# Patient Record
Sex: MALE | Race: WHITE | NOT HISPANIC OR LATINO | Employment: OTHER | URBAN - METROPOLITAN AREA
[De-identification: names, ages, dates, MRNs, and addresses within clinical notes are randomized per-mention and may not be internally consistent; named-entity substitution may affect disease eponyms.]

---

## 2023-05-09 NOTE — PROGRESS NOTES
Subjective   Kun Castle Jr is a 77 y.o. male who presents for patient is here for follow-up.  HPI  Kun is a 77-year-old male with known history of hypertension dyslipidemia vitamin D deficiency rheumatoid arthritis patient is here for follow-up, with major complaint denies chest pain shortness of breath fever chills nausea vomiting constipation diarrhea dysuria urgency frequency.  Patient is fasting for blood work.  Review of Systems  10 system review pertinent as above  Objective     Visit Vitals  /80   Pulse 78   Temp 36.9 °C (98.4 °F)   Resp 16      Physical Exam  HEENT: Atraumatic normocephalic the pupils are equal and round and reactive to light the sclerae nonicteric extraocular motion are intact.  Neck: Is supple without JVD no carotid bruits the trachea is midline there are no masses pulses are equal and bilateral with normal upstroke.  Skin: Normal.  Skin good texture.  Moist.  Good turgor.  No lesions, no rashes.  Lymph: No lymphadenopathy appreciated, no masses, no lesions  Lungs: Are clear to auscultation and percussion, good breath sounds bilaterally, no rhonchi, no wheezing, good diaphragmatic excursion.  Heart: Normal rate and normal rhythm S1, S2, no S3, no gallop, murmur or rub.  Abdomen: Soft, nontender, no organomegaly, good bowel sounds.    Extremities: Full range of motion, good pulses bilateral.  No cyanosis, no clubbing or edema.  Neuro: Cranial nerves II-XII are grossly intact there is no sensory or motor deficits.  Able to move all extremities.      Assessment/Plan     Here for follow-up fasting blood work    CBC BMP lipids AST ALT vitamin D 25-hydroxy      Continue with the low-fat, low-cholesterol diet,  I recommended Mediterranean diet, which include fish, chicken, vegetables and olive oil  Exercise daily for 30 minutes at least 3 times a week  Continue home medications    Hypertension  No added salt diet, do not and salt to your food  Try to exercise every other day for 30  minutes  Continue current medications    Rheumatoid arthritis  With deformed joint integrity  Occasional pain        Problem List Items Addressed This Visit          Circulatory    Hypertension - Primary       Digestive    Diverticulosis of large intestine without hemorrhage       Genitourinary    Prostate hypertrophy       Endocrine/Metabolic    Vitamin D deficiency       Other    Rheumatoid arthritis (CMS/HCC)    Hyperlipidemia         Hayden Bro MD

## 2023-05-11 ENCOUNTER — OFFICE VISIT (OUTPATIENT)
Dept: PRIMARY CARE | Facility: CLINIC | Age: 78
End: 2023-05-11
Payer: MEDICARE

## 2023-05-11 VITALS
TEMPERATURE: 98.4 F | BODY MASS INDEX: 37.69 KG/M2 | WEIGHT: 192 LBS | RESPIRATION RATE: 16 BRPM | HEART RATE: 78 BPM | SYSTOLIC BLOOD PRESSURE: 118 MMHG | HEIGHT: 60 IN | DIASTOLIC BLOOD PRESSURE: 80 MMHG

## 2023-05-11 DIAGNOSIS — M06.9 RHEUMATOID ARTHRITIS, INVOLVING UNSPECIFIED SITE, UNSPECIFIED WHETHER RHEUMATOID FACTOR PRESENT (MULTI): ICD-10-CM

## 2023-05-11 DIAGNOSIS — E55.9 VITAMIN D DEFICIENCY: ICD-10-CM

## 2023-05-11 DIAGNOSIS — K57.30 DIVERTICULOSIS OF LARGE INTESTINE WITHOUT HEMORRHAGE: ICD-10-CM

## 2023-05-11 DIAGNOSIS — N40.0 PROSTATE HYPERTROPHY: ICD-10-CM

## 2023-05-11 DIAGNOSIS — I10 HYPERTENSION, UNSPECIFIED TYPE: Primary | ICD-10-CM

## 2023-05-11 DIAGNOSIS — E78.2 MODERATE MIXED HYPERLIPIDEMIA NOT REQUIRING STATIN THERAPY: ICD-10-CM

## 2023-05-11 PROBLEM — E78.5 HYPERLIPIDEMIA: Status: ACTIVE | Noted: 2023-05-11

## 2023-05-11 PROCEDURE — 80061 LIPID PANEL: CPT | Performed by: INTERNAL MEDICINE

## 2023-05-11 PROCEDURE — 82306 VITAMIN D 25 HYDROXY: CPT | Performed by: INTERNAL MEDICINE

## 2023-05-11 PROCEDURE — 84450 TRANSFERASE (AST) (SGOT): CPT | Performed by: INTERNAL MEDICINE

## 2023-05-11 PROCEDURE — 1036F TOBACCO NON-USER: CPT | Performed by: INTERNAL MEDICINE

## 2023-05-11 PROCEDURE — 85025 COMPLETE CBC W/AUTO DIFF WBC: CPT | Performed by: INTERNAL MEDICINE

## 2023-05-11 PROCEDURE — 3079F DIAST BP 80-89 MM HG: CPT | Performed by: INTERNAL MEDICINE

## 2023-05-11 PROCEDURE — 84460 ALANINE AMINO (ALT) (SGPT): CPT | Performed by: INTERNAL MEDICINE

## 2023-05-11 PROCEDURE — 3074F SYST BP LT 130 MM HG: CPT | Performed by: INTERNAL MEDICINE

## 2023-05-11 PROCEDURE — 80048 BASIC METABOLIC PNL TOTAL CA: CPT | Performed by: INTERNAL MEDICINE

## 2023-05-11 PROCEDURE — 99214 OFFICE O/P EST MOD 30 MIN: CPT | Performed by: INTERNAL MEDICINE

## 2023-05-11 RX ORDER — LOSARTAN POTASSIUM 50 MG/1
1 TABLET ORAL DAILY
COMMUNITY
Start: 2018-04-12 | End: 2024-02-01

## 2023-05-11 RX ORDER — MULTIVITAMIN/IRON/FOLIC ACID 18MG-0.4MG
1 TABLET ORAL DAILY
COMMUNITY

## 2023-05-11 RX ORDER — CHOLECALCIFEROL (VITAMIN D3) 50 MCG
1 TABLET ORAL DAILY
COMMUNITY

## 2023-05-11 ASSESSMENT — PAIN SCALES - GENERAL: PAINLEVEL: 0-NO PAIN

## 2023-10-16 NOTE — PROGRESS NOTES
Subjective   Kun Castle Jr is a 78 y.o. male who presents for patient is here for follow-up.  HPI  Kun is a 77-year-old male with known history of hypertension dyslipidemia vitamin D deficiency rheumatoid arthritis patient is here for follow-up, with major complaint denies chest pain shortness of breath fever chills nausea vomiting constipation diarrhea dysuria urgency frequency.  Patient is fasting for blood work.  Review of Systems  10 system review pertinent as above  Objective     Visit Vitals  /74   Pulse 78   Temp 36.4 °C (97.5 °F)   Resp 15      Physical Exam  HEENT: Atraumatic normocephalic the pupils are equal and round and reactive to light the sclerae nonicteric extraocular motion are intact.  Neck: Is supple without JVD no carotid bruits the trachea is midline there are no masses pulses are equal and bilateral with normal upstroke.  Skin: Normal.  Skin good texture.  Moist.  Good turgor.  No lesions, no rashes.  Lymph: No lymphadenopathy appreciated, no masses, no lesions  Lungs: Are clear to auscultation and percussion, good breath sounds bilaterally, no rhonchi, no wheezing, good diaphragmatic excursion.  Heart: Normal rate and normal rhythm S1, S2, no S3, no gallop, murmur or rub.  Abdomen: Soft, nontender, no organomegaly, good bowel sounds.    Extremities: Full range of motion, good pulses bilateral.  No cyanosis, no clubbing or edema.  Neuro: Cranial nerves II-XII are grossly intact there is no sensory or motor deficits.  Able to move all extremities.      Assessment/Plan     Here for follow-up fasting blood work    CBC BMP lipids AST ALT vitamin D 25-hydroxy    Influenza HD 10/18/2023    Rt Knee pain osteoarthritis  Pain severe at time  Will refer to orthopedic surgery    Continue with the low-fat, low-cholesterol diet,  I recommended Mediterranean diet, which include fish, chicken, vegetables and olive oil  Exercise daily for 30 minutes at least 3 times a week  Continue home  medications    Hypertension  No added salt diet, do not and salt to your food  Try to exercise every other day for 30 minutes  Continue current medications    Rheumatoid arthritis  With deformed joint integrity  Occasional pain        Problem List Items Addressed This Visit       Rheumatoid arthritis (CMS/HCC)    Relevant Orders    CBC    Diverticulosis of large intestine without hemorrhage    Relevant Orders    CBC    Hyperlipidemia - Primary    Relevant Orders    Lipid Panel    AST    ALT    Hypertension    Relevant Orders    Basic Metabolic Panel    Prostate hypertrophy    Vitamin D deficiency     Other Visit Diagnoses       Primary osteoarthritis of right knee        Relevant Orders    Referral to Orthopaedic Surgery    XR knee right 1-2 views            Hayden Bro MD

## 2023-10-18 ENCOUNTER — OFFICE VISIT (OUTPATIENT)
Dept: PRIMARY CARE | Facility: CLINIC | Age: 78
End: 2023-10-18
Payer: MEDICARE

## 2023-10-18 VITALS
HEART RATE: 78 BPM | RESPIRATION RATE: 15 BRPM | DIASTOLIC BLOOD PRESSURE: 74 MMHG | TEMPERATURE: 97.5 F | SYSTOLIC BLOOD PRESSURE: 118 MMHG | HEIGHT: 68 IN | BODY MASS INDEX: 28.64 KG/M2 | WEIGHT: 189 LBS

## 2023-10-18 DIAGNOSIS — N40.0 PROSTATE HYPERTROPHY: ICD-10-CM

## 2023-10-18 DIAGNOSIS — M17.11 PRIMARY OSTEOARTHRITIS OF RIGHT KNEE: ICD-10-CM

## 2023-10-18 DIAGNOSIS — M06.9 RHEUMATOID ARTHRITIS, INVOLVING UNSPECIFIED SITE, UNSPECIFIED WHETHER RHEUMATOID FACTOR PRESENT (MULTI): ICD-10-CM

## 2023-10-18 DIAGNOSIS — E78.2 MODERATE MIXED HYPERLIPIDEMIA NOT REQUIRING STATIN THERAPY: Primary | ICD-10-CM

## 2023-10-18 DIAGNOSIS — K57.30 DIVERTICULOSIS OF LARGE INTESTINE WITHOUT HEMORRHAGE: ICD-10-CM

## 2023-10-18 DIAGNOSIS — E55.9 VITAMIN D DEFICIENCY: ICD-10-CM

## 2023-10-18 DIAGNOSIS — I10 PRIMARY HYPERTENSION: ICD-10-CM

## 2023-10-18 PROCEDURE — 99214 OFFICE O/P EST MOD 30 MIN: CPT | Performed by: INTERNAL MEDICINE

## 2023-10-18 PROCEDURE — 84450 TRANSFERASE (AST) (SGOT): CPT | Performed by: INTERNAL MEDICINE

## 2023-10-18 PROCEDURE — 1036F TOBACCO NON-USER: CPT | Performed by: INTERNAL MEDICINE

## 2023-10-18 PROCEDURE — 84460 ALANINE AMINO (ALT) (SGPT): CPT | Performed by: INTERNAL MEDICINE

## 2023-10-18 PROCEDURE — 85025 COMPLETE CBC W/AUTO DIFF WBC: CPT | Performed by: INTERNAL MEDICINE

## 2023-10-18 PROCEDURE — 3078F DIAST BP <80 MM HG: CPT | Performed by: INTERNAL MEDICINE

## 2023-10-18 PROCEDURE — 90662 IIV NO PRSV INCREASED AG IM: CPT | Performed by: INTERNAL MEDICINE

## 2023-10-18 PROCEDURE — 3074F SYST BP LT 130 MM HG: CPT | Performed by: INTERNAL MEDICINE

## 2023-10-18 PROCEDURE — 1125F AMNT PAIN NOTED PAIN PRSNT: CPT | Performed by: INTERNAL MEDICINE

## 2023-10-18 PROCEDURE — G0008 ADMIN INFLUENZA VIRUS VAC: HCPCS | Performed by: INTERNAL MEDICINE

## 2023-10-18 PROCEDURE — 80061 LIPID PANEL: CPT | Performed by: INTERNAL MEDICINE

## 2023-10-18 PROCEDURE — 1159F MED LIST DOCD IN RCRD: CPT | Performed by: INTERNAL MEDICINE

## 2023-10-18 PROCEDURE — 80048 BASIC METABOLIC PNL TOTAL CA: CPT | Performed by: INTERNAL MEDICINE

## 2023-10-18 ASSESSMENT — PAIN SCALES - GENERAL: PAINLEVEL: 4

## 2023-10-18 ASSESSMENT — ENCOUNTER SYMPTOMS
LOSS OF SENSATION IN FEET: 0
OCCASIONAL FEELINGS OF UNSTEADINESS: 0
DEPRESSION: 0

## 2023-10-25 ENCOUNTER — ANCILLARY PROCEDURE (OUTPATIENT)
Dept: RADIOLOGY | Facility: CLINIC | Age: 78
End: 2023-10-25
Payer: MEDICARE

## 2023-10-25 DIAGNOSIS — M17.11 PRIMARY OSTEOARTHRITIS OF RIGHT KNEE: ICD-10-CM

## 2023-10-25 PROCEDURE — 73562 X-RAY EXAM OF KNEE 3: CPT | Mod: RIGHT SIDE | Performed by: RADIOLOGY

## 2023-10-25 PROCEDURE — 73562 X-RAY EXAM OF KNEE 3: CPT | Mod: RT,FY

## 2023-11-04 PROBLEM — J45.909 ASTHMA DUE TO SEASONAL ALLERGIES (HHS-HCC): Status: ACTIVE | Noted: 2023-11-04

## 2023-11-04 PROBLEM — M66.232: Status: ACTIVE | Noted: 2023-11-04

## 2023-11-04 PROBLEM — M25.849 CYST OF JOINT OF HAND: Status: ACTIVE | Noted: 2023-11-04

## 2023-11-04 PROBLEM — M17.0 ARTHRITIS OF BOTH KNEES: Status: ACTIVE | Noted: 2023-11-04

## 2023-11-04 PROBLEM — M89.9 DISORDER OF BONE AND CARTILAGE: Status: ACTIVE | Noted: 2023-11-04

## 2023-11-04 PROBLEM — M65.931 EXTENSOR TENOSYNOVITIS OF RIGHT WRIST: Status: ACTIVE | Noted: 2023-11-04

## 2023-11-04 PROBLEM — M66.242: Status: ACTIVE | Noted: 2023-11-04

## 2023-11-04 PROBLEM — M19.90 ARTHRITIS: Status: ACTIVE | Noted: 2023-11-04

## 2023-11-04 PROBLEM — E29.1 HYPOGONADISM MALE: Status: ACTIVE | Noted: 2023-11-04

## 2023-11-04 PROBLEM — R06.02 SOB (SHORTNESS OF BREATH): Status: ACTIVE | Noted: 2023-11-04

## 2023-11-04 PROBLEM — M66.241: Status: ACTIVE | Noted: 2023-11-04

## 2023-11-04 PROBLEM — M65.831 EXTENSOR TENOSYNOVITIS OF RIGHT WRIST: Status: ACTIVE | Noted: 2023-11-04

## 2023-11-04 PROBLEM — M94.9 DISORDER OF BONE AND CARTILAGE: Status: ACTIVE | Noted: 2023-11-04

## 2023-11-04 PROBLEM — K40.90 RIGHT INGUINAL HERNIA: Status: ACTIVE | Noted: 2023-11-04

## 2023-11-04 RX ORDER — TRIAMCINOLONE ACETONIDE 1 MG/G
CREAM TOPICAL 2 TIMES DAILY
COMMUNITY
End: 2023-11-06

## 2023-11-06 ENCOUNTER — OFFICE VISIT (OUTPATIENT)
Dept: ORTHOPEDIC SURGERY | Facility: CLINIC | Age: 78
End: 2023-11-06
Payer: MEDICARE

## 2023-11-06 VITALS — BODY MASS INDEX: 29.19 KG/M2 | WEIGHT: 192 LBS

## 2023-11-06 DIAGNOSIS — M17.11 PRIMARY OSTEOARTHRITIS OF RIGHT KNEE: ICD-10-CM

## 2023-11-06 DIAGNOSIS — M17.11 ARTHRITIS OF RIGHT KNEE: Primary | ICD-10-CM

## 2023-11-06 PROCEDURE — 1125F AMNT PAIN NOTED PAIN PRSNT: CPT | Performed by: STUDENT IN AN ORGANIZED HEALTH CARE EDUCATION/TRAINING PROGRAM

## 2023-11-06 PROCEDURE — 1159F MED LIST DOCD IN RCRD: CPT | Performed by: STUDENT IN AN ORGANIZED HEALTH CARE EDUCATION/TRAINING PROGRAM

## 2023-11-06 PROCEDURE — 1036F TOBACCO NON-USER: CPT | Performed by: STUDENT IN AN ORGANIZED HEALTH CARE EDUCATION/TRAINING PROGRAM

## 2023-11-06 PROCEDURE — 99214 OFFICE O/P EST MOD 30 MIN: CPT | Performed by: STUDENT IN AN ORGANIZED HEALTH CARE EDUCATION/TRAINING PROGRAM

## 2023-11-06 PROCEDURE — 3074F SYST BP LT 130 MM HG: CPT | Performed by: STUDENT IN AN ORGANIZED HEALTH CARE EDUCATION/TRAINING PROGRAM

## 2023-11-06 PROCEDURE — 3078F DIAST BP <80 MM HG: CPT | Performed by: STUDENT IN AN ORGANIZED HEALTH CARE EDUCATION/TRAINING PROGRAM

## 2023-11-06 ASSESSMENT — PAIN - FUNCTIONAL ASSESSMENT: PAIN_FUNCTIONAL_ASSESSMENT: NO/DENIES PAIN

## 2023-11-06 NOTE — PROGRESS NOTES
Katie Ornelas MD   [facility]  Phone: [phone]     Fax: [phone]     INITIAL CONSULTATION    Name: Kun Castle Jr  : 1945  Date of Visit:  2023    CC: Right knee pain    Clinical History:  Kun Castle Jr is a 78 y.o. male who presents with  15  years of RIGHT knee pain. Referred by Dr. Bro.     Patient has tried the following Ice, NSAIDs, Activity modification, Corticosteroid injections , Hyaluronic acid injections, and Xray. Patient does have pain at night. Patient is able to walk unlimited blocks. Patient is currently using nothing as assistive device. Primarily complains of lateral  pain. Patient has difficulty with climbing stairs, descending stairs, walking , and walking on unlevel surfaces . The pain is significantly impacting his ability to perform activities of daily living. Patient reports no longer able to do activities such as walk without pain, golfing with ease.    Date of last steroid injection: >3 months     Prior hip/knee replacement: No    PROMs   No questionnaires on file.    Past Medical History:   Diagnosis Date    Acute maxillary sinusitis, unspecified 2016    Acute maxillary sinusitis, unspecified    Acute maxillary sinusitis, unspecified 2016    Acute non-recurrent maxillary sinusitis    Asymptomatic varicose veins of bilateral lower extremities 2015    Varicose veins of legs    Cough, unspecified 2014    Cough    Hemorrhage of anus and rectum 10/06/2016    Bright red rectal bleeding    Nocturia 2016    Nocturia    Orthostatic hypotension 2015    Sympathotonic orthostatic hypotension    Pain in right knee 2015    Recurrent knee pain, right    Personal history of diseases of the skin and subcutaneous tissue 2015    History of rhinophyma    Personal history of other diseases of the digestive system     History of gastroesophageal reflux (GERD)    Personal history of  other diseases of the nervous system and sense organs 10/29/2015    History of sciatica    Personal history of other diseases of the respiratory system 04/06/2016    History of acute sinusitis    Sciatica, unspecified side 12/02/2015    Acute sciatica    Vitamin D deficiency, unspecified 04/05/2016    Vitamin D deficiency     The patient denies a history of deep vein thrombosis, pulmonary embolism or problems with anesthesia in the past.    Past Surgical History:   Procedure Laterality Date    COLONOSCOPY  05/14/2014    Colonoscopy (Fiberoptic)       Allergies: He has No Known Allergies.     Medications:  Current Outpatient Medications   Medication Instructions    b complex (B Complex 1, with folic acid,) 0.4 mg tablet oral    cholecalciferol (Vitamin D-3) 50 MCG (2000 UT) tablet 1 tablet, oral, Daily    losartan (Cozaar) 50 mg tablet 1 tablet, oral, Daily       No family history on file.  Documented in chart and reviewed. No pertinent family history. No family history of any bleeding or clotting disorders.    Social History     Tobacco Use    Smoking status: Never     Passive exposure: Never    Smokeless tobacco: Never   Substance Use Topics    Alcohol use: Never       Review of Systems: Review of systems completed with medical assistant intake. Please refer to this note.     Falls: The patient denies any recent falls or fall-related injuries.    Physical Exam:  BMI: Patient's BMI is 29. Encouraged to lose weight and to follow up with PCP.    Constitutional: The patient is well appearing and well groomed.     Neurological/Psychiatric: The patient is alert and oriented to person, place and time. The patient has a normal mood and affect.    Skin Examination: The skin over the right lower extremity, left lower extremity, right upper extremity, and left upper extremity is intact without any evidence of infection or rash.    Cardiovascular Examination: There are no varicosities and the skin is normal temperature,  capillary refill normal, arterial pulses normal, no edema.    Lymphatic Examination: There is no lymphatic swelling or palpable lymph nodes present.    Neurological Examination: Bilateral lower extremities are grossly neurologically intact. Sensation normal, motor function normal, coordination / cerebellum normal, reflexes normal    Gait: The patient ambulates with an antalgic gait.     Right Hip Examination:  The skin is intact over the hip.    There is no tenderness over the greater trochanter.    Range of motion is full extension to 100 degrees of flexion.    The hip is stable without subluxation or dislocation.    The hip internally rotates to 15 degrees and externally rotates to 45 degrees.    There is no pain with hip motion.    Left Hip Examination:  The skin is intact over the hip.    There is no tenderness over the greater trochanter.    Range of motion is full extension to 100 degrees of flexion.    The hip is stable without subluxation or dislocation.    The hip internally rotates to 15 degrees and externally rotates to 45 degrees.    There is no pain with hip motion.    Right Knee Examination:  Examination of the knee reveals the skin to be intact.    There is a moderate effusion in the knee.    The alignment of the knee is Varus.    This deformity is not correctable.    There is tenderness to palpation over the joint line.    There is significant quadriceps atrophy.    Range of Motion: 5 to 120 degrees of flexion.    The knee is stable to varus-valgus stress and anterior-posterior stress.     There is severe grinding with range of motion.    There is severe patellofemoral crepitus.    Left Knee Examination:  Examination of the knee reveals the skin to be intact.     There is no obvious swelling.    There is a no effusion in the knee.     The alignment of the knee is normal.    There is no tenderness to palpation over the joint line.    There is no significant quadriceps atrophy.    Range of motion is  full extension to 120 degrees of flexion.    The knee is stable to varus-valgus stress and anterior-posterior stress.     There is severe grinding with range of motion.    There is severe patellofemoral crepitus.    Radiographs:  Radiographs were personally reviewed today. There is evidence of severe RIGHT  knee osteoarthritis with MEDIAL bone on bone apposition.    Impression:  78 y.o. male presents with severe RIGHT  knee osteoarthritis with bone on bone apposition. Patient has tried and failed appropriate conservative measures and now has limitation in ADL's.     Diagnosis:  Arthritis of right knee    Primary osteoarthritis of right knee    Recommendations / Plan:    I have discussed the options in detail with the patient. We have discussed anti-inflammatory medication, activity modification, physical therapy, corticosteroid injections, viscosupplementation injections, and total knee replacement surgery.    Spent significant time reviewing the clinical findings and imaging results with the patient and discussed all treatment options available to him at this point.  He would like to try another corticosteroid injection as this has not for him in the past.  Recommend that he continues his anti-inflammatory medications both topical and oral.  Physical therapy was offered to him but he is staying quite active with his daily walks and home exercise program.  He would like to see his response to the steroid injections.  These can be repeated up to every 3 months.  Discussed that he would need to delay his surgery by 3 months if he did decide to proceed with surgical intervention.  He does have bone-on-bone arthritis of his right knee and this is starting to affect his activities.  The timing for surgery is up to him.  He would be a candidate for right total knee replacement when he feels the timing is right.  Steroid injection given today with procedure note as below.    We have reviewed the typical recovery after  surgery and have discussed the fact that full recovery can take up to 1 year or even longer.     Large Joint Injection [id number]: Knee   Consent given by:?Patient   Timeout:?Immediately prior to procedure the correct patient, procedure, and site was verified.?Sterile gloves and semi-sterile technique were used.    Indications:?Knee pain and joint swelling.?   Location:?RIGHT knee   Needle size:?22 G   Approach:?Lateral      Medications administered:? please see medical assistant note for Lot number and expiration  Subcutaneous    4 ml of 1% lidocaine      Deep    4 ml of 1% lidocaine   4 mL 0.5% marcaine   2 mL of 40 mg kenalog      Patient tolerance:?Dressing applied. Patient tolerated the procedure well with no immediate complications.      _____________  Katie Ornelas MD   [facility]     Approximately 45 minutes were spent on the following tasks:              Preparing for the patient              Reviewing medical records              Taking a patient history              Performing a physical exam              Reviewing treatment options with the patient              Explaining the risks, potential benefits, and alternative to surgery  Explaining the expected rehabilitation after each treatment option  Explaining the potential long term expectations  Evaluating the diagnostic imaging     This office note was transcribed with dictation software.  Please excuse any typographical errors, program misunderstandings leading to inadvertent insertions or deletions of inappropriate wording, pronoun errors and other unintentional transcription errors not noticed on proof-reading.

## 2023-11-10 DIAGNOSIS — G89.29 CHRONIC PAIN OF LEFT KNEE: Primary | ICD-10-CM

## 2023-11-10 DIAGNOSIS — M25.562 CHRONIC PAIN OF LEFT KNEE: Primary | ICD-10-CM

## 2023-11-13 ENCOUNTER — OFFICE VISIT (OUTPATIENT)
Dept: ORTHOPEDIC SURGERY | Facility: CLINIC | Age: 78
End: 2023-11-13
Payer: MEDICARE

## 2023-11-13 ENCOUNTER — ANCILLARY PROCEDURE (OUTPATIENT)
Dept: RADIOLOGY | Facility: CLINIC | Age: 78
End: 2023-11-13
Payer: MEDICARE

## 2023-11-13 DIAGNOSIS — G89.29 CHRONIC PAIN OF LEFT KNEE: ICD-10-CM

## 2023-11-13 DIAGNOSIS — M17.12 ARTHRITIS OF LEFT KNEE: Primary | ICD-10-CM

## 2023-11-13 DIAGNOSIS — M25.562 CHRONIC PAIN OF LEFT KNEE: ICD-10-CM

## 2023-11-13 PROCEDURE — 3078F DIAST BP <80 MM HG: CPT | Performed by: STUDENT IN AN ORGANIZED HEALTH CARE EDUCATION/TRAINING PROGRAM

## 2023-11-13 PROCEDURE — 1159F MED LIST DOCD IN RCRD: CPT | Performed by: STUDENT IN AN ORGANIZED HEALTH CARE EDUCATION/TRAINING PROGRAM

## 2023-11-13 PROCEDURE — 73562 X-RAY EXAM OF KNEE 3: CPT | Mod: LT

## 2023-11-13 PROCEDURE — 1125F AMNT PAIN NOTED PAIN PRSNT: CPT | Performed by: STUDENT IN AN ORGANIZED HEALTH CARE EDUCATION/TRAINING PROGRAM

## 2023-11-13 PROCEDURE — 73562 X-RAY EXAM OF KNEE 3: CPT | Mod: LEFT SIDE | Performed by: RADIOLOGY

## 2023-11-13 PROCEDURE — 99214 OFFICE O/P EST MOD 30 MIN: CPT | Performed by: STUDENT IN AN ORGANIZED HEALTH CARE EDUCATION/TRAINING PROGRAM

## 2023-11-13 PROCEDURE — 1036F TOBACCO NON-USER: CPT | Performed by: STUDENT IN AN ORGANIZED HEALTH CARE EDUCATION/TRAINING PROGRAM

## 2023-11-13 PROCEDURE — 3074F SYST BP LT 130 MM HG: CPT | Performed by: STUDENT IN AN ORGANIZED HEALTH CARE EDUCATION/TRAINING PROGRAM

## 2023-11-13 NOTE — PROGRESS NOTES
Katie Ornelas MD   [facility]  Phone: [phone]     Fax: [phone]     Follow-up Knee    Name: Kun Castle Jr  : 1945  Date of Visit:  2023    CC: Follow-up BILATERAL knee     History of Present Illness:  The patient presents for follow-up evaluation of bilateral knee pain. Previously seen on  for right knee OA and was given a corticosteroid injection which patient reports has helped substantially. Endorses only mild pain on the right. Given his response, he would like to proceed with a CSI on the left. Denies history of DM or reaction to prior injection. Continues to ambulate independently.     Prior hip/knee replacement: No    Medications:  Current Outpatient Medications   Medication Instructions    b complex (B Complex 1, with folic acid,) 0.4 mg tablet oral    cholecalciferol (Vitamin D-3) 50 MCG (2000 UT) tablet 1 tablet, oral, Daily    losartan (Cozaar) 50 mg tablet 1 tablet, oral, Daily       Allergies:  Allergies as of 2023    (No Known Allergies)       Past Medical History:    Past Medical History:   Diagnosis Date    Acute maxillary sinusitis, unspecified 2016    Acute maxillary sinusitis, unspecified    Acute maxillary sinusitis, unspecified 2016    Acute non-recurrent maxillary sinusitis    Asymptomatic varicose veins of bilateral lower extremities 2015    Varicose veins of legs    Cough, unspecified 2014    Cough    Hemorrhage of anus and rectum 10/06/2016    Bright red rectal bleeding    Nocturia 2016    Nocturia    Orthostatic hypotension 2015    Sympathotonic orthostatic hypotension    Pain in right knee 2015    Recurrent knee pain, right    Personal history of diseases of the skin and subcutaneous tissue 2015    History of rhinophyma    Personal history of other diseases of the digestive system     History of gastroesophageal reflux (GERD)    Personal history of other  diseases of the nervous system and sense organs 10/29/2015    History of sciatica    Personal history of other diseases of the respiratory system 04/06/2016    History of acute sinusitis    Sciatica, unspecified side 12/02/2015    Acute sciatica    Vitamin D deficiency, unspecified 04/05/2016    Vitamin D deficiency       Past Surgical History:   Past Surgical History:   Procedure Laterality Date    COLONOSCOPY  05/14/2014    Colonoscopy (Fiberoptic)       Social History:   Social History     Socioeconomic History    Marital status:      Spouse name: Not on file    Number of children: Not on file    Years of education: Not on file    Highest education level: Not on file   Occupational History    Not on file   Tobacco Use    Smoking status: Never     Passive exposure: Never    Smokeless tobacco: Never   Substance and Sexual Activity    Alcohol use: Never    Drug use: Never    Sexual activity: Not on file   Other Topics Concern    Not on file   Social History Narrative    Not on file     Social Determinants of Health     Financial Resource Strain: Not on file   Food Insecurity: Not on file   Transportation Needs: Not on file   Physical Activity: Not on file   Stress: Not on file   Social Connections: Not on file   Intimate Partner Violence: Not on file   Housing Stability: Not on file       Family History: No family history on file.    Review of Systems: Review of systems completed with medical assistant intake. Please refer to this note.     Physical Exam:  BMI: 29 which is relatively normal. Encouraged to follow up with PCP.    General: The patient is well appearing, alert and oriented to time, place and person and has an appropriate affect.     Neurological Examination: Sensation normal, motor function normal, coordination / cerebellum normal, reflexes normal.    Cardiovascular Exam: Capillary refill normal, arterial pulses normal, no varicose veins, no edema.    Skin Exam: Skin throughout the upper and  lower extremities is normal without any evidence of infection or rash.    Gait: deferred    Right Knee Examination:  The skin is intact. No prior incisions.    There is no significant effusion in the knee without a Baker's Cyst    The alignment of the knee is varus.  This is not correctable.    There is no tenderness to palpation over the joint line.    There is significant quadriceps atrophy.    Range of motion is 5 to 110.     There is no medial-lateral instability or anterior-posterior instability.     There is no patellofemoral crepitus.    Left Knee Examination:  The skin is intact. Prior incisions: none.    There is no significant effusion in the knee without a Baker's Cyst    The alignment of the knee is varus.  This deformity is not correctable.    There is  tenderness to palpation over the joint line.    There is significant quadriceps atrophy.    Range of motion is 5 to 110.     There is no medial-lateral instability or anterior-posterior instability.     There is no patellofemoral crepitus.    Imaging:  X-rays were personally reviewed today and show severe tricompartmental osteoarthritis bilaterally with medial bone-on-bone apposition.    Impression and Plan:  78 y.o. male here for follow-up evaluation of BILATERAL knee severe osteoarthritis, which is currently being conservatively managed. He is a candidate for CSI on the left today.    I have again discussed the treatment options in detail with the patient for bilateral knee arthritis. We have discussed anti-inflammatory medication, activity modification, physical therapy, corticosteroid injections, viscosupplementation injections, and total knee replacement surgery.    He is satisfied with continued conservative management of his end-stage knee arthritis.    RTC: 3 months as needed if desires repeat injections    X-rays at next visit: None    Large Joint Injection [id number]: Knee  Consent given by: Patient  Timeout: Immediately prior to procedure the  correct patient, procedure, and site was verified. Sterile gloves and semi-sterile technique were used.   Indications: Knee pain and joint swelling.   Location: LEFT knee  Needle size: 22 G  Approach: Lateral    Medications administered: please see medial assistant note for Lot number and expiration  Subcutaneous   4 ml of 1% lidocaine    Deep   4 ml of 1% lidocaine  4 mL 0.5% marcaine  2 mL of 40 mg kenalog    Patient tolerance: Dressing applied. Patient tolerated the procedure well with no immediate complications.    _____________________  Katie Ornelas MD   [facility]

## 2023-12-07 NOTE — PROGRESS NOTES
Subjective   Kun Castle Jr is a 78 y.o. male who presents for patient is here for follow-up and medicare wellness.  HPI  Kun is a 77-year-old male with known history of hypertension dyslipidemia vitamin D deficiency rheumatoid arthritis patient is here for follow-up, with major complaint denies chest pain shortness of breath fever chills nausea vomiting constipation diarrhea dysuria urgency frequency. Here to reviewer.    Review of Systems  10 system review pertinent as above  Objective     Visit Vitals  /80   Pulse 78   Temp 36.6 °C (97.9 °F)   Resp 16      Physical Exam  HEENT: Atraumatic normocephalic the pupils are equal and round and reactive to light the sclerae nonicteric extraocular motion are intact.  Neck: Is supple without JVD no carotid bruits the trachea is midline there are no masses pulses are equal and bilateral with normal upstroke.  Skin: Normal.  Skin good texture.  Moist.  Good turgor.  No lesions, no rashes.  Lymph: No lymphadenopathy appreciated, no masses, no lesions  Lungs: Are clear to auscultation and percussion, good breath sounds bilaterally, no rhonchi, no wheezing, good diaphragmatic excursion.  Heart: Normal rate and normal rhythm S1, S2, no S3, no gallop, murmur or rub.  Abdomen: Soft, nontender, no organomegaly, good bowel sounds.    Extremities: Full range of motion, good pulses bilateral.  No cyanosis, no clubbing or edema.  Neuro: Cranial nerves II-XII are grossly intact there is no sensory or motor deficits.  Able to move all extremities.      Assessment/Plan     Reviewed all blood work with patient   All questions answered to satisfaction.     Influenza HD 10/18/2023    Rt Knee pain osteoarthritis  Pain severe at time  Will refer to orthopedic surgery    Continue with the low-fat, low-cholesterol diet,  I recommended Mediterranean diet, which include fish, chicken, vegetables and olive oil  Exercise daily for 30 minutes at least 3 times a week  Continue home  medications    Hypertension  No added salt diet, do not and salt to your food  Try to exercise every other day for 30 minutes  Continue current medications    Rheumatoid arthritis  With deformed joint integrity  Occasional pain        Problem List Items Addressed This Visit    None    Hayden Bro MD

## 2023-12-12 ENCOUNTER — OFFICE VISIT (OUTPATIENT)
Dept: PRIMARY CARE | Facility: CLINIC | Age: 78
End: 2023-12-12
Payer: MEDICARE

## 2023-12-12 VITALS
HEIGHT: 68 IN | WEIGHT: 191 LBS | DIASTOLIC BLOOD PRESSURE: 80 MMHG | TEMPERATURE: 97.9 F | BODY MASS INDEX: 28.95 KG/M2 | SYSTOLIC BLOOD PRESSURE: 122 MMHG | RESPIRATION RATE: 16 BRPM | HEART RATE: 78 BPM

## 2023-12-12 DIAGNOSIS — E78.2 MODERATE MIXED HYPERLIPIDEMIA NOT REQUIRING STATIN THERAPY: Primary | ICD-10-CM

## 2023-12-12 DIAGNOSIS — M05.742 RHEUMATOID ARTHRITIS INVOLVING BOTH HANDS WITH POSITIVE RHEUMATOID FACTOR (MULTI): ICD-10-CM

## 2023-12-12 DIAGNOSIS — I10 PRIMARY HYPERTENSION: ICD-10-CM

## 2023-12-12 DIAGNOSIS — M05.741 RHEUMATOID ARTHRITIS INVOLVING BOTH HANDS WITH POSITIVE RHEUMATOID FACTOR (MULTI): ICD-10-CM

## 2023-12-12 PROCEDURE — 3079F DIAST BP 80-89 MM HG: CPT | Performed by: INTERNAL MEDICINE

## 2023-12-12 PROCEDURE — G0439 PPPS, SUBSEQ VISIT: HCPCS | Performed by: INTERNAL MEDICINE

## 2023-12-12 PROCEDURE — 99213 OFFICE O/P EST LOW 20 MIN: CPT | Performed by: INTERNAL MEDICINE

## 2023-12-12 PROCEDURE — 3074F SYST BP LT 130 MM HG: CPT | Performed by: INTERNAL MEDICINE

## 2023-12-12 PROCEDURE — 1126F AMNT PAIN NOTED NONE PRSNT: CPT | Performed by: INTERNAL MEDICINE

## 2023-12-12 PROCEDURE — 1170F FXNL STATUS ASSESSED: CPT | Performed by: INTERNAL MEDICINE

## 2023-12-12 PROCEDURE — 1036F TOBACCO NON-USER: CPT | Performed by: INTERNAL MEDICINE

## 2023-12-12 PROCEDURE — 1159F MED LIST DOCD IN RCRD: CPT | Performed by: INTERNAL MEDICINE

## 2023-12-12 ASSESSMENT — ACTIVITIES OF DAILY LIVING (ADL)
GROCERY SHOPPING: INDEPENDENT
EATING: INDEPENDENT
MANAGING FINANCES: INDEPENDENT
DOING HOUSEWORK: INDEPENDENT
STIL DRIVING: YES
GROCERY SHOPPING: INDEPENDENT
NEEDS ASSISTANCE WITH FOOD: INDEPENDENT
TOILETING: INDEPENDENT
TOILETING: INDEPENDENT
USING TELEPHONE: INDEPENDENT
TAKING MEDICATION: INDEPENDENT
GROOMING: INDEPENDENT
USING TRANSPORTATION: INDEPENDENT
FEEDING: INDEPENDENT
HEARING - RIGHT EAR: FUNCTIONAL
USING TRANSPORTATION: INDEPENDENT
NEEDS ASSISTANCE WITH FOOD: INDEPENDENT
PILL BOX USED: NO
DRESSING: INDEPENDENT
FEEDING YOURSELF: INDEPENDENT
TOILETING: INDEPENDENT
BATHING: INDEPENDENT
WALKS IN HOME: INDEPENDENT
USING TELEPHONE: INDEPENDENT
PILL BOX USED: NO
BATHING: INDEPENDENT
JUDGMENT_ADEQUATE_SAFELY_COMPLETE_DAILY_ACTIVITIES: YES
ADEQUATE_TO_COMPLETE_ADL: YES
MANAGING FINANCES: INDEPENDENT
BATHING: INDEPENDENT
DRESSING: INDEPENDENT
STIL DRIVING: YES
FEEDING: INDEPENDENT
ADEQUATE_TO_COMPLETE_ADL: YES
TAKING MEDICATION: INDEPENDENT
PREPARING MEALS: INDEPENDENT
DOING HOUSEWORK: INDEPENDENT
EATING: INDEPENDENT
PREPARING MEALS: INDEPENDENT
JUDGMENT_ADEQUATE_SAFELY_COMPLETE_DAILY_ACTIVITIES: YES
ADEQUATE_TO_COMPLETE_ADL: YES
DRESSING YOURSELF: INDEPENDENT
HEARING - LEFT EAR: FUNCTIONAL
JUDGMENT_ADEQUATE_SAFELY_COMPLETE_DAILY_ACTIVITIES: YES
PATIENT'S MEMORY ADEQUATE TO SAFELY COMPLETE DAILY ACTIVITIES?: YES

## 2023-12-12 ASSESSMENT — ANXIETY QUESTIONNAIRES
4. TROUBLE RELAXING: NOT AT ALL
5. BEING SO RESTLESS THAT IT IS HARD TO SIT STILL: NOT AT ALL
7. FEELING AFRAID AS IF SOMETHING AWFUL MIGHT HAPPEN: NOT AT ALL
GAD7 TOTAL SCORE: 0
IF YOU CHECKED OFF ANY PROBLEMS ON THIS QUESTIONNAIRE, HOW DIFFICULT HAVE THESE PROBLEMS MADE IT FOR YOU TO DO YOUR WORK, TAKE CARE OF THINGS AT HOME, OR GET ALONG WITH OTHER PEOPLE: NOT DIFFICULT AT ALL
6. BECOMING EASILY ANNOYED OR IRRITABLE: NOT AT ALL
2. NOT BEING ABLE TO STOP OR CONTROL WORRYING: NOT AT ALL
3. WORRYING TOO MUCH ABOUT DIFFERENT THINGS: NOT AT ALL
1. FEELING NERVOUS, ANXIOUS, OR ON EDGE: NOT AT ALL

## 2023-12-12 ASSESSMENT — GERIATRIC MINI NUTRITIONAL ASSESSMENT (MNA)
A HAS FOOD INTAKE DECLINED OVER THE PAST 3 MONTHS DUE TO LOSS OF APPETITE, DIGESTIVE PROBLEMS, CHEWING OR SWALLOWING DIFFICULTIES?: NO DECREASE IN FOOD INTAKE
D HAS SUFFERED PSYCHOLOGICAL STRESS OR ACUTE DISEASE IN THE PAST 3 MONTHS?: NO
C GENERAL MOBILITY: GOES OUT
E NEUROPSYCHOLOGICAL PROBLEMS: NO PSYCHOLOGICAL PROBLEMS
B WEIGHT LOSS DURING THE LAST 3 MONTHS: NO WEIGHT LOSS
A HAS FOOD INTAKE DECLINED OVER THE PAST 3 MONTHS DUE TO LOSS OF APPETITE, DIGESTIVE PROBLEMS, CHEWING OR SWALLOWING DIFFICULTIES?: NO DECREASE IN FOOD INTAKE
C GENERAL MOBILITY: GOES OUT
D HAS SUFFERED PSYCHOLOGICAL STRESS OR ACUTE DISEASE IN THE PAST 3 MONTHS?: NO
B WEIGHT LOSS DURING THE LAST 3 MONTHS: NO WEIGHT LOSS

## 2023-12-12 ASSESSMENT — PATIENT HEALTH QUESTIONNAIRE - PHQ9
SUM OF ALL RESPONSES TO PHQ9 QUESTIONS 1 AND 2: 0
2. FEELING DOWN, DEPRESSED OR HOPELESS: NOT AT ALL
1. LITTLE INTEREST OR PLEASURE IN DOING THINGS: NOT AT ALL

## 2023-12-12 ASSESSMENT — PAIN SCALES - GENERAL: PAINLEVEL: 0-NO PAIN

## 2023-12-12 ASSESSMENT — ENCOUNTER SYMPTOMS
OCCASIONAL FEELINGS OF UNSTEADINESS: 0
DEPRESSION: 0
LOSS OF SENSATION IN FEET: 0

## 2023-12-12 ASSESSMENT — COLUMBIA-SUICIDE SEVERITY RATING SCALE - C-SSRS
2. HAVE YOU ACTUALLY HAD ANY THOUGHTS OF KILLING YOURSELF?: NO
1. IN THE PAST MONTH, HAVE YOU WISHED YOU WERE DEAD OR WISHED YOU COULD GO TO SLEEP AND NOT WAKE UP?: NO
6. HAVE YOU EVER DONE ANYTHING, STARTED TO DO ANYTHING, OR PREPARED TO DO ANYTHING TO END YOUR LIFE?: NO

## 2023-12-12 NOTE — PROGRESS NOTES
"Subjective   Reason for Visit: Kun Castle Jr is an 78 y.o. male here for a Medicare Wellness visit.       HPI    Patient Care Team:  Hayden Bro MD as PCP - General  Hayden Bro MD as PCP [place] [facility]     Kun is 78, history of rheumatoid arthritis, is relatively healthy, denies fever chills cough denies falling takes his medication as prescribed lives independently at home with his wife patient is self-sufficient.    Review of Systems  10 system review pertinent as above  Objective   Vitals:  /80   Pulse 78   Temp 36.6 °C (97.9 °F)   Resp 16   Ht 1.727 m (5' 8\")   Wt 86.6 kg (191 lb)   BMI 29.04 kg/m²       Physical Exam  HEENT: Atraumatic normocephalic the pupils are equal and round and reactive to light the sclerae nonicteric extraocular motion are intact.  Neck: Is supple without JVD no carotid bruits the trachea is midline there are no masses pulses are equal and bilateral with normal upstroke.  Skin: Normal.  Skin good texture.  Moist.  Good turgor.  No lesions, no rashes.  Lymph: No lymphadenopathy appreciated, no masses, no lesions  Lungs: Are clear to auscultation and percussion, good breath sounds bilaterally, no rhonchi, no wheezing, good diaphragmatic excursion.  Heart: Normal rate and normal rhythm S1, S2, no S3, no gallop, murmur or rub.  Abdomen: Soft, nontender, no organomegaly, good bowel sounds.    Extremities: Full range of motion, good pulses bilateral.  No cyanosis, no clubbing or edema.  Neuro: Cranial nerves II-XII are grossly intact there is no sensory or motor deficits.  Able to move all extremities.  Assessment/Plan   Medicare wellness  Problem List Items Addressed This Visit       Rheumatoid arthritis (CMS/[facility])    Hyperlipidemia - Primary    Hypertension          "

## 2024-01-31 DIAGNOSIS — I10 PRIMARY HYPERTENSION: Primary | ICD-10-CM

## 2024-02-01 RX ORDER — LOSARTAN POTASSIUM 50 MG/1
50 TABLET ORAL DAILY
Qty: 90 TABLET | Refills: 3 | Status: SHIPPED | OUTPATIENT
Start: 2024-02-01

## 2024-02-12 NOTE — PROGRESS NOTES
Katie Ornelas MD    Adult Reconstruction and Joint Replacement Surgery   Phone: [phone]     Fax: [phone]       Follow-up Knee      Name: Kun Castle Jr   : 1945   Date of Visit:  2024      CC: Follow-up RIGHT knee       History of Present Illness:   The patient presents for follow-up evaluation of right knee. They were last seen for this problem on 23. He received a steroid injection to his knee at that time. He reports the injection lasted for 3 weeks. He has not been participating in physical therapy. He has been doing home physical therapy.  His knee pain has been dramatically worsening. He wonders if it would be time to get his knee replaced.      Reports old injury on right knee. Left hurts more than right knee. Right knee is more stiff. Aleve is helping.      Medications:   Current Outpatient Medications   Medication Instructions    b complex (B Complex 1, with folic acid,) 0.4 mg tablet oral    cholecalciferol (Vitamin D-3) 50 MCG ( UT) tablet 1 tablet, oral, Daily    losartan (COZAAR) 50 mg, oral, Daily, as directed         Allergies:   Allergies as of 2024    (No Known Allergies)         Past Medical History:    Past Medical History:   Diagnosis Date    Acute maxillary sinusitis, unspecified 2016    Acute maxillary sinusitis, unspecified    Acute maxillary sinusitis, unspecified 2016    Acute non-recurrent maxillary sinusitis    Asymptomatic varicose veins of bilateral lower extremities 2015    Varicose veins of legs    Cough, unspecified 2014    Cough    Hemorrhage of anus and rectum 10/06/2016    Bright red rectal bleeding    Nocturia 2016    Nocturia    Orthostatic hypotension 2015    Sympathotonic orthostatic hypotension    Pain in right knee 2015    Recurrent knee pain, right    Personal history of diseases of the skin and subcutaneous tissue 2015    History of rhinophyma    Personal history of other  diseases of the digestive system     History of gastroesophageal reflux (GERD)    Personal history of other diseases of the nervous system and sense organs 10/29/2015    History of sciatica    Personal history of other diseases of the respiratory system 04/06/2016    History of acute sinusitis    Sciatica, unspecified side 12/02/2015    Acute sciatica    Vitamin D deficiency, unspecified 04/05/2016    Vitamin D deficiency         Past Surgical History:   Past Surgical History:   Procedure Laterality Date    COLONOSCOPY  05/14/2014    Colonoscopy (Fiberoptic)         Social History:   Social History     Socioeconomic History    Marital status:      Spouse name: Not on file    Number of children: Not on file    Years of education: Not on file    Highest education level: Not on file   Occupational History    Not on file   Tobacco Use    Smoking status: Never     Passive exposure: Never    Smokeless tobacco: Never   Substance and Sexual Activity    Alcohol use: Never    Drug use: Never    Sexual activity: Not on file   Other Topics Concern    Not on file   Social History Narrative    Not on file     Social Determinants of Health     Financial Resource Strain: Not on file   Food Insecurity: Not on file   Transportation Needs: Not on file   Physical Activity: Not on file   Stress: Not on file   Social Connections: Not on file   Intimate Partner Violence: Not on file   Housing Stability: Not on file         Family History: No family history on file.      Review of Systems: Review of systems completed with medical assistant intake. Please refer to this note.       Physical Exam:   BMI: Body mass index is 30.54 kg/m²., which is abnormal. Encouraged to lose weight and to follow up with PCP.      General: The patient is well-appearing, alert and oriented to time, place and person and has an appropriate affect.       Neurological Examination: Sensation normal, motor function normal, coordination / cerebellum normal,  reflexes normal.      Cardiovascular Exam: Capillary refill normal, arterial pulses normal, no varicose veins, no edema.      Skin Exam: Skin throughout the upper and lower extremities is normal without any evidence of infection or rash.      Gait: patient ambulates with an antalgic gait.      Right Hip Examination:   The skin is intact over the hip.      There is no tenderness over the greater trochanter.      Range of motion is full extension to 100 degrees of flexion.      The hip is stable without subluxation or dislocation.      The hip internally rotates to 15 degrees and externally rotates to 45 degrees.      There is no pain with hip motion.      Left Hip Examination:   The skin is intact over the hip.      There is no tenderness over the greater trochanter.      Range of motion is full extension to 100 degrees of flexion.      The hip is stable without subluxation or dislocation.      The hip internally rotates to 15 degrees and externally rotates to 45 degrees.      There is no pain with hip motion.      Left Knee Examination:   Examination of the left knee reveals the skin to be intact.      There is a moderate effusion in the knee.      The alignment of the knee is Varus.      This deformity is not correctable.      There is tenderness to palpation over the joint line.      There is significant quadriceps atrophy.      Range of Motion: 5 to 120 degrees of flexion.      The knee is stable to varus-valgus stress and anterior-posterior stress.       There is moderate grinding with range of motion.      There is severe patellofemoral crepitus.      Right Knee Examination:   Examination of the left knee reveals the skin to be intact.      There is a moderate effusion in the knee.      The alignment of the knee is neutral.      This deformity is correctable.      There is tenderness to palpation over the joint line.      There is significant quadriceps atrophy.      Range of Motion: 10 to 110 degrees of  flexion.      The knee is stable to varus-valgus stress and anterior-posterior stress.       There is moderate grinding with range of motion.      There is severe patellofemoral crepitus.      Imaging:   Radiographs were personally reviewed today. There is evidence of severe BILATERAL knee osteoarthritis with RIGHT KNEE patellofemoral and medial and lateral compartment bone on bone apposition and LEFT knee patellofemoral and medial compartment bone-on-bone apposition.      Impression and Plan:   78 y.o. male here for follow-up evaluation of BILATERAL knee.      Arthritis of right knee     His right knee has really become bothersome.  He has experienced poor relief from steroid injections.  He has been performing home physical therapy.  He is taking Aleve for pain relief.  He wonders if it may be time to get a knee replacement.  He will speak to his wife about potential for getting surgery and surgical timing.  Business card was provided.  Additionally will provide him with exercises to continue his home exercise program for bilateral knee arthritis.      RTC:  As needed  -he will determine further follow-up once he finds out about surgical scheduling options     X-rays at next visit: Preop TKA series -right     [place]   R TKA   Attune cemented      _____________________   Katie Ornelas MD    [facility]

## 2024-02-19 ENCOUNTER — OFFICE VISIT (OUTPATIENT)
Dept: ORTHOPEDIC SURGERY | Facility: CLINIC | Age: 79
End: 2024-02-19
Payer: MEDICARE

## 2024-02-19 VITALS — BODY MASS INDEX: 30.61 KG/M2 | WEIGHT: 195 LBS | HEIGHT: 67 IN

## 2024-02-19 DIAGNOSIS — M17.11 ARTHRITIS OF RIGHT KNEE: Primary | ICD-10-CM

## 2024-02-19 PROCEDURE — 1036F TOBACCO NON-USER: CPT | Performed by: STUDENT IN AN ORGANIZED HEALTH CARE EDUCATION/TRAINING PROGRAM

## 2024-02-19 PROCEDURE — 99213 OFFICE O/P EST LOW 20 MIN: CPT | Performed by: STUDENT IN AN ORGANIZED HEALTH CARE EDUCATION/TRAINING PROGRAM

## 2024-02-19 PROCEDURE — 1126F AMNT PAIN NOTED NONE PRSNT: CPT | Performed by: STUDENT IN AN ORGANIZED HEALTH CARE EDUCATION/TRAINING PROGRAM

## 2024-02-19 PROCEDURE — 1159F MED LIST DOCD IN RCRD: CPT | Performed by: STUDENT IN AN ORGANIZED HEALTH CARE EDUCATION/TRAINING PROGRAM

## 2024-02-19 ASSESSMENT — PAIN SCALES - GENERAL: PAINLEVEL_OUTOF10: 4

## 2024-02-19 ASSESSMENT — PAIN - FUNCTIONAL ASSESSMENT: PAIN_FUNCTIONAL_ASSESSMENT: 0-10

## 2024-02-19 ASSESSMENT — PAIN DESCRIPTION - DESCRIPTORS: DESCRIPTORS: SORE

## 2024-02-20 PROBLEM — M17.11 UNILATERAL PRIMARY OSTEOARTHRITIS, RIGHT KNEE: Status: ACTIVE | Noted: 2024-03-12

## 2024-02-22 NOTE — PROGRESS NOTES
Katie Ornelas MD   Adult Reconstruction and Joint Replacement Surgery  Phone: [phone]     Fax: [phone]     PREOPERATIVE CONSULTATION    Name: Kun Castle Jr  : 1945  Date of Visit:  2024    CC: Right knee pain    Clinical History:  Kun Castle Jr is a 78 y.o. male who presents for discussion of upcoming right total knee arthroplasty. The patient reports several years of RIGHT knee pain. The patient has tried at least 3 months of conservative treatments, including Ice, NSAIDs, Activity modification, Physical therapy, Corticosteroid injections , and Xray, and continues to have disabling pain, impaired activities of daily living and worsened quality of life. They rate their pain as up to 10/10.    Patient does not have pain at night. Patient is able to walk indoors only. Patient is currently using nothing as assistive device. Primarily complains of anterior and medial pain. Patient has difficulty with climbing stairs, descending stairs, walking , getting up from a chair, prolonged sitting , and walking on unlevel surfaces . The pain is significantly impacting his ability to perform activities of daily living. Patient reports no longer able to do activities such as walking without pain.     Focused History  PMH: Reviewed and PE/DVT: none  PSH: Reviewed , Hip/Knee replacement: none, Hip/Knee surgery: none, Anesthesia complications: no, Spine surgery: no, Surgical infection: no, and Weight loss surgery: no  Meds: Reviewed, Current Anticoagulants: none, Weight loss medication: no, and Current Opioids: no  Allergies: Reviewed  and The patient reports no contraindications or allergies to cephalosporins, aspirin, NSAIDs or opioids, except as noted above.  FH: No family history of any bleeding or clotting disorders.  SHx: Reviewed, Occupation: retired, Current smoker: no, EtOH intake weekly: minimal, Social support: wife, and Preferred physical activities: walking  Voodoo:  no    PROMs   None     Past Medical History:   Diagnosis Date    Acute maxillary sinusitis, unspecified 04/06/2016    Acute maxillary sinusitis, unspecified    Acute maxillary sinusitis, unspecified 04/06/2016    Acute non-recurrent maxillary sinusitis    Asymptomatic varicose veins of bilateral lower extremities 07/27/2015    Varicose veins of legs    Cough, unspecified 05/16/2014    Cough    Hemorrhage of anus and rectum 10/06/2016    Bright red rectal bleeding    Nocturia 07/25/2016    Nocturia    Orthostatic hypotension 12/02/2015    Sympathotonic orthostatic hypotension    Pain in right knee 08/28/2015    Recurrent knee pain, right    Personal history of diseases of the skin and subcutaneous tissue 07/25/2015    History of rhinophyma    Personal history of other diseases of the digestive system     History of gastroesophageal reflux (GERD)    Personal history of other diseases of the nervous system and sense organs 10/29/2015    History of sciatica    Personal history of other diseases of the respiratory system 04/06/2016    History of acute sinusitis    Sciatica, unspecified side 12/02/2015    Acute sciatica    Vitamin D deficiency, unspecified 04/05/2016    Vitamin D deficiency     Documented in chart and reviewed.     Past Surgical History:   Procedure Laterality Date    COLONOSCOPY  05/14/2014    Colonoscopy (Fiberoptic)       Allergies: He has No Known Allergies.     Medications:  Current Outpatient Medications   Medication Instructions    b complex (B Complex 1, with folic acid,) 0.4 mg tablet oral    cholecalciferol (Vitamin D-3) 50 MCG (2000 UT) tablet 1 tablet, oral, Daily    losartan (COZAAR) 50 mg, oral, Daily, as directed       No family history on file.  Documented in chart and reviewed.     Social History     Tobacco Use    Smoking status: Never     Passive exposure: Never    Smokeless tobacco: Never   Substance Use Topics    Alcohol use: Never       Review of Systems: Review of systems completed  with medical assistant intake. Please refer to this note.     Falls: The patient denies any recent falls or fall-related injuries.    Physical Exam:  BMI: Body mass index is 27.86 kg/m²., which is abnormal. Encouraged to lose weight and to follow up with PCP.    Constitutional: The patient is well-appearing and well groomed.     Neurological/Psychiatric: The patient is alert and oriented to person, place and time. The patient has a normal mood and affect.    Skin Examination: The skin over the right lower extremity, left lower extremity, right upper extremity, and left upper extremity is intact without any evidence of infection or rash.    Cardiovascular Examination: There are no varicosities and the skin is normal temperature, capillary refill normal, arterial pulses normal, no edema.    Lymphatic Examination: There is no lymphatic swelling or palpable lymph nodes present around the joint.    Neurological Examination: Bilateral lower extremities are grossly neurologically intact. Sensation normal, motor function normal.    Gait: The patient ambulates with an antalgic gait.     Lumbar spine:    No tenderness to palpation midline.    Negative straight leg raise bilaterally.    Right Hip Examination:  The skin is intact over the hip.    There is no tenderness over the greater trochanter.    Range of motion is full extension to 100 degrees of flexion.    The hip is stable without subluxation or dislocation.    The hip internally rotates to 15 degrees and externally rotates to 45 degrees.    There is no pain with hip motion.    Left Hip Examination:  The skin is intact over the hip.    There is no tenderness over the greater trochanter.    Range of motion is full extension to 100 degrees of flexion.    The hip is stable without subluxation or dislocation.    The hip internally rotates to 15 degrees and externally rotates to 45 degrees.    There is no pain with hip motion.    Right Knee Examination:  Examination of the  knee reveals the skin to be intact.    There is a moderate effusion in the knee.    The alignment of the knee is Varus.    This deformity is partially correctable.    There is tenderness to palpation over the joint line.    There is significant quadriceps atrophy.    Range of Motion: 10 to 110 degrees of flexion.    The knee is stable to varus-valgus stress and anterior-posterior stress.     There is moderate grinding with range of motion.    There is severe patellofemoral crepitus.    Left Knee Examination:  Examination of the knee reveals the skin to be intact.    There is a moderate effusion in the knee.    The alignment of the knee is Varus.    This deformity is partially correctable.    There is tenderness to palpation over the joint line.    There is significant quadriceps atrophy.    Range of Motion: 10 to 110 degrees of flexion.    The knee is stable to varus-valgus stress and anterior-posterior stress.     There is moderate grinding with range of motion.    There is severe patellofemoral crepitus.    Radiographs:  78 y.o. male here for follow-up evaluation of BILATERAL knee severe osteoarthritis, which is currently being conservatively managed. He is a candidate for CSI on the left today.      Impression:  78 y.o. male presents with severe BILATERAL (right > left) knee osteoarthritis with bone on bone apposition. Patient has tried and failed appropriate conservative measures and now has limitation in ADL's.     Diagnosis:  Arthritis of right knee    Recommendations / Plan:    I have discussed the options in detail with the patient. We have discussed anti-inflammatory medication, activity modification, physical therapy, corticosteroid injections, viscosupplementation injections, partial knee replacement surgery and total knee replacement surgery.    The risks and benefits of all these treatment options have been discussed in detail. The patient has tried at least 3 months of the above conservative treatments  and continues to have disabling pain, impaired activities of daily living and worsened quality of life. The patient is a candidate for a RIGHT  TOTAL knee replacement.     We discussed the hospital stay, postoperative rehab and follow up required as well as the potential risks of surgery including bleeding, need for homologous blood transfusion, infection, need for reoperation, failure of the operation to provide symptomatic relief, need for multiple revision surgeries in the setting of bleeding, wear, infection, instability, stiffness (meaning loss of flexion and/or loss of extension) requiring closed and/or open manipulation and/or revision, damage to major neurovascular structures, venous thrombolic disease, complications of regional and/or general anesthesia, as well as death. The patient also understands that a good result is not guaranteed and that poor outcomes can at times be unavoidable. The patient may also have persistent and chronic pain that could require further intervention.  The patient confirms their understanding of the risks as well as the benefits of surgery. I have explained that although knee replacement generally provides excellent pain relief and improvement in function, some patients continue to have a feeling of stiffness in the knee that can be permanent. We discussed the importance of physical therapy postoperatively to regain knee range of motion. Postoperative pain management strategies were reviewed. We discussed that most patients discharge home in 0-1 days after their surgery depending on their medical health, physical function and social support.      We have reviewed the typical recovery after surgery and have discussed the fact that full recovery can take up to 1 year or even longer.     The patient does not have any of the following contraindications to arthroplasty including active infection of the knee joint, systemic bacteremia, active skin infection or an open wound at the  surgical site, neuropathic arthritis or severe, rapidly progressive neurological disease.     Patient will consider proceeding with RIGHT  TOTAL knee replacement. All questions were answered today. If the patient chooses to move forward with surgical scheduling, they will be enrolled in a preoperative arthroplasty teaching class and the pre-admission testing pathway. The patient was encouraged to contact their primary care physician to discuss fitness for surgery. The patient has sought medical clearance from: Primary Care Physician: 12/12/23 . Pre-admission testing appointment date: 2/27.    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     Approximately 45 minutes were spent on the following tasks:              Preparing for the patient              Reviewing medical records              Taking a patient history              Performing a physical exam              Reviewing treatment options with the patient              Explaining the risks, potential benefits, and alternative to surgery  Explaining the expected rehabilitation after each treatment option  Explaining the potential long term expectations  Evaluating the diagnostic imaging   Templating pre-operative or current imaging  Performing surgical planning and booking     This office note was transcribed with dictation software.  Please excuse any typographical errors, program misunderstandings leading to inadvertent insertions or deletions of inappropriate wording, pronoun errors and other unintentional transcription errors not noticed on proof-reading.

## 2024-02-23 NOTE — PROGRESS NOTES
Thank you for attending our Joint Replacement class today in preparation for your upcoming surgery.  Topics discussed include:    MyChart Enrollment  Communication with Care Team  My Chart is the best form of communication to reach all of your caregivers  You can send messages to specific care givers, or a care team  Continued Education  You will be enrolled in a Total Joint Replacement care plan to receive additional education before and after surgery  You can review a short recording of the class content  Access to Medical Records  You can access test results, office notes, appointments, etc.  You can connect to other healthcare systems who use [employer] ([facility], [facility], [facility], etc.)  [employer]  Program Information  Consent to Enroll    Background/Understanding of Joint Replacement Surgery  Potential for same day discharge  Any questions or concerns to be directed to the surgeon's office    How to Prepare for Surgery  Use of Nicotine Products/Smoking  Stop several weeks before surgery  Such products slow down the healing process and increase risk of post-op infection and complications  Clearance for Surgery  Medical Clearance by Specialists  Dental Clearance  Cracked/Broken/Loose teeth left untreated may postpone surgery  The importance of post-op antibiotics for dental visits per surgeon protocol  Preadmission Testing  **Potential for postponed surgery if appropriate clearance is not obtained  Medication Instruction  Follow instructions provided by the doctor who prescribes your medication (typically, but not limited to cardiologist)  Preadmission testing will provide additional instructions during your appointment on what to stop and what to take as you get closer to surgery  For clarification of these instructions, please call preadmission testing directly - [phone]  Tips for Preparing the home for discharge from the hospital  Care Partner  Requirement for surgery, the patient must have a plan to have  help at home  Potential for postponed surgery if plan for home support cannot be established  How the care partner can help after surgery  CHG Body Wash/Mouth Wash  Follow the instructions given at preadmission testing  Body wash is to be used on the body and hair for 5 washes  Mouthwash is to be used the night before and morning of surgery  **This is a system-wide protocol developed by infectious disease professionals, we will not alter our recommendations for those with sensitive skin or those who have special hair needs.  Please follow the instructions as they are written as this will provide the best infection prevention measures for surgery.  Should you have an allergy to one of the products, please discuss with your preadmission team**    What to Expect in the Hospital/At Home  Morning of Surgery NPO Guidelines  Nothing to eat after midnight  Water can be consumed up to 2 hours prior to arrival  Surgical and Post-Surgical Care Team  Surgical Team  Anesthesia Team  Nursing  Physical Therapy  Care Coordinating  Pharmacy  Hospital Arrival Instructions  Arrive at the time provided to you  Consider traffic patterns (rush-hour) based on arrival time  Have arrangements made for a ride home  If discharging same day, care partner should remain at the hospital  Recovering after Surgery  Recovery Room - Visitors are not brought back  Transition to hospital room - 2nd Floor, Visitors will be directed to your room  The presence of and strategies for controlling surgical pain and swelling  The importance of early mobility  Side effects after surgery  What to expect if staying overnight    Discharge Planning  The intended plan for discharge will be for patients to discharge home  All patients require a care partner (family, friend, neighbor, etc.) to stay with the patient for the first few nights after surgery  The inability to secure help at home will postpone surgery  Home Care Services set up per surgeon order  Physical  Therapy  Occupational Therapy  **If desired, private duty care can be arranged by the patient ahead of time**  Outpatient Physical Therapy per surgeon order    Recovering at Home  Wound Care  Follow wound care instructions found in your discharge paperwork  Bandage is water-resistant and you may shower with the bandage  Do not scrub directly over the bandage  Do not submerge in water until cleared (bathtub, hot tub, pool, etc.)    Post-Op Risk Prevention  Infection Prevention  Promptly seek treatment for any infections post-operatively  Routine dental visits must be postponed for 3 months after surgery  Your surgeon may require antibiotics prior to future dental visits  Any concerns for infection not related directly to the knee or the hip should be managed by your primary care provider  Blood Clots  Be sure to complete the course of blood thinning medication as prescribed by your surgeon  Movement every 1-2 hours during the day is encouraged to prevent blood clots  Monitor for signs of blood clots  Wear compression stockings as prescribed by your surgeon  Constipation  Constipation is common following surgery  Drink plenty of fluids  Take stool softener/laxative as prescribed by your surgeon  Move around frequently  Eat foods high in fiber  Fall Prevention  Prepare home ahead of time to clear space to move with walker  Remove throw rugs and electrical cords from walkways  Install railings near any stairways with more than 2 steps  Use night lights for increased visibility at night  Continue to use your assistive device until cleared by surgeon or physical therapy  Dislocation Prevention - Not all procedures will have dislocation precautions  Follow dislocation precautions provided by your surgeon  It is OK to resume sexual activity about 6 weeks following surgery  Be sure to follow any dislocation precautions assigned    Durable Medical Equipment  Cold Therapy  Breg Cold Therapy Machines  Ice/Gel Packs  Assistive  Devices  Folding Walker with Wheels (in the front only)  No Rollators  Crutches if approved by Physical Therapy and Surgeon after surgery  Hip Kits  Raised Toilet Seats  Additional Compression Stockings    Joint Preservation  Healthy Activities when Cleared  Walking  Swimming  Bike Riding  Activities to Avoid  Refrain from repetitive motions which have a high impact on the joint  Gradual Progression  Progress activity slowly, listen to your body  Common Findings - NORMAL after surgery  Clicking/Grinding  Numbness near incision    Physical Therapy  Prehabilitation exercises  START TODAY ON BOTH LEGS  Surgery Specific Precautions  Follow surgery specific precautions found in your discharge paperwork    Follow-Up Visit  All patients will see their surgeon for a follow up visit after surgery  The visit may range from 2-6 weeks after surgery and is surgeon specific      Please don't hesitate to reach out if you have any additional questions or concerns.    Violette Sosa MBA, BSN, RN-BC  BEENA RamirezN, RN  [facility]  [facility]   [phone]

## 2024-02-25 ENCOUNTER — DOCUMENTATION (OUTPATIENT)
Dept: ORTHOPEDIC SURGERY | Facility: CLINIC | Age: 79
End: 2024-02-25
Payer: MEDICARE

## 2024-02-25 NOTE — PROGRESS NOTES
Katie Ornelas MD    Adult Reconstruction and Joint Replacement Surgery   Phone: [phone]     Fax: [phone]    Surgical Plan of Care       Patient: Kun Castle Jr                                                 MRN: [id number]   Diagnosis: Right knee osteoarthritis       Disposition:   [] Inpatient    [] AMB    [x] AMB Same Day       Site:  [x] [place]  [] Isabel (Time of day request: [] AM [] PM )      Case Complexity:  [x] 1  [] 2  [] 3        Laterality:  [] LEFT  [x] RIGHT  [] BILATERAL      Equipment:  [] [place] Table  [] Large C-Arm  [x] Diana table  [] Flat-plate XR  [] Barak flat     Special Requests:       PROCEDURE(S):        Total Knee (02550):      [x] Depuy: Attune Cemented CR        [] Depuy Velys: Attune Affixium Cementless CR       [] Depuy Attune Cemented CR with Orthalign      [] Depuy Attune Affixium Cementless CR with Orthalign     [] [place] Bebo: Triathlon Knee Cemented CR      [] [place] Bebo: Triathlon Knee Uncemented CR       [] Diana Bebo: Triathlon Knee Cemented PS      [] Diana Bebo: Triathlon Knee Cemented TS     [] Depuy Cones       [] [place] Cones      [] Dacosta and Nephew Journey 2 with Orthalign      [] Clarisse Ti-Nidium with Orthalign          PREOPERATIVE       Tranexamic Acid: [x] IV []Topical        Preoperative Antibiotics: []Hold until intraop cultures obtained []Per ID note        Heme: []Lovenox Bridge         PREADMISSION      LABS:      [x] Routine (CBC, BMP, T+S, INR, EKG)  [] CMP   [] UA   [] Urine cotinine test      [] ESR      []CRP   [x] Hgba1c   [] Albumin   [] Fructosamine   [] PFTs      [] Type/Cross 1 unit  [] D-Dimer  [] Vit D    [] Rheum Factor   [] CCP Abs          IMAGING: Please make sure all imaging is done by PAT visit.      [] Isabel  [] BMC  [] CMC [] Outside:      [] CXR     [] XR Hip/Pelvis [x] XR Knee - still needs         [] EOS Whole Body AP/Lat  [] EOS Hip to Ankle AP/Lat         [] EOS Pelvis AP/Lat (sitting)     [x] XR Hip  to Ankle AP - still needs [] XR Pelvis Lat Sit/Stand        [] CT JASMYN MRI    [] CT BIOMET              [] MRA/CTA                                    [] Fluoro-guided aspiration [] BLE Venous ultrasound to r/o DVT      [] Shoulder       [] Elbow   [] C spine AP, flex/ext lat       [] Other:         CONSULTS:      [x] Medicine  [] Cardiac    [] Anesthesia  [] Weight Management      [] Pain Mgmt    [] Heme  [] Infectious Dis   [] Vascular   [] Plastic Surg  [] Pulm      [] Other:          BLOOD PRODUCTS:       FFP units:  []Factor   []Autologous       DME / REHAB      [] Hinged Knee Brace  [] Knee Immobilizer  [] Philippon  [] Other      [x] Polar Wave Ice Machine  [x] Polar Ice Packs       [] Preop PT Evaluation        [x] In-home PT  [x] Outpatient PT  [x] Home Health Care          HOLD PREOP MEDS:      [] Plavix (7 days)   [] Xarelto (72 hr) [] Coumadin (7 days)       [] Pradaxa  (5 days)        [] Eliquis (72 hrs)  [] Effient (7days)                        DMARDs:   [] Enbrel(2wks)   [] Cimzia (4wks)   [] Humira(2wks) [] Kevzara(4wks)      [] Remicade (4 wks)       [] Orencia(4wks)      [] Xeljanz (2 days)    [] Symponi (4wks)      [] Other:          POSTOP ANTICOAGULATION   [x] ASA 81 mg PO BID x 6 wks [] ASA 325mg PO BID x 6 wks             [] Xarelto      [] Eliquis          [] Coumadin        [] Lovenox             HISTORY/RESULTS      Patient Active Problem List   Diagnosis    Rheumatoid arthritis (CMS/HCC)    Diverticulosis of large intestine without hemorrhage    Hyperlipidemia    Hypertension    Prostate hypertrophy    Vitamin D deficiency    Arthritis    Arthritis of both knees    Asthma due to seasonal allergies    Cyst of joint of hand    Disorder of bone and cartilage    Extensor tendon rupture, non-traumatic, hand and wrist, left    Extensor tenosynovitis of right wrist    Hypogonadism male    Nontraumatic subluxation of extensor tendon at metacarpophalangeal joint of hand, right    Right inguinal  "hernia    SOB (shortness of breath)    Unilateral primary osteoarthritis, right knee         Current Outpatient Medications   Medication Instructions    b complex (B Complex 1, with folic acid,) 0.4 mg tablet oral    cholecalciferol (Vitamin D-3) 50 MCG (2000 UT) tablet 1 tablet, oral, Daily    losartan (COZAAR) 50 mg, oral, Daily, as directed         No Known Allergies      Lab Results   Component Value Date    WBC 4.9 08/10/2021    HGB 12.9 (L) 08/10/2021    HCT 40.2 (L) 08/10/2021     (H) 08/10/2021     08/10/2021      No results found for: \"INR\", \"PROTIME\"      Lab Results   Component Value Date    GLUCOSE 89 08/10/2021    CALCIUM 9.1 08/10/2021     08/10/2021    K 4.6 08/10/2021    CO2 25 08/10/2021     08/10/2021    BUN 27 (H) 08/10/2021    CREATININE 0.91 08/10/2021      No results found for: \"CKTOTAL\", \"CKMB\", \"CKMBINDEX\", \"TROPONINI\"   No results found for: \"HGBA1C\"      No results found for: \"CRP\"   Lab Results   Component Value Date    SEDRATE 5 11/05/2018           "

## 2024-02-25 NOTE — PROGRESS NOTES
Katie Ornelas MD    Adult Reconstruction and Joint Replacement Surgery   Phone: [phone]     Fax: [phone]    Surgical Plan of Care       Patient: Kun Castle Jr                                                 MRN: [id number]   Diagnosis: Right knee osteoarthritis       Disposition:   [] Inpatient    [] AMB    [x] AMB Same Day       Site:  [x] [place]  [] Isabel (Time of day request: [] AM [] PM )      Case Complexity:  [x] 1  [] 2  [] 3        Laterality:  [] LEFT  [x] RIGHT  [] BILATERAL      Equipment:  [] [place] Table  [] Large C-Arm  [x] Diana table  [] Flat-plate XR  [] Barak flat     Special Requests:       PROCEDURE(S):     Total Knee (07615):      [x] Depuy: Attune Cemented CR        [] Depuy Velys: Attune Affixium Cementless CR       [] Depuy Attune Cemented CR with Orthalign      [] Depuy Attune Affixium Cementless CR with Orthalign     [] Check Bebo: Triathlon Knee Cemented CR      [] Diana Bebo: Triathlon Knee Uncemented CR       [] Check Bebo: Triathlon Knee Cemented PS      [] Check Bebo: Triathlon Knee Cemented TS     [] Depuy Cones       [] Diana Cones      [] Dacosta and Nephew Journey 2 with Orthalign      [] Clarisse Ti-Nidium with Orthalign        PREOPERATIVE       Tranexamic Acid: [x] IV []Topical        Preoperative Antibiotics: []Hold until intraop cultures obtained []Per ID note        Heme: []Lovenox Bridge         PREADMISSION      LABS:      [x] Routine (CBC, BMP, T+S, INR, EKG)  [] CMP   [] UA   [] Urine cotinine test      [] ESR      []CRP   [x] Hgba1c   [] Albumin   [] Fructosamine   [] PFTs      [] Type/Cross 1 unit  [] D-Dimer  [] Vit D    [] Rheum Factor   [] CCP Abs          IMAGING: Please make sure all imaging is done by PAT visit.      [] Isabel  [] BMC  [] CMC [] Outside:      [] CXR     [] XR Hip/Pelvis XR Knee         [] EOS Whole Body AP/Lat  [] EOS Hip to Ankle AP/Lat         [] EOS Pelvis AP/Lat (sitting)     [] XR Hip to Ankle AP  [] XR Pelvis  Lat Sit/Stand        [] CT JASMYN MRI    [] CT BIOMET              [] MRA/CTA                                    [] Fluoro-guided aspiration [] BLE Venous ultrasound to r/o DVT      [] Shoulder       [] Elbow   [] C spine AP, flex/ext lat       [] Other:         CONSULTS:      [x] Medicine  [] Cardiac    [] Anesthesia  [] Weight Management      [] Pain Mgmt    [] Heme  [] Infectious Dis   [] Vascular   [] Plastic Surg  [] Pulm      [] Other:          BLOOD PRODUCTS:       FFP units:  []Factor   []Autologous       DME / REHAB      [] Hinged Knee Brace  [] Knee Immobilizer  [] Philippon  [] Other      [x] Polar Wave Ice Machine  [x] Polar Ice Packs       [] Preop PT Evaluation        [x] In-home PT  [x] Outpatient PT  [x] Home Health Care          HOLD PREOP MEDS:      [] Plavix (7 days)   [] Xarelto (72 hr) [] Coumadin (7 days)       [] Pradaxa  (5 days)        [] Eliquis (72 hrs)  [] Effient (7days)                        DMARDs:   [] Enbrel(2wks)   [] Cimzia (4wks)   [] Humira(2wks) [] Kevzara(4wks)      [] Remicade (4 wks)       [] Orencia(4wks)      [] Xeljanz (2 days)    [] Symponi (4wks)      [] Other:          POSTOP ANTICOAGULATION   [x] ASA 81 mg PO BID x 6 wks [] ASA 325mg PO BID x 6 wks             [] Xarelto      [] Eliquis          [] Coumadin        [] Lovenox             HISTORY/RESULTS      Patient Active Problem List   Diagnosis    Rheumatoid arthritis (CMS/HCC)    Diverticulosis of large intestine without hemorrhage    Hyperlipidemia    Hypertension    Prostate hypertrophy    Vitamin D deficiency    Arthritis    Arthritis of both knees    Asthma due to seasonal allergies    Cyst of joint of hand    Disorder of bone and cartilage    Extensor tendon rupture, non-traumatic, hand and wrist, left    Extensor tenosynovitis of right wrist    Hypogonadism male    Nontraumatic subluxation of extensor tendon at metacarpophalangeal joint of hand, right    Right inguinal hernia    SOB (shortness of breath)     "Unilateral primary osteoarthritis, right knee         Current Outpatient Medications   Medication Instructions    b complex (B Complex 1, with folic acid,) 0.4 mg tablet oral    cholecalciferol (Vitamin D-3) 50 MCG (2000 UT) tablet 1 tablet, oral, Daily    losartan (COZAAR) 50 mg, oral, Daily, as directed         No Known Allergies      Lab Results   Component Value Date    WBC 4.9 08/10/2021    HGB 12.9 (L) 08/10/2021    HCT 40.2 (L) 08/10/2021     (H) 08/10/2021     08/10/2021      No results found for: \"INR\", \"PROTIME\"      Lab Results   Component Value Date    GLUCOSE 89 08/10/2021    CALCIUM 9.1 08/10/2021     08/10/2021    K 4.6 08/10/2021    CO2 25 08/10/2021     08/10/2021    BUN 27 (H) 08/10/2021    CREATININE 0.91 08/10/2021      No results found for: \"CKTOTAL\", \"CKMB\", \"CKMBINDEX\", \"TROPONINI\"   No results found for: \"HGBA1C\"      No results found for: \"CRP\"   Lab Results   Component Value Date    SEDRATE 5 11/05/2018           "

## 2024-02-26 ENCOUNTER — HOSPITAL ENCOUNTER (OUTPATIENT)
Dept: RADIOLOGY | Facility: CLINIC | Age: 79
Discharge: HOME | End: 2024-02-26
Payer: MEDICARE

## 2024-02-26 ENCOUNTER — OFFICE VISIT (OUTPATIENT)
Dept: ORTHOPEDIC SURGERY | Facility: CLINIC | Age: 79
End: 2024-02-26
Payer: MEDICARE

## 2024-02-26 VITALS — WEIGHT: 194.2 LBS | BODY MASS INDEX: 27.8 KG/M2 | HEIGHT: 70 IN

## 2024-02-26 DIAGNOSIS — M17.11 ARTHRITIS OF RIGHT KNEE: ICD-10-CM

## 2024-02-26 DIAGNOSIS — M17.11 ARTHRITIS OF RIGHT KNEE: Primary | ICD-10-CM

## 2024-02-26 PROCEDURE — 73562 X-RAY EXAM OF KNEE 3: CPT | Mod: RIGHT SIDE | Performed by: RADIOLOGY

## 2024-02-26 PROCEDURE — 73560 X-RAY EXAM OF KNEE 1 OR 2: CPT | Mod: RIGHT SIDE | Performed by: RADIOLOGY

## 2024-02-26 PROCEDURE — E0143 WALKER FOLDING WHEELED W/O S: HCPCS | Performed by: STUDENT IN AN ORGANIZED HEALTH CARE EDUCATION/TRAINING PROGRAM

## 2024-02-26 PROCEDURE — 77073 BONE LENGTH STUDIES: CPT

## 2024-02-26 PROCEDURE — 77073 BONE LENGTH STUDIES: CPT | Mod: RIGHT SIDE | Performed by: RADIOLOGY

## 2024-02-26 PROCEDURE — 1159F MED LIST DOCD IN RCRD: CPT | Performed by: STUDENT IN AN ORGANIZED HEALTH CARE EDUCATION/TRAINING PROGRAM

## 2024-02-26 PROCEDURE — 1126F AMNT PAIN NOTED NONE PRSNT: CPT | Performed by: STUDENT IN AN ORGANIZED HEALTH CARE EDUCATION/TRAINING PROGRAM

## 2024-02-26 PROCEDURE — 1036F TOBACCO NON-USER: CPT | Performed by: STUDENT IN AN ORGANIZED HEALTH CARE EDUCATION/TRAINING PROGRAM

## 2024-02-26 PROCEDURE — 73562 X-RAY EXAM OF KNEE 3: CPT | Mod: RT

## 2024-02-26 PROCEDURE — 73564 X-RAY EXAM KNEE 4 OR MORE: CPT | Mod: RT

## 2024-02-26 PROCEDURE — 99214 OFFICE O/P EST MOD 30 MIN: CPT | Performed by: STUDENT IN AN ORGANIZED HEALTH CARE EDUCATION/TRAINING PROGRAM

## 2024-02-26 ASSESSMENT — PAIN SCALES - GENERAL: PAINLEVEL_OUTOF10: 5 - MODERATE PAIN

## 2024-02-26 ASSESSMENT — PAIN DESCRIPTION - DESCRIPTORS: DESCRIPTORS: DULL;ACHING

## 2024-02-26 ASSESSMENT — PAIN - FUNCTIONAL ASSESSMENT: PAIN_FUNCTIONAL_ASSESSMENT: 0-10

## 2024-02-27 ENCOUNTER — EDUCATION (OUTPATIENT)
Dept: ORTHOPEDIC SURGERY | Facility: HOSPITAL | Age: 79
End: 2024-02-27
Payer: MEDICARE

## 2024-02-27 ENCOUNTER — HOSPITAL ENCOUNTER (OUTPATIENT)
Dept: RADIOLOGY | Facility: CLINIC | Age: 79
Discharge: HOME | End: 2024-02-27
Payer: MEDICARE

## 2024-02-27 DIAGNOSIS — M17.11 ARTHRITIS OF RIGHT KNEE: ICD-10-CM

## 2024-02-27 PROCEDURE — 73560 X-RAY EXAM OF KNEE 1 OR 2: CPT | Mod: LT

## 2024-02-27 ASSESSMENT — KOOS JR
STRAIGHTENING KNEE FULLY: MODERATE
BENDING TO THE FLOOR TO PICK UP OBJECT: SEVERE
HOW SEVERE IS YOUR KNEE STIFFNESS AFTER FIRST WAKING IN MORNING: SEVERE
GOING UP OR DOWN STAIRS: MODERATE
KOOS JR SCORING: 42.28
RISING FROM SITTING: SEVERE
STANDING UPRIGHT: MODERATE
TWISING OR PIVOTING ON KNEE: SEVERE

## 2024-02-29 ENCOUNTER — EVALUATION (OUTPATIENT)
Dept: PHYSICAL THERAPY | Facility: CLINIC | Age: 79
End: 2024-02-29
Payer: MEDICARE

## 2024-02-29 DIAGNOSIS — M17.11 UNILATERAL PRIMARY OSTEOARTHRITIS, RIGHT KNEE: ICD-10-CM

## 2024-02-29 DIAGNOSIS — M25.561 CHRONIC PAIN OF RIGHT KNEE: Primary | ICD-10-CM

## 2024-02-29 DIAGNOSIS — G89.29 CHRONIC PAIN OF RIGHT KNEE: Primary | ICD-10-CM

## 2024-02-29 PROBLEM — M25.562 BILATERAL CHRONIC KNEE PAIN: Status: ACTIVE | Noted: 2024-02-29

## 2024-02-29 PROCEDURE — 97161 PT EVAL LOW COMPLEX 20 MIN: CPT | Mod: GP | Performed by: PHYSICAL THERAPIST

## 2024-02-29 PROCEDURE — 97110 THERAPEUTIC EXERCISES: CPT | Mod: GP | Performed by: PHYSICAL THERAPIST

## 2024-02-29 ASSESSMENT — ENCOUNTER SYMPTOMS
DEPRESSION: 0
OCCASIONAL FEELINGS OF UNSTEADINESS: 0
LOSS OF SENSATION IN FEET: 0

## 2024-02-29 NOTE — PROGRESS NOTES
Physical Therapy    Physical Therapy Evaluation and Treatment    Patient Name: Kun Castle Jr  MRN: [id number]  Today's Date: 2/29/2024    Time Calculation  Start Time: 1430  Stop Time: 1515  Time Calculation (min): 45 min    Current Problem:   1. Chronic pain of right knee  Follow Up In Physical Therapy      2. Unilateral primary osteoarthritis, right knee  Referral to Physical Therapy        Cert sent 2/29/24-5/29/24  Visit 1  Relevant Past Medical History:   PMHX:as reviewed in EMMR, surgical:colonoscopy  2014  RA, HTN,O/A B/L knees, ,hernia RT inguinal,Sciatica, GERD  Medications: losartan 50mgB complex, Vit D,  Allergies: NKA    Precautions: none  STEADI Fall Risk: 1 (score of 4+ indicates fall risk)       SUBJECTIVE:   79 yo male, sent to PT prior to planned RT TKA-3-12-24 with a hx of several years of disabling RT knee pain. Per MD notes no improvement with corticosteroid injections.     Pt ambulates without assistive device. Pt also has lt knee O/A severe. Stiffness in both legs with walking post sitting.    Pain has been present past 3-4 years.  Imaging:   Diagnostics:  BILATERAL knee severe osteoarthritis     Pain:   Current: 6/10    Highest: 8/10  Location: knees, astrid Rt  Onset: chronic past 3-4 years   Description: stiff, dull  Aggravating Factors: walking, sit to stand, prolonged ist, walk unlevel surfaces, stairs  Relieving Factors: sit    Previous Interventions: injections, no PT       Red flags: negative   Occupation: retired, lives with wife, 2 stairs=performs one at time needs to lead left  Hobbies: gold      Prior Level of Function: could golf    Patient/Family Goal: decrease pain    Outcome Measures: WOMAC-45%    OBJECTIVE:    Cognition: intact  Gait: no assistive device, decreased stance rt   Functional Mobility: sit to stand I plus UE  Palpation: tender lateral joint line RT    Joint Mobility:   Special Tests:  SLR negative   Scour  negative    KNEE  Extension/fex-RT 0-114 pain at end range,  LT 5-120     HIP ROM:  Flexion 130, 130   IR/ER WFL Bilateral acn fig 4 to don socks  EXT WFL noted in gait    Strength:per MMT 5 scale  Psoas RT 5-/5, 5-/5  Quads RT 4+/5, Lt 5-/5   Hams 4+/5 RT, LT 5/5  Hip abd 4/5 bilat  Hip adductors 4+/5 bilat  TA 5 bilat    Palpation:  Jt effusion, tenderness     Flexibility:Tight hamstrings bilateral and gastroc       Treatments:  Therapeutic Exercise: (10 minutes)   Supine:  - heel slides, RT/LT x10  Hip rom all planes  -gluteal sets x10  -quad sets with towel roll x10  -ankle pumps x10    Manual Therapy: ( minutes)    Gait Training: ( minutes)    Neuromuscular Re-education: ( minutes)    Therapeutic Activity: ( 15 minutes)  OP Education: perform hep as tolerated, cont stairs with left lead as Rt knee can be unstable    HEP:  Access Code: [email or web address]  URL: [email or web address]  Date: 02/29/2024  Prepared by: Jessica Degroot    Exercises  - Supine Gluteal Sets  - 2 x daily - 7 x weekly - 10 reps  - Supine Ankle Pumps  - 2 x daily - 7 x weekly - 10 reps  - Supine Heel Slides  - 2 x daily - 7 x weekly - 10 reps  - Supine Quad Set on Towel Roll  - 2 x daily - 7 x weekly - 10 reps    Response to treatment: no worse    ASSESSMENT:   77 yo male, sent to PT prior to planned RT TKA-3-12-24 with a hx of several years of disabling RT knee pain.  Rates 6-8/10 worse with wt bearing, walking, stand  Functional impairments at  WOMAC-45%. Pt has loss knee rom bilateral, decreased strength. RA, HTN,O/A B/L knees, ,hernia RT inguinal,Sciatica, GERD.  Pt could benefit from a few sessions of PT to increase mobility and strength in preparation for his upcoming surgery.     PLAN:  Add hamstring and calf stretches  OP PT PLAN:  Treatment/Interventions: Cryotherapy , Education/Instruction , Gait training , Manual Therapy  , Neuromuscular re-education , Self care/home management , Therapeutic activities , and Therapeutic exercise    PT Plan: Skilled PT   PT Frequency: 2 times per week    Duration:2  Certification Period Start Date:   Certification Period End Date:   Visits Approved: MEDICARE A/B - NO AUTH / [place] VISITS / $0 USED 2024 PT/[facility]  Rehab Potential: Poor  Plan of Care Agreement: Patient         Goals:   GOALS: 2-3 visits-pt having surgery 3/12/24  I with HEP to improve mobility before TKA  Pt will increase knee rom 5 degrees  Pt will report decreased pain 25%.  Womac improve 5 points

## 2024-03-04 ENCOUNTER — TREATMENT (OUTPATIENT)
Dept: PHYSICAL THERAPY | Facility: CLINIC | Age: 79
End: 2024-03-04
Payer: MEDICARE

## 2024-03-04 DIAGNOSIS — G89.29 CHRONIC PAIN OF RIGHT KNEE: ICD-10-CM

## 2024-03-04 DIAGNOSIS — M25.561 CHRONIC PAIN OF RIGHT KNEE: ICD-10-CM

## 2024-03-04 PROCEDURE — 97110 THERAPEUTIC EXERCISES: CPT | Mod: GP | Performed by: PHYSICAL THERAPIST

## 2024-03-04 NOTE — PROGRESS NOTES
Physical Therapy    Physical Therapy Treatment    Patient Name: Kun Castle Jr  MRN: [id number]  Today's Date: 3/4/2024    Time Calculation  Start Time: 1015  Stop Time: 1055  Time Calculation (min): 40 min    Visit #: 2 out of 4  Evaluation date: 2/29/24    Current Problem:   1. Chronic pain of right knee  Follow Up In Physical Therapy          SUBJECTIVE:   Did ok with exercises, still having stiffness, when walking, knocking clicking , denies instability     Precautions: PMHX-RA, HTN,O/A B/L knees, ,hernia RT inguinal,Sciatica, GERD         Pain:   Start of session: 5       OBJECTIVE:    Extension/fex-RT 0-114 pain at end range, LT 5-120     Treatments:  Therapeutic Exercise: (40 minutes)   Supine:  - heel slides, RT/LT x12  -bridging 2x10  -gluteal sets 2x10/5 sec   -quad sets with towel roll x10  -ankle pumps x15  -SAQ x15  -SLR x5 ea leg abdominal brace   Seated ankle heel and toe raises x10  Seated knee flexion heel slides x15 ea   Pt educated re typical livier post op and expectations.      Manual Therapy: ( minutes)    Gait Training: ( minutes)    Neuromuscular Re-education: ( minutes)    Therapeutic Activity: ( minutes)       HEP:  Access Code: [email or web address]  URL: [email or web address]  Date: 02/29/2024  Prepared by: Jessica Degroot     Exercises  - Supine Gluteal Sets  - 2 x daily - 7 x weekly - 10 reps  - Supine Ankle Pumps  - 2 x daily - 7 x weekly - 10 reps  - Supine Heel Slides  - 2 x daily - 7 x weekly - 10 reps  - Supine Quad Set on Towel Roll  - 2 x daily - 7 x weekly - 10 reps    ASSESSMENT:   Pt did well with new livier no greater pain. Pt lacks knee rom, gait still painful.    Post session pain: better 3     PLAN:  2 sessions remain work on rom and strength to make pt a good candidate for 3/12/24 TKA  OP PT PLAN:  Treatment/Interventions: Education/Instruction , Gait training , Manual Therapy  , Neuromuscular re-education , Self care/home management , Therapeutic activities , and Therapeutic  exercise    PT Plan: Skilled PT   PT Frequency: 1-2 times per week  Duration:2  Certification Period Start Date:   Certification Period End Date:   Visits Approved: MEDICARE A/B - NO AUTH / [place] VISITS / . [facility] SUPP   Rehab Potential: Poor  Plan of Care Agreement: Patient         Goals:   GOALS: 2-3 visits-pt having surgery 3/12/24  I with HEP to improve mobility before TKA  Pt will increase knee rom 5 degrees  Pt will report decreased pain 25%.  Womac improve 5 points

## 2024-03-05 ENCOUNTER — TELEPHONE (OUTPATIENT)
Dept: ORTHOPEDIC SURGERY | Facility: HOSPITAL | Age: 79
End: 2024-03-05
Payer: MEDICARE

## 2024-03-05 ENCOUNTER — PRE-ADMISSION TESTING (OUTPATIENT)
Dept: PREADMISSION TESTING | Facility: HOSPITAL | Age: 79
End: 2024-03-05
Payer: MEDICARE

## 2024-03-05 ENCOUNTER — LAB (OUTPATIENT)
Dept: LAB | Facility: LAB | Age: 79
End: 2024-03-05
Payer: MEDICARE

## 2024-03-05 VITALS
TEMPERATURE: 98.6 F | WEIGHT: 193.8 LBS | DIASTOLIC BLOOD PRESSURE: 86 MMHG | HEART RATE: 80 BPM | HEIGHT: 69 IN | BODY MASS INDEX: 28.71 KG/M2 | SYSTOLIC BLOOD PRESSURE: 132 MMHG | OXYGEN SATURATION: 98 %

## 2024-03-05 DIAGNOSIS — M17.11 UNILATERAL PRIMARY OSTEOARTHRITIS, RIGHT KNEE: ICD-10-CM

## 2024-03-05 DIAGNOSIS — Z01.818 PREOP TESTING: Primary | ICD-10-CM

## 2024-03-05 DIAGNOSIS — Z01.818 PREOP TESTING: ICD-10-CM

## 2024-03-05 LAB
ABO GROUP (TYPE) IN BLOOD: NORMAL
ANION GAP SERPL CALC-SCNC: 11 MMOL/L
ANTIBODY SCREEN: NORMAL
BASOPHILS # BLD AUTO: 0.05 X10*3/UL (ref 0–0.1)
BASOPHILS NFR BLD AUTO: 0.8 %
BUN SERPL-MCNC: 32 MG/DL (ref 8–25)
CALCIUM SERPL-MCNC: 9 MG/DL (ref 8.5–10.4)
CHLORIDE SERPL-SCNC: 104 MMOL/L (ref 97–107)
CO2 SERPL-SCNC: 24 MMOL/L (ref 24–31)
CREAT SERPL-MCNC: 1.2 MG/DL (ref 0.4–1.6)
EGFRCR SERPLBLD CKD-EPI 2021: 61 ML/MIN/1.73M*2
EOSINOPHIL # BLD AUTO: 0.25 X10*3/UL (ref 0–0.4)
EOSINOPHIL NFR BLD AUTO: 4.1 %
ERYTHROCYTE [DISTWIDTH] IN BLOOD BY AUTOMATED COUNT: 12.8 % (ref 11.5–14.5)
GLUCOSE SERPL-MCNC: 96 MG/DL (ref 65–99)
HCT VFR BLD AUTO: 39.4 % (ref 41–52)
HGB BLD-MCNC: 12.8 G/DL (ref 13.5–17.5)
IMM GRANULOCYTES # BLD AUTO: 0.01 X10*3/UL (ref 0–0.5)
IMM GRANULOCYTES NFR BLD AUTO: 0.2 % (ref 0–0.9)
LYMPHOCYTES # BLD AUTO: 1.85 X10*3/UL (ref 0.8–3)
LYMPHOCYTES NFR BLD AUTO: 30.1 %
MCH RBC QN AUTO: 32.2 PG (ref 26–34)
MCHC RBC AUTO-ENTMCNC: 32.5 G/DL (ref 32–36)
MCV RBC AUTO: 99 FL (ref 80–100)
MONOCYTES # BLD AUTO: 0.56 X10*3/UL (ref 0.05–0.8)
MONOCYTES NFR BLD AUTO: 9.1 %
NEUTROPHILS # BLD AUTO: 3.42 X10*3/UL (ref 1.6–5.5)
NEUTROPHILS NFR BLD AUTO: 55.7 %
NRBC BLD-RTO: 0 /100 WBCS (ref 0–0)
PLATELET # BLD AUTO: 175 X10*3/UL (ref 150–450)
POTASSIUM SERPL-SCNC: 4.5 MMOL/L (ref 3.4–5.1)
RBC # BLD AUTO: 3.98 X10*6/UL (ref 4.5–5.9)
RH FACTOR (ANTIGEN D): NORMAL
SODIUM SERPL-SCNC: 139 MMOL/L (ref 133–145)
WBC # BLD AUTO: 6.1 X10*3/UL (ref 4.4–11.3)

## 2024-03-05 PROCEDURE — 86850 RBC ANTIBODY SCREEN: CPT

## 2024-03-05 PROCEDURE — 93005 ELECTROCARDIOGRAM TRACING: CPT

## 2024-03-05 PROCEDURE — 87081 CULTURE SCREEN ONLY: CPT | Mod: WESLAB | Performed by: NURSE PRACTITIONER

## 2024-03-05 PROCEDURE — 85025 COMPLETE CBC W/AUTO DIFF WBC: CPT

## 2024-03-05 PROCEDURE — 99204 OFFICE O/P NEW MOD 45 MIN: CPT | Performed by: NURSE PRACTITIONER

## 2024-03-05 PROCEDURE — 36415 COLL VENOUS BLD VENIPUNCTURE: CPT

## 2024-03-05 PROCEDURE — 86900 BLOOD TYPING SEROLOGIC ABO: CPT

## 2024-03-05 PROCEDURE — 80048 BASIC METABOLIC PNL TOTAL CA: CPT

## 2024-03-05 PROCEDURE — 86901 BLOOD TYPING SEROLOGIC RH(D): CPT

## 2024-03-05 RX ORDER — BISMUTH SUBSALICYLATE 262 MG
1 TABLET,CHEWABLE ORAL DAILY
COMMUNITY

## 2024-03-05 RX ORDER — CHLORHEXIDINE GLUCONATE ORAL RINSE 1.2 MG/ML
SOLUTION DENTAL
Qty: 473 ML | Refills: 0 | Status: SHIPPED | OUTPATIENT
Start: 2024-03-11 | End: 2024-03-12

## 2024-03-05 ASSESSMENT — ENCOUNTER SYMPTOMS
ENDOCRINE NEGATIVE: 1
CARDIOVASCULAR NEGATIVE: 1
CONSTITUTIONAL NEGATIVE: 1
PSYCHIATRIC NEGATIVE: 1
GASTROINTESTINAL NEGATIVE: 1
EYES NEGATIVE: 1
HEMATOLOGIC/LYMPHATIC NEGATIVE: 1
NEUROLOGICAL NEGATIVE: 1
RESPIRATORY NEGATIVE: 1

## 2024-03-05 ASSESSMENT — DUKE ACTIVITY SCORE INDEX (DASI)
CAN YOU PARTICIPATE IN MODERATE RECREATIONAL ACTIVITIES LIKE GOLF, BOWLING, DANCING, DOUBLES TENNIS OR THROWING A BASEBALL OR FOOTBALL: YES
CAN YOU TAKE CARE OF YOURSELF (EAT, DRESS, BATHE, OR USE TOILET): YES
CAN YOU DO MODERATE WORK AROUND THE HOUSE LIKE VACUUMING, SWEEPING FLOORS OR CARRYING GROCERIES: YES
TOTAL_SCORE: 50.7
CAN YOU DO LIGHT WORK AROUND THE HOUSE LIKE DUSTING OR WASHING DISHES: YES
CAN YOU WALK A BLOCK OR TWO ON LEVEL GROUND: YES
CAN YOU DO HEAVY WORK AROUND THE HOUSE LIKE SCRUBBING FLOORS OR LIFTING AND MOVING HEAVY FURNITURE: YES
CAN YOU HAVE SEXUAL RELATIONS: YES
CAN YOU CLIMB A FLIGHT OF STAIRS OR WALK UP A HILL: YES
CAN YOU DO YARD WORK LIKE RAKING LEAVES, WEEDING OR PUSHING A MOWER: YES
CAN YOU RUN A SHORT DISTANCE: YES
CAN YOU WALK INDOORS, SUCH AS AROUND YOUR HOUSE: YES
DASI METS SCORE: 9
CAN YOU PARTICIPATE IN STRENOUS SPORTS LIKE SWIMMING, SINGLES TENNIS, FOOTBALL, BASKETBALL, OR SKIING: NO

## 2024-03-05 ASSESSMENT — PAIN SCALES - GENERAL: PAINLEVEL_OUTOF10: 5 - MODERATE PAIN

## 2024-03-05 ASSESSMENT — PAIN DESCRIPTION - DESCRIPTORS: DESCRIPTORS: ACHING

## 2024-03-05 ASSESSMENT — CHADS2 SCORE
PRIOR STROKE OR TIA OR THROMBOEMBOLISM: NO
CHF: NO
HYPERTENSION: YES
CHADS2 SCORE: 2
AGE GREATER THAN OR EQUAL TO 75: YES
DIABETES: NO

## 2024-03-05 ASSESSMENT — PAIN - FUNCTIONAL ASSESSMENT: PAIN_FUNCTIONAL_ASSESSMENT: 0-10

## 2024-03-05 ASSESSMENT — ACTIVITIES OF DAILY LIVING (ADL): EFFECT OF PAIN ON DAILY ACTIVITIES: INCREASES WITH AMBULATION

## 2024-03-05 NOTE — H&P (VIEW-ONLY)
CPM/PAT Evaluation       Name: Kun Castle Jr (Kun Castle Jr)  /Age: 1945/78 y.o.     In-Person       Chief Complaint: Right knee pain    HPI    Pt is an 78 year old male with right knee arthritis. Pt reports having right knee pain for several years that has progressively worsened. Pt describes the pain as an non-radiating aching grinding pain that occurs with transitioning from a sitting to standing position, climbing stairs, and walking on uneven surfaces.  Pt has tried cortisone injections, gel injections, and taking NSAIDS in the past with no improvement of the pain. Pt denies weakness, numbness, or tingling in his right foot. Pt was examined by his surgeon and has been scheduled for right total knee resurfacing arthroplasty. Pt denies CP,  SOB, or dizziness.     Past Medical History:   Diagnosis Date    Asymptomatic varicose veins of bilateral lower extremities 2015    Varicose veins of legs    Diverticulosis     Hemorrhage of anus and rectum 10/06/2016    Bright red rectal bleeding    Hypertension     Nocturia 2016    Nocturia    Orthostatic hypotension 2015    Sympathotonic orthostatic hypotension    Pain in right knee 2015    Recurrent knee pain, right    Personal history of diseases of the skin and subcutaneous tissue 2015    History of rhinophyma    Personal history of other diseases of the digestive system     History of gastroesophageal reflux (GERD)    Personal history of other diseases of the nervous system and sense organs 10/29/2015    History of sciatica    Rheumatoid arthritis (CMS/HCC)     Sciatica, unspecified side 2015    Acute sciatica    Vitamin D deficiency, unspecified 2016    Vitamin D deficiency       Past Surgical History:   Procedure Laterality Date    CATARACT EXTRACTION, BILATERAL      CHOLECYSTECTOMY      COLONOSCOPY  2014    Colonoscopy (Fiberoptic)    HERNIA REPAIR      TENDON REPAIR Left     left hand     "TONSILLECTOMY       Social History     Tobacco Use    Smoking status: Never     Passive exposure: Never    Smokeless tobacco: Never   Substance Use Topics    Alcohol use: Yes     Comment: RARE     Social History     Substance and Sexual Activity   Drug Use Never     No Known Allergies    Current Outpatient Medications   Medication Sig Dispense Refill    b complex (B Complex 1, with folic acid,) 0.4 mg tablet Take 1 tablet by mouth once daily.      [START ON 3/11/2024] chlorhexidine (Peridex) 0.12 % solution Use as directed Do not start before March 11, 2024. 473 mL 0    cholecalciferol (Vitamin D-3) 50 MCG (2000 UT) tablet Take 1 tablet (2,000 Units) by mouth once daily.      losartan (Cozaar) 50 mg tablet TAKE 1 TABLET BY MOUTH DAILY AS  DIRECTED (Patient taking differently: Take 1 tablet (50 mg) by mouth once daily. EVENING) 90 tablet 3    multivitamin tablet Take 1 tablet by mouth once daily. SENIOR MULTIVITAMIN       No current facility-administered medications for this visit.     Review of Systems   Constitutional: Negative.    HENT: Negative.     Eyes: Negative.    Respiratory: Negative.     Cardiovascular: Negative.    Gastrointestinal: Negative.    Endocrine: Negative.    Genitourinary: Negative.    Musculoskeletal:         Right knee pain   Skin: Negative.    Neurological: Negative.    Hematological: Negative.    Psychiatric/Behavioral: Negative.       /86   Pulse 80   Temp 37 °C (98.6 °F) (Temporal)   Ht 1.753 m (5' 9\")   Wt 87.9 kg (193 lb 12.8 oz)   SpO2 98%   BMI 28.62 kg/m²     Physical Exam  Vitals reviewed.   Constitutional:       Appearance: Normal appearance.   HENT:      Head: Normocephalic and atraumatic.      Nose: Nose normal.      Mouth/Throat:      Mouth: Mucous membranes are moist.      Pharynx: Oropharynx is clear.   Eyes:      Extraocular Movements: Extraocular movements intact.      Conjunctiva/sclera: Conjunctivae normal.      Pupils: Pupils are equal, round, and reactive to " light.   Cardiovascular:      Rate and Rhythm: Normal rate and regular rhythm.      Pulses: Normal pulses.      Heart sounds: Normal heart sounds.   Pulmonary:      Effort: Pulmonary effort is normal.      Breath sounds: Normal breath sounds.   Abdominal:      General: Bowel sounds are normal.      Palpations: Abdomen is soft.   Musculoskeletal:      Cervical back: Normal range of motion and neck supple.      Right lower leg: Edema (trace nonpitting edema) present.      Left lower leg: Edema (trace nonpitting edema) present.   Skin:     General: Skin is warm and dry.   Neurological:      Mental Status: He is alert.   Psychiatric:         Mood and Affect: Mood normal.         Behavior: Behavior normal.         Thought Content: Thought content normal.         Judgment: Judgment normal.        PAT AIRWAY:   Airway:     Mallampati::  IV    TM distance::  >3 FB    Neck ROM::  Full    ASA:II  DASI: 50.7  METS: 9  CHADS: 4%  RCRI: 0.4%  STOP BANG: 3    Assessment and Plan:     Unilateral primary osteoarthritis right knee: arthroplasty resurfacing total knee right.  HTN: pt is taking losartan.  Rheumatoid arthritis: Pt does not see a specialist.    EKG performed in PAT: preliminary report: Sinus rhythm with 1st degree AV block, left axis deviation.     Alyssa Gonzalez, APRN-CNP

## 2024-03-05 NOTE — TELEPHONE ENCOUNTER
Thank you for taking my call today.  All questions were answered at the time of the call, but please feel free to reach out to me via [facility] or phone, [phone], with any new questions or concerns.       We confirmed that you opted to enroll in our [id number] program so your discharge prescriptions will be available to take home at the time of discharge.  Please bring any prescription insurance coverage with you on the morning of surgery so that we can enter the information into our system.     We confirmed that your plan would be to Discharge Home same day (Rapid Recovery).    We confirmed that you have DME needed for recovery.     Use the provided body wash for 4 days before surgery and complete a 5th shower on the morning of surgery, this includes your body and hair.  Follow the directions as provided during preadmission testing.  The mouth wash will be used the night before and the morning of surgery.       As a reminder, if you do not hear from our team, please call [phone] between 2pm and 3pm the business day before your surgery to confirm your arrival time and details.        Please don't hesitate to reach out with additional questions or concerns.    Violette Sosa MBA, BSN, RN-BC  BEENA RamirezN, RN  Orthopedic Program Navigators  [facility]  [phone]

## 2024-03-07 ENCOUNTER — TREATMENT (OUTPATIENT)
Dept: PHYSICAL THERAPY | Facility: CLINIC | Age: 79
End: 2024-03-07
Payer: MEDICARE

## 2024-03-07 DIAGNOSIS — G89.29 CHRONIC PAIN OF RIGHT KNEE: ICD-10-CM

## 2024-03-07 DIAGNOSIS — M25.561 CHRONIC PAIN OF RIGHT KNEE: ICD-10-CM

## 2024-03-07 LAB — STAPHYLOCOCCUS SPEC CULT: ABNORMAL

## 2024-03-07 PROCEDURE — 97110 THERAPEUTIC EXERCISES: CPT | Mod: GP | Performed by: PHYSICAL THERAPIST

## 2024-03-07 RX ORDER — NAPROXEN SODIUM 220 MG/1
81 TABLET, FILM COATED ORAL 2 TIMES DAILY
Qty: 84 TABLET | Refills: 0 | Status: SHIPPED | OUTPATIENT
Start: 2024-03-07 | End: 2024-04-23

## 2024-03-07 RX ORDER — TRAMADOL HYDROCHLORIDE 50 MG/1
50-100 TABLET ORAL EVERY 6 HOURS PRN
Qty: 40 TABLET | Refills: 0 | Status: SHIPPED | OUTPATIENT
Start: 2024-03-07 | End: 2024-03-17

## 2024-03-07 RX ORDER — SENNOSIDES 8.6 MG/1
1 TABLET ORAL DAILY
Qty: 15 TABLET | Refills: 0 | Status: SHIPPED | OUTPATIENT
Start: 2024-03-07 | End: 2024-03-27

## 2024-03-07 RX ORDER — PANTOPRAZOLE SODIUM 40 MG/1
40 TABLET, DELAYED RELEASE ORAL
Qty: 30 TABLET | Refills: 0 | Status: SHIPPED | OUTPATIENT
Start: 2024-03-07 | End: 2024-04-11

## 2024-03-07 RX ORDER — OXYCODONE HYDROCHLORIDE 5 MG/1
5-10 TABLET ORAL EVERY 6 HOURS PRN
Qty: 40 TABLET | Refills: 0 | Status: SHIPPED | OUTPATIENT
Start: 2024-03-07 | End: 2024-03-17

## 2024-03-07 RX ORDER — DOCUSATE SODIUM 100 MG/1
100 CAPSULE, LIQUID FILLED ORAL 2 TIMES DAILY
Qty: 30 CAPSULE | Refills: 0 | Status: SHIPPED | OUTPATIENT
Start: 2024-03-07 | End: 2024-03-27

## 2024-03-07 RX ORDER — CEFADROXIL 500 MG/1
500 CAPSULE ORAL 2 TIMES DAILY
Qty: 14 CAPSULE | Refills: 0 | Status: SHIPPED | OUTPATIENT
Start: 2024-03-07 | End: 2024-03-19

## 2024-03-07 RX ORDER — ACETAMINOPHEN 500 MG
1000 TABLET ORAL EVERY 8 HOURS
Qty: 360 TABLET | Refills: 0 | Status: SHIPPED | OUTPATIENT
Start: 2024-03-07 | End: 2024-05-06

## 2024-03-07 RX ORDER — MELOXICAM 15 MG/1
15 TABLET ORAL DAILY
Qty: 60 TABLET | Refills: 0 | Status: SHIPPED | OUTPATIENT
Start: 2024-03-07 | End: 2024-05-06

## 2024-03-07 RX ORDER — ONDANSETRON 4 MG/1
4 TABLET, FILM COATED ORAL EVERY 8 HOURS PRN
Qty: 20 TABLET | Refills: 0 | Status: SHIPPED | OUTPATIENT
Start: 2024-03-07

## 2024-03-07 NOTE — DISCHARGE INSTRUCTIONS
Katie Ornelas MD   Adult Reconstruction and Joint Replacement Surgery  Office: [phone]     Fax: [phone]       Total Knee Arthroplasty   Postoperative Instructions    CONTACT INFORMATION  Katie Ornelas MD  Joint Replacement Surgeon   Kianna Ng      [phone]     Violette Sosa MBA, BSN, RN-BC  Ortho Coordinator [facility]  [phone]    Reid Underwood RN, BSN  Ortho Coordinator [facility]  [phone]    Brenna Owen BSN-BC  Ortho Nurse Navigator  [phone]    DRIVING AFTER SURGERY   Please discuss post-surgical, long-distance travel with your surgeon. You may not drive until you are off narcotics and cleared by your surgical team.     WOUND CARE   A padded wrap (ace wrap) was placed on your knee on the day of surgery. You may remove this on the 2nd day after surgery.     A waterproof dressing was placed over your surgical site. You may shower over this dressing after surgery and pat it dry. Please do not scrub, soak, or submerge your surgical site for at least three weeks after surgery to allow your incision to heal. Avoid using creams and ointments around your surgical site while it is healing.    Your dressing will be removed on post-operative day 7 by your physical therapist. You may leave the incision open to air after the bandage has been removed. Please do not submerge your incision in a hot tub/pool/lake/etc. until cleared by your surgeon. Please contact the office if there are any concerns with your wound.     Pain, swelling, and bruising are normal after total knee replacement surgery. You may alleviate these symptoms by following the post-operative pain regimen that was prescribed, icing your surgical site multiple times a day, and elevating your extremity throughout the day.     ACTIVITY AND PRECAUTIONS  Please follow the post-operative activity precautions that were described to you by your surgical team and physical therapists (if  applicable).    Weightbearing restriction: weightbearing as tolerated.     DISCHARGE MEDICATIONS  Pain  Please take the pain medications that were prescribed to you according to the prescribed schedule. You may not operate a motor vehicle while taking narcotic medications (e.g. Oxycodone, Tramadol).     Please take Tylenol and NSAIDs (if you are able) on a schedule as discussed in clinic. Please take the prescribed Pantoprazole to protect your stomach from ulceration after surgery while taking NSAIDs.     Please wean off the narcotic pain medications as soon as you are able.     Blood-Thinners  Please take the blood thinning medications that were prescribed according to the instructions from your surgeon. These are typically continued for 6 weeks postoperatively. This schedule may differ if you were already on blood-thinning medication prior to your surgery.     Nausea  You may feel nauseous after surgery due to the anesthetics or pain medications you are taking. You may take anti-nausea medication (e.g. Ondansetron) to help with these symptoms.     Stool Softeners   Please take the stool softeners that were prescribed at discharge (e.g. Colace, Senna) while you are on narcotic pain medications to minimize your risk of constipation.    Home Medications   You may restart your home medications at time of discharge according to your discharge instructions.    PHYSICAL THERAPY AND OCCUPATIONAL THERAPY   In-home physical therapy and occupational therapy will start within a few days of your discharge to home. You will transition to outpatient physical therapy around 2-3 weeks after your surgery.     FOLLOW-UP  You will see your surgical team around 2 weeks after surgery for a wound check (unless otherwise arranged) and between 4-6 weeks after surgery for X-rays and clinical evaluation.    CALL YOUR SURGEON IF YOU HAVE:   Drainage that is not contained by your surgical dressing.  Redness, pain or swelling in your calf.    Severe pain that is not controlled by the pain regimen prescribed.   Fever >101 Fahrenheit presents for at least 48 hours or other signs of infection (wound drainage, redness around your surgical incision, increased joint pain)  Go to the emergency department or call 911 if you experience chest pain, shortness of breath or other emergent symptoms. Do not call your surgeon first.     ANTIBIOTIC PROPHYLAXIS AFTER JOINT REPLACEMENT SURGERY   Although it is a rare occurrence, an artificial joint can become infected by the bloodstream carrying infection from another part of the body to the replaced joint.  Therefore, it is important that any bacterial infection be treated promptly. If you are unsure of whether you need to take antibiotics, please call the office before taking any medication.  We advise that you take antibiotics prior to the following invasive procedures for a lifetime. Please wait at least 3-6 months after joint replacement surgery before undergoing dental work or cleaning.    Please take antibiotics one hour before any of the following procedures:  Routine dental cleaning or dental procedure  Root canal  Podiatry procedures that involve cutting into the skin  Dermatologic procedures that involve cutting into the skin.  (Not required for laser or freezing of skin)  Invasive procedures regarding the urinary tract     Antibiotics are not required for the following procedures:   Manicures/Pedicures  Colonoscopy/Endoscopy/Sigmoidoscopy  Routine gynecologic exams/procedures/PAP smears, D&C's  Cataract/laser eye surgery  Injection or blood work  Routine colds and flu and minor cuts and bruises    You may have a flu shot at any time following your surgery.

## 2024-03-07 NOTE — PROGRESS NOTES
"Physical Therapy    Physical Therapy Treatment    Patient Name: Kun Castle Jr  MRN: [id number]  Today's Date: 3/7/2024    Time Calculation  Start Time: 1156  Stop Time: 1234  Time Calculation (min): 38 min    Visit #: 3 out of 4  Evaluation date: 2/29/24    Current Problem:   1. Chronic pain of right knee  Follow Up In Physical Therapy          SUBJECTIVE:   Did ok with exercises, still having stiffness,pain when walking,\" knocking clicking\" , denies instability     Precautions: PMHX-RA, HTN,O/A B/L knees, ,hernia RT inguinal,Sciatica, GERD         Pain:   Start of session: 4-5/10       OBJECTIVE:    Extension/fex-RT 0-119 pain at end range, LT 5-120     Treatments:  Therapeutic Exercise: (40 minutes)   Supine:  - heel slides, RT/LT 2 x10  -bridging 2x12  -gluteal sets 2x10/5 sec   -quad sets with towel roll x10  -ankle pumps 2x10  -SAQ 2x10 ea leg  -LAQ 2x10 ea leg  -SLR 2x5 ea leg abdominal brace   side lying hip abduction SLR x10 ea  Side lying hip ER/clam 1x10, 1x5 ea  -  Seated ankle heel and toe raises x10  Seated knee flexion heel slides x15 ea   Pt educated re typical livier post op and expectations.  Discussed use of walker, how it should fit and to remove tripping hazards.      Manual Therapy: ( minutes)    Gait Training: ( minutes)    Neuromuscular Re-education: ( minutes)    Therapeutic Activity: ( minutes)       HEP:  Access Code: [email or web address]  URL: [email or web address]  Date: 02/29/2024  Prepared by: Jessica Degroot     Exercises  - Supine Gluteal Sets  - 2 x daily - 7 x weekly - 10 reps  - Supine Ankle Pumps  - 2 x daily - 7 x weekly - 10 reps  - Supine Heel Slides  - 2 x daily - 7 x weekly - 10 reps  - Supine Quad Set on Towel Roll  - 2 x daily - 7 x weekly - 10 reps    ASSESSMENT:   Pt did well with new livier no greater pain. Pt gained knee flexion rom. Pt lacks knee rom, gait still painful. Pt has a good understanding of livier he will do post op,precautions, expectations.    Post session pain: same "      PLAN:  1 sessions remain work on rom and strength to make pt a good candidate for 3/12/24 TKA and then discharge, recheck goals   OP PT PLAN:  Treatment/Interventions: Education/Instruction , Gait training , Manual Therapy  , Neuromuscular re-education , Self care/home management , Therapeutic activities , and Therapeutic exercise    PT Plan: Skilled PT   PT Frequency: 1-2 times per week  Duration:2  Certification Period Start Date:   Certification Period End Date:   Visits Approved: MEDICARE A/B - NO AUTH / [place] VISITS / . AARP MC SUPP   Rehab Potential: Poor  Plan of Care Agreement: Patient         Goals:   GOALS: 2-3 visits-pt having surgery 3/12/24  I with HEP to improve mobility before TKA  Pt will increase knee rom 5 degrees  Pt will report decreased pain 25%.  Womac improve 5 points

## 2024-03-08 DIAGNOSIS — M17.11 ARTHRITIS OF RIGHT KNEE: Primary | ICD-10-CM

## 2024-03-08 RX ORDER — MUPIROCIN 20 MG/G
OINTMENT TOPICAL 2 TIMES DAILY
Qty: 50 G | Refills: 0 | Status: SHIPPED | OUTPATIENT
Start: 2024-03-08 | End: 2024-03-13

## 2024-03-10 NOTE — PROGRESS NOTES
Katie Ornelas MD   Adult Reconstruction and Joint Replacement Surgery  Phone: [phone]     Fax: [phone]     KNEE REPLACEMENT POST-OP VISIT     Name: Kun Castle Jr  : 1945  Date of Visit: 24    Chief Complaint:  Right Total Knee Replacement Surgery Follow-Up    History of Present Illness:    The patient is now 2 weeks 6 days status post right Total knee replacement surgery.    The patient is doing home physical therapy and is progressing well.  He is starting outpatient physical therapy soon.    Denies fevers or drainage from the incision.    Patient is walking with nothing for assistive device.    The patient has no fevers or drainage from the incision.    The patient is currently taking nothing for pain.     The patient is currently taking aspirin for DVT prophylaxis.    There are no concerns.    Physical Exam:    The patient is well appearing, alert and oriented to person place and time.    The incision is healing without complication. No drainage or evidence of infection.    Range of motion is excellent and strength is improving. ROM today is full to 105 deg.     There is no instability of the joint.    Homans sign is negative.    Neurologic, and vascular examinations are normal.    PROMs   None       Imaging:    X-rays were personally reviewed today and show implants in good position with no evidence of complication. There have been no significant interval changes.    Impression and Plan:  78 y.o. male who is 2 weeks 6 days s/p Right Total knee replacement.    The patient is doing well following Total knee replacement surgery.  Antibiotic prophylaxis prior to dental procedures were reviewed.  Long-term failure mechanisms were reviewed.  A new PT prescription was given to the patient and we reviewed exercises to be performed at home to work on improving range of motion and strength. The patient was asked to contact the office sooner if there are any concerns.    RTC: 6 weeks      X-rays at next visit: Postop TKA series    _____________________  Katie Ornelas MD  [facility]

## 2024-03-11 ENCOUNTER — TREATMENT (OUTPATIENT)
Dept: PHYSICAL THERAPY | Facility: CLINIC | Age: 79
End: 2024-03-11
Payer: MEDICARE

## 2024-03-11 ENCOUNTER — ANESTHESIA EVENT (OUTPATIENT)
Dept: OPERATING ROOM | Facility: HOSPITAL | Age: 79
End: 2024-03-11
Payer: MEDICARE

## 2024-03-11 DIAGNOSIS — G89.29 CHRONIC PAIN OF RIGHT KNEE: Primary | ICD-10-CM

## 2024-03-11 DIAGNOSIS — M25.561 CHRONIC PAIN OF RIGHT KNEE: Primary | ICD-10-CM

## 2024-03-11 PROCEDURE — 97110 THERAPEUTIC EXERCISES: CPT | Mod: GP | Performed by: PHYSICAL THERAPIST

## 2024-03-11 NOTE — PROGRESS NOTES
"Physical Therapy    Physical Therapy Treatment and discharge    Patient Name: Kun Castle Jr  MRN: [id number]  Today's Date: 3/11/2024    Time Calculation  Start Time: 1030  Stop Time: 1110  Time Calculation (min): 40 min    Visit #: 4 out of 4  Evaluation date: 2/29/24    Current Problem:   1. Chronic pain of right knee              SUBJECTIVE:   Pt is going to have elective TKA RT tomorrow  Did ok with exercises, still having stiffness,pain when walking,\" knocking clicking\" , denies instability     Precautions: PMHX-RA, HTN,O/A B/L knees, ,hernia RT inguinal,Sciatica, GERD         Pain:   Start of session: 4-5/10       OBJECTIVE:    Antalgic gait   Extension/fex-RT 0-119 pain at end range, LT 5-120   Rt leg more external rotation with active extension vs left   WOMAC- 36/96 37.5%    Treatments:  Therapeutic Exercise: ( minutes)   Supine:  - heel slides, RT/LT 2 x12  -bridging 2x14  -gluteal sets 2x12/5 sec   -quad sets with towel roll x10 ea   -ankle pumps 2x10  -SAQ 2x12 ea leg  -LAQ 2x 12 ea leg  -SLR 2x6 ea leg abdominal brace   side lying hip abduction SLR x15 ea  Side lying hip ER/clam 2x10 ea    Seated ankle heel and toe raises x15  Seated knee flexion heel slides x15 ea   Standing balance DLS EC x1 30 sec ea   Pt educated re typical livier post op and expectations.  Discussed use of walker, how it should fit and to remove tripping hazards.         HEP:  Access Code: [email or web address]  URL: [email or web address]  Date: 02/29/2024  Prepared by: Jessica Degroot     Exercises  - Supine Gluteal Sets  - 2 x daily - 7 x weekly - 10 reps  - Supine Ankle Pumps  - 2 x daily - 7 x weekly - 10 reps  - Supine Heel Slides  - 2 x daily - 7 x weekly - 10 reps  - Supine Quad Set on Towel Roll  - 2 x daily - 7 x weekly - 10 reps    ASSESSMENT:   Pt made very minimal change in WOMAC scores but slight improvement.  Pt did well with new livier no greater pain. Pt gained knee flexion rom. Pt lacks knee rom, gait still painful. Pt has a " good understanding of livier he will do post op,precautions, expectations.    Post session pain: same      PLAN:  Discharge as pt is having surgery     OP PT PLAN:  Treatment/Interventions: Education/Instruction , Gait training , Manual Therapy  , Neuromuscular re-education , Self care/home management , Therapeutic activities , and Therapeutic exercise    PT Plan: Skilled PT   PT Frequency: 1-2 times per week  Duration:2  Certification Period Start Date:   Certification Period End Date:   Visits Approved: MEDICARE A/B - NO AUTH / [place] VISITS / . AARP MC SUPP   Rehab Potential: Poor  Plan of Care Agreement: Patient         Goals:   GOALS: 2-3 visits-pt having surgery 3/12/24  I with HEP to improve mobility before TKA>>>A  Pt will increase knee rom 5 degrees>>>RT 0-114 pain at eval,RT 0-119 pain at end range>>>A  Pt will report decreased pain 25%.>>>NA  Womac improve 5 points WOMAC-45% at eval, 37.5%>>NA

## 2024-03-12 ENCOUNTER — DOCUMENTATION (OUTPATIENT)
Dept: HOME HEALTH SERVICES | Facility: HOME HEALTH | Age: 79
End: 2024-03-12

## 2024-03-12 ENCOUNTER — HOSPITAL ENCOUNTER (OUTPATIENT)
Facility: HOSPITAL | Age: 79
Setting detail: OUTPATIENT SURGERY
Discharge: HOME | End: 2024-03-12
Attending: STUDENT IN AN ORGANIZED HEALTH CARE EDUCATION/TRAINING PROGRAM | Admitting: STUDENT IN AN ORGANIZED HEALTH CARE EDUCATION/TRAINING PROGRAM
Payer: MEDICARE

## 2024-03-12 ENCOUNTER — ANESTHESIA (OUTPATIENT)
Dept: OPERATING ROOM | Facility: HOSPITAL | Age: 79
End: 2024-03-12
Payer: MEDICARE

## 2024-03-12 ENCOUNTER — PHARMACY VISIT (OUTPATIENT)
Dept: PHARMACY | Facility: CLINIC | Age: 79
End: 2024-03-12
Payer: COMMERCIAL

## 2024-03-12 ENCOUNTER — APPOINTMENT (OUTPATIENT)
Dept: RADIOLOGY | Facility: HOSPITAL | Age: 79
End: 2024-03-12
Payer: MEDICARE

## 2024-03-12 ENCOUNTER — HOME HEALTH ADMISSION (OUTPATIENT)
Dept: HOME HEALTH SERVICES | Facility: HOME HEALTH | Age: 79
End: 2024-03-12
Payer: MEDICARE

## 2024-03-12 VITALS
TEMPERATURE: 97.9 F | HEART RATE: 81 BPM | OXYGEN SATURATION: 98 % | SYSTOLIC BLOOD PRESSURE: 167 MMHG | RESPIRATION RATE: 12 BRPM | DIASTOLIC BLOOD PRESSURE: 81 MMHG | HEIGHT: 69 IN | BODY MASS INDEX: 28.08 KG/M2 | WEIGHT: 189.6 LBS

## 2024-03-12 DIAGNOSIS — M17.11 UNILATERAL PRIMARY OSTEOARTHRITIS, RIGHT KNEE: Primary | ICD-10-CM

## 2024-03-12 LAB
ABO GROUP (TYPE) IN BLOOD: NORMAL
RH FACTOR (ANTIGEN D): NORMAL

## 2024-03-12 PROCEDURE — 7100000009 HC PHASE TWO TIME - INITIAL BASE CHARGE: Performed by: STUDENT IN AN ORGANIZED HEALTH CARE EDUCATION/TRAINING PROGRAM

## 2024-03-12 PROCEDURE — 97162 PT EVAL MOD COMPLEX 30 MIN: CPT | Mod: GP

## 2024-03-12 PROCEDURE — 94760 N-INVAS EAR/PLS OXIMETRY 1: CPT

## 2024-03-12 PROCEDURE — 73560 X-RAY EXAM OF KNEE 1 OR 2: CPT | Mod: RIGHT SIDE | Performed by: RADIOLOGY

## 2024-03-12 PROCEDURE — 2720000007 HC OR 272 NO HCPCS: Performed by: STUDENT IN AN ORGANIZED HEALTH CARE EDUCATION/TRAINING PROGRAM

## 2024-03-12 PROCEDURE — 2500000004 HC RX 250 GENERAL PHARMACY W/ HCPCS (ALT 636 FOR OP/ED): Performed by: ANESTHESIOLOGY

## 2024-03-12 PROCEDURE — 27447 TOTAL KNEE ARTHROPLASTY: CPT | Performed by: STUDENT IN AN ORGANIZED HEALTH CARE EDUCATION/TRAINING PROGRAM

## 2024-03-12 PROCEDURE — 2500000005 HC RX 250 GENERAL PHARMACY W/O HCPCS: Performed by: ANESTHESIOLOGY

## 2024-03-12 PROCEDURE — 2500000005 HC RX 250 GENERAL PHARMACY W/O HCPCS: Performed by: ANESTHESIOLOGIST ASSISTANT

## 2024-03-12 PROCEDURE — 3700000001 HC GENERAL ANESTHESIA TIME - INITIAL BASE CHARGE: Performed by: STUDENT IN AN ORGANIZED HEALTH CARE EDUCATION/TRAINING PROGRAM

## 2024-03-12 PROCEDURE — 2500000004 HC RX 250 GENERAL PHARMACY W/ HCPCS (ALT 636 FOR OP/ED): Performed by: STUDENT IN AN ORGANIZED HEALTH CARE EDUCATION/TRAINING PROGRAM

## 2024-03-12 PROCEDURE — 73560 X-RAY EXAM OF KNEE 1 OR 2: CPT | Mod: RT

## 2024-03-12 PROCEDURE — 97116 GAIT TRAINING THERAPY: CPT | Mod: GP

## 2024-03-12 PROCEDURE — 99100 ANES PT EXTEME AGE<1 YR&>70: CPT | Performed by: ANESTHESIOLOGY

## 2024-03-12 PROCEDURE — 3700000002 HC GENERAL ANESTHESIA TIME - EACH INCREMENTAL 1 MINUTE: Performed by: STUDENT IN AN ORGANIZED HEALTH CARE EDUCATION/TRAINING PROGRAM

## 2024-03-12 PROCEDURE — 64447 NJX AA&/STRD FEMORAL NRV IMG: CPT | Performed by: ANESTHESIOLOGY

## 2024-03-12 PROCEDURE — 2780000003 HC OR 278 NO HCPCS: Performed by: STUDENT IN AN ORGANIZED HEALTH CARE EDUCATION/TRAINING PROGRAM

## 2024-03-12 PROCEDURE — A27447 PR TOTAL KNEE ARTHROPLASTY: Performed by: ANESTHESIOLOGIST ASSISTANT

## 2024-03-12 PROCEDURE — 3600000005 HC OR TIME - INITIAL BASE CHARGE - PROCEDURE LEVEL FIVE: Performed by: STUDENT IN AN ORGANIZED HEALTH CARE EDUCATION/TRAINING PROGRAM

## 2024-03-12 PROCEDURE — RXMED WILLOW AMBULATORY MEDICATION CHARGE

## 2024-03-12 PROCEDURE — A27447 PR TOTAL KNEE ARTHROPLASTY: Performed by: ANESTHESIOLOGY

## 2024-03-12 PROCEDURE — 7100000002 HC RECOVERY ROOM TIME - EACH INCREMENTAL 1 MINUTE: Performed by: STUDENT IN AN ORGANIZED HEALTH CARE EDUCATION/TRAINING PROGRAM

## 2024-03-12 PROCEDURE — 64999 UNLISTED PX NERVOUS SYSTEM: CPT | Performed by: ANESTHESIOLOGY

## 2024-03-12 PROCEDURE — 2500000004 HC RX 250 GENERAL PHARMACY W/ HCPCS (ALT 636 FOR OP/ED): Performed by: ANESTHESIOLOGIST ASSISTANT

## 2024-03-12 PROCEDURE — C1776 JOINT DEVICE (IMPLANTABLE): HCPCS | Performed by: STUDENT IN AN ORGANIZED HEALTH CARE EDUCATION/TRAINING PROGRAM

## 2024-03-12 PROCEDURE — 97530 THERAPEUTIC ACTIVITIES: CPT | Mod: GP

## 2024-03-12 PROCEDURE — 97110 THERAPEUTIC EXERCISES: CPT | Mod: GP

## 2024-03-12 PROCEDURE — 3600000010 HC OR TIME - EACH INCREMENTAL 1 MINUTE - PROCEDURE LEVEL FIVE: Performed by: STUDENT IN AN ORGANIZED HEALTH CARE EDUCATION/TRAINING PROGRAM

## 2024-03-12 PROCEDURE — 7100000001 HC RECOVERY ROOM TIME - INITIAL BASE CHARGE: Performed by: STUDENT IN AN ORGANIZED HEALTH CARE EDUCATION/TRAINING PROGRAM

## 2024-03-12 PROCEDURE — 36415 COLL VENOUS BLD VENIPUNCTURE: CPT | Performed by: STUDENT IN AN ORGANIZED HEALTH CARE EDUCATION/TRAINING PROGRAM

## 2024-03-12 PROCEDURE — 7100000010 HC PHASE TWO TIME - EACH INCREMENTAL 1 MINUTE: Performed by: STUDENT IN AN ORGANIZED HEALTH CARE EDUCATION/TRAINING PROGRAM

## 2024-03-12 PROCEDURE — 2500000005 HC RX 250 GENERAL PHARMACY W/O HCPCS: Performed by: STUDENT IN AN ORGANIZED HEALTH CARE EDUCATION/TRAINING PROGRAM

## 2024-03-12 DEVICE — TOBRA FULL DOSE ANTIBIOTIC BONE CEMENT, 10 PACK CATALOG NUMBER IS 6197-9-010
Type: IMPLANTABLE DEVICE | Site: KNEE | Status: FUNCTIONAL
Brand: SIMPLEX

## 2024-03-12 DEVICE — ATTUNE PATELLA MEDIALIZED DOME 38MM CEMENTED AOX
Type: IMPLANTABLE DEVICE | Site: KNEE | Status: FUNCTIONAL
Brand: ATTUNE

## 2024-03-12 DEVICE — ATTUNE KNEE SYSTEM TIBIAL BASE FIXED BEARING SIZE 6 CEMENTED
Type: IMPLANTABLE DEVICE | Site: KNEE | Status: FUNCTIONAL
Brand: ATTUNE

## 2024-03-12 DEVICE — ATTUNE KNEE SYSTEM FEMORAL CRUCIATE RETAINING SIZE 6 RIGHT CEMENTED
Type: IMPLANTABLE DEVICE | Site: KNEE | Status: FUNCTIONAL
Brand: ATTUNE

## 2024-03-12 RX ORDER — MEPERIDINE HYDROCHLORIDE 25 MG/ML
12.5 INJECTION INTRAMUSCULAR; INTRAVENOUS; SUBCUTANEOUS EVERY 10 MIN PRN
Status: DISCONTINUED | OUTPATIENT
Start: 2024-03-12 | End: 2024-03-12 | Stop reason: HOSPADM

## 2024-03-12 RX ORDER — CEFAZOLIN SODIUM 2 G/100ML
2 INJECTION, SOLUTION INTRAVENOUS ONCE
Status: COMPLETED | OUTPATIENT
Start: 2024-03-12 | End: 2024-03-12

## 2024-03-12 RX ORDER — METHYLPREDNISOLONE ACETATE 40 MG/ML
INJECTION, SUSPENSION INTRA-ARTICULAR; INTRALESIONAL; INTRAMUSCULAR; SOFT TISSUE AS NEEDED
Status: DISCONTINUED | OUTPATIENT
Start: 2024-03-12 | End: 2024-03-12 | Stop reason: HOSPADM

## 2024-03-12 RX ORDER — ACETAMINOPHEN 325 MG/1
650 TABLET ORAL EVERY 6 HOURS SCHEDULED
Status: DISCONTINUED | OUTPATIENT
Start: 2024-03-12 | End: 2024-03-12

## 2024-03-12 RX ORDER — POLYETHYLENE GLYCOL 3350 17 G/17G
17 POWDER, FOR SOLUTION ORAL DAILY
Status: DISCONTINUED | OUTPATIENT
Start: 2024-03-12 | End: 2024-03-12 | Stop reason: HOSPADM

## 2024-03-12 RX ORDER — DEXAMETHASONE SODIUM PHOSPHATE 10 MG/ML
INJECTION INTRAMUSCULAR; INTRAVENOUS AS NEEDED
Status: DISCONTINUED | OUTPATIENT
Start: 2024-03-12 | End: 2024-03-12

## 2024-03-12 RX ORDER — LABETALOL HYDROCHLORIDE 5 MG/ML
5 INJECTION, SOLUTION INTRAVENOUS ONCE AS NEEDED
Status: DISCONTINUED | OUTPATIENT
Start: 2024-03-12 | End: 2024-03-12 | Stop reason: HOSPADM

## 2024-03-12 RX ORDER — ONDANSETRON 4 MG/1
4 TABLET, ORALLY DISINTEGRATING ORAL EVERY 8 HOURS PRN
Status: DISCONTINUED | OUTPATIENT
Start: 2024-03-12 | End: 2024-03-12 | Stop reason: HOSPADM

## 2024-03-12 RX ORDER — OXYCODONE HYDROCHLORIDE 5 MG/1
5 TABLET ORAL EVERY 6 HOURS PRN
Status: DISCONTINUED | OUTPATIENT
Start: 2024-03-12 | End: 2024-03-12 | Stop reason: HOSPADM

## 2024-03-12 RX ORDER — BUPIVACAINE HYDROCHLORIDE 7.5 MG/ML
INJECTION, SOLUTION INTRASPINAL AS NEEDED
Status: DISCONTINUED | OUTPATIENT
Start: 2024-03-12 | End: 2024-03-12

## 2024-03-12 RX ORDER — LIDOCAINE HYDROCHLORIDE 10 MG/ML
INJECTION, SOLUTION EPIDURAL; INFILTRATION; INTRACAUDAL; PERINEURAL AS NEEDED
Status: DISCONTINUED | OUTPATIENT
Start: 2024-03-12 | End: 2024-03-12

## 2024-03-12 RX ORDER — CEFAZOLIN SODIUM 2 G/100ML
2 INJECTION, SOLUTION INTRAVENOUS EVERY 8 HOURS
Status: DISCONTINUED | OUTPATIENT
Start: 2024-03-12 | End: 2024-03-12 | Stop reason: HOSPADM

## 2024-03-12 RX ORDER — ONDANSETRON HYDROCHLORIDE 2 MG/ML
4 INJECTION, SOLUTION INTRAVENOUS EVERY 8 HOURS PRN
Status: DISCONTINUED | OUTPATIENT
Start: 2024-03-12 | End: 2024-03-12 | Stop reason: HOSPADM

## 2024-03-12 RX ORDER — HYDRALAZINE HYDROCHLORIDE 20 MG/ML
5 INJECTION INTRAMUSCULAR; INTRAVENOUS EVERY 30 MIN PRN
Status: DISCONTINUED | OUTPATIENT
Start: 2024-03-12 | End: 2024-03-12 | Stop reason: HOSPADM

## 2024-03-12 RX ORDER — ASPIRIN 81 MG/1
81 TABLET ORAL 2 TIMES DAILY
Status: DISCONTINUED | OUTPATIENT
Start: 2024-03-12 | End: 2024-03-12 | Stop reason: HOSPADM

## 2024-03-12 RX ORDER — SODIUM CHLORIDE 9 MG/ML
22 INJECTION INTRAMUSCULAR; INTRAVENOUS; SUBCUTANEOUS ONCE
Status: DISCONTINUED | OUTPATIENT
Start: 2024-03-12 | End: 2024-03-12 | Stop reason: HOSPADM

## 2024-03-12 RX ORDER — FENTANYL CITRATE 50 UG/ML
50 INJECTION, SOLUTION INTRAMUSCULAR; INTRAVENOUS ONCE
Status: COMPLETED | OUTPATIENT
Start: 2024-03-12 | End: 2024-03-12

## 2024-03-12 RX ORDER — PROPOFOL 10 MG/ML
INJECTION, EMULSION INTRAVENOUS AS NEEDED
Status: DISCONTINUED | OUTPATIENT
Start: 2024-03-12 | End: 2024-03-12

## 2024-03-12 RX ORDER — MIDAZOLAM HYDROCHLORIDE 1 MG/ML
2 INJECTION, SOLUTION INTRAMUSCULAR; INTRAVENOUS ONCE
Status: COMPLETED | OUTPATIENT
Start: 2024-03-12 | End: 2024-03-12

## 2024-03-12 RX ORDER — SODIUM CHLORIDE, SODIUM LACTATE, POTASSIUM CHLORIDE, CALCIUM CHLORIDE 600; 310; 30; 20 MG/100ML; MG/100ML; MG/100ML; MG/100ML
100 INJECTION, SOLUTION INTRAVENOUS CONTINUOUS
Status: DISCONTINUED | OUTPATIENT
Start: 2024-03-12 | End: 2024-03-12 | Stop reason: HOSPADM

## 2024-03-12 RX ORDER — PANTOPRAZOLE SODIUM 40 MG/1
40 TABLET, DELAYED RELEASE ORAL
Status: DISCONTINUED | OUTPATIENT
Start: 2024-03-13 | End: 2024-03-12 | Stop reason: HOSPADM

## 2024-03-12 RX ORDER — BUPIVACAINE HCL/EPINEPHRINE 0.5-1:200K
VIAL (ML) INJECTION AS NEEDED
Status: DISCONTINUED | OUTPATIENT
Start: 2024-03-12 | End: 2024-03-12 | Stop reason: HOSPADM

## 2024-03-12 RX ORDER — CEFAZOLIN 1 G/1
500 INJECTION, POWDER, FOR SOLUTION INTRAVENOUS ONCE
Status: COMPLETED | OUTPATIENT
Start: 2024-03-12 | End: 2024-03-12

## 2024-03-12 RX ORDER — BUPIVACAINE HYDROCHLORIDE 5 MG/ML
INJECTION, SOLUTION PERINEURAL AS NEEDED
Status: DISCONTINUED | OUTPATIENT
Start: 2024-03-12 | End: 2024-03-12 | Stop reason: HOSPADM

## 2024-03-12 RX ORDER — PHENYLEPHRINE HCL IN 0.9% NACL 1 MG/10 ML
SYRINGE (ML) INTRAVENOUS AS NEEDED
Status: DISCONTINUED | OUTPATIENT
Start: 2024-03-12 | End: 2024-03-12

## 2024-03-12 RX ORDER — TRANEXAMIC ACID 100 MG/ML
INJECTION, SOLUTION INTRAVENOUS AS NEEDED
Status: DISCONTINUED | OUTPATIENT
Start: 2024-03-12 | End: 2024-03-12

## 2024-03-12 RX ORDER — SCOLOPAMINE TRANSDERMAL SYSTEM 1 MG/1
1 PATCH, EXTENDED RELEASE TRANSDERMAL ONCE
Status: DISCONTINUED | OUTPATIENT
Start: 2024-03-12 | End: 2024-03-12 | Stop reason: HOSPADM

## 2024-03-12 RX ORDER — FENTANYL CITRATE 50 UG/ML
50 INJECTION, SOLUTION INTRAMUSCULAR; INTRAVENOUS EVERY 5 MIN PRN
Status: DISCONTINUED | OUTPATIENT
Start: 2024-03-12 | End: 2024-03-12 | Stop reason: HOSPADM

## 2024-03-12 RX ORDER — IBUPROFEN 600 MG/1
600 TABLET ORAL 3 TIMES DAILY
Status: DISCONTINUED | OUTPATIENT
Start: 2024-03-13 | End: 2024-03-12 | Stop reason: HOSPADM

## 2024-03-12 RX ORDER — SODIUM CHLORIDE, SODIUM LACTATE, POTASSIUM CHLORIDE, CALCIUM CHLORIDE 600; 310; 30; 20 MG/100ML; MG/100ML; MG/100ML; MG/100ML
50 INJECTION, SOLUTION INTRAVENOUS CONTINUOUS
Status: DISCONTINUED | OUTPATIENT
Start: 2024-03-12 | End: 2024-03-12 | Stop reason: HOSPADM

## 2024-03-12 RX ORDER — OXYCODONE HYDROCHLORIDE 5 MG/1
2.5 TABLET ORAL EVERY 4 HOURS PRN
Status: DISCONTINUED | OUTPATIENT
Start: 2024-03-12 | End: 2024-03-12 | Stop reason: HOSPADM

## 2024-03-12 RX ORDER — ADHESIVE BANDAGE
30 BANDAGE TOPICAL DAILY PRN
Status: DISCONTINUED | OUTPATIENT
Start: 2024-03-12 | End: 2024-03-12 | Stop reason: HOSPADM

## 2024-03-12 RX ORDER — BUPIVACAINE HCL/EPINEPHRINE 0.5-1:200K
30 VIAL (ML) INJECTION ONCE
Status: DISCONTINUED | OUTPATIENT
Start: 2024-03-12 | End: 2024-03-12 | Stop reason: HOSPADM

## 2024-03-12 RX ORDER — ACETAMINOPHEN 325 MG/1
650 TABLET ORAL EVERY 6 HOURS SCHEDULED
Status: DISCONTINUED | OUTPATIENT
Start: 2024-03-12 | End: 2024-03-12 | Stop reason: HOSPADM

## 2024-03-12 RX ORDER — KETOROLAC TROMETHAMINE 15 MG/ML
15 INJECTION, SOLUTION INTRAMUSCULAR; INTRAVENOUS EVERY 6 HOURS
Status: DISCONTINUED | OUTPATIENT
Start: 2024-03-12 | End: 2024-03-12 | Stop reason: HOSPADM

## 2024-03-12 RX ORDER — BUPIVACAINE HYDROCHLORIDE 5 MG/ML
20 INJECTION, SOLUTION PERINEURAL ONCE
Status: DISCONTINUED | OUTPATIENT
Start: 2024-03-12 | End: 2024-03-12 | Stop reason: HOSPADM

## 2024-03-12 RX ORDER — ALBUTEROL SULFATE 0.83 MG/ML
2.5 SOLUTION RESPIRATORY (INHALATION) ONCE AS NEEDED
Status: DISCONTINUED | OUTPATIENT
Start: 2024-03-12 | End: 2024-03-12 | Stop reason: HOSPADM

## 2024-03-12 RX ORDER — OXYCODONE HYDROCHLORIDE 5 MG/1
10 TABLET ORAL EVERY 4 HOURS PRN
Status: DISCONTINUED | OUTPATIENT
Start: 2024-03-12 | End: 2024-03-12 | Stop reason: HOSPADM

## 2024-03-12 RX ORDER — FENTANYL CITRATE 50 UG/ML
INJECTION, SOLUTION INTRAMUSCULAR; INTRAVENOUS AS NEEDED
Status: DISCONTINUED | OUTPATIENT
Start: 2024-03-12 | End: 2024-03-12

## 2024-03-12 RX ORDER — ONDANSETRON HYDROCHLORIDE 2 MG/ML
4 INJECTION, SOLUTION INTRAVENOUS ONCE AS NEEDED
Status: COMPLETED | OUTPATIENT
Start: 2024-03-12 | End: 2024-03-12

## 2024-03-12 RX ORDER — BUPIVACAINE HYDROCHLORIDE 2.5 MG/ML
INJECTION, SOLUTION INFILTRATION; PERINEURAL AS NEEDED
Status: DISCONTINUED | OUTPATIENT
Start: 2024-03-12 | End: 2024-03-12

## 2024-03-12 RX ORDER — SODIUM CHLORIDE 9 MG/ML
20 INJECTION INTRAMUSCULAR; INTRAVENOUS; SUBCUTANEOUS ONCE
Status: DISCONTINUED | OUTPATIENT
Start: 2024-03-12 | End: 2024-03-12 | Stop reason: HOSPADM

## 2024-03-12 RX ORDER — FENTANYL CITRATE 50 UG/ML
12.5 INJECTION, SOLUTION INTRAMUSCULAR; INTRAVENOUS EVERY 5 MIN PRN
Status: DISCONTINUED | OUTPATIENT
Start: 2024-03-12 | End: 2024-03-12 | Stop reason: HOSPADM

## 2024-03-12 RX ORDER — NALOXONE HYDROCHLORIDE 0.4 MG/ML
0.2 INJECTION, SOLUTION INTRAMUSCULAR; INTRAVENOUS; SUBCUTANEOUS EVERY 5 MIN PRN
Status: DISCONTINUED | OUTPATIENT
Start: 2024-03-12 | End: 2024-03-12 | Stop reason: HOSPADM

## 2024-03-12 RX ADMIN — SCOPALAMINE 1 PATCH: 1 PATCH, EXTENDED RELEASE TRANSDERMAL at 14:09

## 2024-03-12 RX ADMIN — Medication 100 MCG: at 08:52

## 2024-03-12 RX ADMIN — DEXAMETHASONE SODIUM PHOSPHATE 10 MG: 10 INJECTION, SOLUTION INTRAMUSCULAR; INTRAVENOUS at 07:28

## 2024-03-12 RX ADMIN — FENTANYL CITRATE 50 MCG: 50 INJECTION INTRAMUSCULAR; INTRAVENOUS at 07:26

## 2024-03-12 RX ADMIN — FENTANYL CITRATE 50 MCG: 50 INJECTION INTRAMUSCULAR; INTRAVENOUS at 09:34

## 2024-03-12 RX ADMIN — PROPOFOL 50 MG: 10 INJECTION, EMULSION INTRAVENOUS at 07:58

## 2024-03-12 RX ADMIN — HYDROMORPHONE HYDROCHLORIDE 0.2 MG: 0.2 INJECTION, SOLUTION INTRAMUSCULAR; INTRAVENOUS; SUBCUTANEOUS at 10:35

## 2024-03-12 RX ADMIN — Medication 100 MCG: at 08:29

## 2024-03-12 RX ADMIN — MIDAZOLAM 2 MG: 1 INJECTION INTRAMUSCULAR; INTRAVENOUS at 07:25

## 2024-03-12 RX ADMIN — LIDOCAINE HYDROCHLORIDE 2.5 ML: 10 INJECTION, SOLUTION EPIDURAL; INFILTRATION; INTRACAUDAL; PERINEURAL at 09:34

## 2024-03-12 RX ADMIN — TRANEXAMIC ACID 1000 MG: 1 INJECTION, SOLUTION INTRAVENOUS at 07:58

## 2024-03-12 RX ADMIN — PROPOFOL 50 MG: 10 INJECTION, EMULSION INTRAVENOUS at 08:20

## 2024-03-12 RX ADMIN — ACETAMINOPHEN 650 MG: 325 TABLET ORAL at 14:07

## 2024-03-12 RX ADMIN — TRANEXAMIC ACID 1000 MG: 1 INJECTION, SOLUTION INTRAVENOUS at 09:31

## 2024-03-12 RX ADMIN — SODIUM CHLORIDE, SODIUM LACTATE, POTASSIUM CHLORIDE, AND CALCIUM CHLORIDE 100 ML/HR: 600; 310; 30; 20 INJECTION, SOLUTION INTRAVENOUS at 05:54

## 2024-03-12 RX ADMIN — PROPOFOL 500 MG: 10 INJECTION, EMULSION INTRAVENOUS at 07:57

## 2024-03-12 RX ADMIN — Medication 100 MCG: at 08:36

## 2024-03-12 RX ADMIN — KETOROLAC TROMETHAMINE 15 MG: 15 INJECTION, SOLUTION INTRAMUSCULAR; INTRAVENOUS at 14:10

## 2024-03-12 RX ADMIN — Medication 100 MCG: at 09:07

## 2024-03-12 RX ADMIN — PROPOFOL 400 MG: 10 INJECTION, EMULSION INTRAVENOUS at 09:14

## 2024-03-12 RX ADMIN — ONDANSETRON 4 MG: 2 INJECTION INTRAMUSCULAR; INTRAVENOUS at 11:15

## 2024-03-12 RX ADMIN — BUPIVACAINE HYDROCHLORIDE 10 ML: 2.5 INJECTION, SOLUTION INFILTRATION; PERINEURAL at 07:32

## 2024-03-12 RX ADMIN — CEFAZOLIN SODIUM 2 G: 2 INJECTION, SOLUTION INTRAVENOUS at 07:56

## 2024-03-12 RX ADMIN — BUPIVACAINE HYDROCHLORIDE IN DEXTROSE 1 ML: 7.5 INJECTION, SOLUTION SUBARACHNOID at 07:56

## 2024-03-12 RX ADMIN — LIDOCAINE HYDROCHLORIDE 2.5 ML: 10 INJECTION, SOLUTION EPIDURAL; INFILTRATION; INTRACAUDAL; PERINEURAL at 07:58

## 2024-03-12 RX ADMIN — PROPOFOL 100 MG: 10 INJECTION, EMULSION INTRAVENOUS at 08:49

## 2024-03-12 RX ADMIN — FENTANYL CITRATE 50 MCG: 50 INJECTION INTRAMUSCULAR; INTRAVENOUS at 08:19

## 2024-03-12 RX ADMIN — FENTANYL CITRATE 12.5 MCG: 50 INJECTION INTRAMUSCULAR; INTRAVENOUS at 11:14

## 2024-03-12 RX ADMIN — BUPIVACAINE HYDROCHLORIDE 25 ML: 5 INJECTION, SOLUTION PERINEURAL at 07:28

## 2024-03-12 SDOH — HEALTH STABILITY: MENTAL HEALTH: CURRENT SMOKER: 0

## 2024-03-12 ASSESSMENT — PAIN - FUNCTIONAL ASSESSMENT
PAIN_FUNCTIONAL_ASSESSMENT: 0-10
PAIN_FUNCTIONAL_ASSESSMENT: WONG-BAKER FACES
PAIN_FUNCTIONAL_ASSESSMENT: 0-10

## 2024-03-12 ASSESSMENT — COLUMBIA-SUICIDE SEVERITY RATING SCALE - C-SSRS
6. HAVE YOU EVER DONE ANYTHING, STARTED TO DO ANYTHING, OR PREPARED TO DO ANYTHING TO END YOUR LIFE?: NO
1. IN THE PAST MONTH, HAVE YOU WISHED YOU WERE DEAD OR WISHED YOU COULD GO TO SLEEP AND NOT WAKE UP?: NO
2. HAVE YOU ACTUALLY HAD ANY THOUGHTS OF KILLING YOURSELF?: NO

## 2024-03-12 ASSESSMENT — PAIN SCALES - GENERAL
PAINLEVEL_OUTOF10: 2
PAINLEVEL_OUTOF10: 5 - MODERATE PAIN
PAINLEVEL_OUTOF10: 3
PAINLEVEL_OUTOF10: 0 - NO PAIN
PAINLEVEL_OUTOF10: 3
PAINLEVEL_OUTOF10: 2
PAINLEVEL_OUTOF10: 1
PAIN_LEVEL: 2
PAINLEVEL_OUTOF10: 0 - NO PAIN
PAINLEVEL_OUTOF10: 0 - NO PAIN
PAINLEVEL_OUTOF10: 2
PAINLEVEL_OUTOF10: 1
PAINLEVEL_OUTOF10: 4
PAINLEVEL_OUTOF10: 1

## 2024-03-12 ASSESSMENT — COGNITIVE AND FUNCTIONAL STATUS - GENERAL
STANDING UP FROM CHAIR USING ARMS: A LITTLE
MOBILITY SCORE: 19
MOVING TO AND FROM BED TO CHAIR: A LITTLE
WALKING IN HOSPITAL ROOM: A LITTLE
TURNING FROM BACK TO SIDE WHILE IN FLAT BAD: A LITTLE
CLIMB 3 TO 5 STEPS WITH RAILING: A LITTLE

## 2024-03-12 ASSESSMENT — ACTIVITIES OF DAILY LIVING (ADL)
ADLS_ADDRESSED: YES
LACK_OF_TRANSPORTATION: NO

## 2024-03-12 NOTE — PROGRESS NOTES
Physical Therapy    Physical Therapy Evaluation & Treatment    Patient Name: Kun Castle Jr  MRN: [id number]  Today's Date: 3/12/2024   Time Calculation  Start Time: 1420  Stop Time: 1522  Time Calculation (min): 62 min    Assessment/Plan   PT Assessment  PT Assessment Results: Decreased strength, Decreased endurance, Decreased range of motion, Impaired balance, Decreased mobility, Orthopedic restrictions, Pain  Rehab Prognosis: Excellent  Evaluation/Treatment Tolerance: Patient tolerated treatment well  End of Session Communication: Bedside nurse  Assessment Comment: Pt is a 79 y/o male s/p R TKA participating at high functional level with no hands-on assistance other than lower body dressing this date. Pt has spouse and sister assistance available upon discharge and is very motivated to progress towards functional goals to improve strength, ROM, and functional mobility.  End of Session Patient Position: Up in chair   IP OR SWING BED PT PLAN  Inpatient or Swing Bed: Inpatient  PT Plan  Treatment/Interventions: Bed mobility, Transfer training, Gait training, Stair training, Balance training, Neuromuscular re-education, Strengthening, Endurance training, Range of motion, Therapeutic exercise, Therapeutic activity, Home exercise program  PT Plan: Skilled PT  PT Frequency: BID  PT Discharge Recommendations: Low intensity level of continued care  Equipment Recommended upon Discharge: Wheeled walker  PT Recommended Transfer Status: Assist x1  PT - OK to Discharge: Yes    Subjective     General Visit Information:  General  Reason for Referral: Pt is s/p R TKA resurfacing by Dr. Ornelas.  Past Medical History Relevant to Rehab: RA, HTN, OA bilateral knees, sciatica, GERD, hernia R inguinal  Missed Visit: No  Family/Caregiver Present: Yes  Prior to Session Communication: Bedside nurse  Patient Position Received: Bed, 3 rail up  Home Living:  Home Living  Type of Home: House  Lives With: Spouse  Home Adaptive Equipment:  Walker rolling or standard, Cane  Home Layout: Two level, 1/2 bath on main level, Bed/bath upstairs, Stairs to alternate level with rails  Alternate Level Stairs-Rails: Right  Alternate Level Stairs-Number of Steps: 13  Home Access: Level entry  Prior Level of Function:  Prior Function Per Pt/Caregiver Report  Level of [place]: Independent with ADLs and functional transfers, Independent with homemaking with ambulation  Precautions:  Precautions  LE Weight Bearing Status: Weight Bearing as Tolerated  Medical Precautions: Fall precautions (Low)  Post-Surgical Precautions: Right total knee precautions    Objective   Pain:  Pain Assessment  Pain Assessment: 0-10  Pain Score: 1  Pain Type: Surgical pain  Pain Location: Knee  Pain Orientation: Right  Patient's Stated Pain Goal: No pain  Pain Interventions: Repositioned, Ambulation/increased activity  Response to Interventions: no pain reported  Cognition:  Cognition  Overall Cognitive Status: Within Functional Limits    General Assessments:  Activity Tolerance  Endurance: Tolerates 30 min exercise with multiple rests    Sensation  Light Touch: No apparent deficits    Static Sitting Balance  Static Sitting-Balance Support: No upper extremity supported, Feet supported  Dynamic Sitting Balance  Dynamic Sitting-Balance Support: No upper extremity supported, Feet supported    Static Standing Balance  Static Standing-Balance Support: Bilateral upper extremity supported  Dynamic Standing Balance  Dynamic Standing-Balance Support: Bilateral upper extremity supported  Functional Assessments:  ADL  ADL's Addressed: Yes  ADL Comments: Assist and verbal cues for lower body dressing provided at bedside, pt seated edge of bed    Bed Mobility  Bed Mobility: Yes  Bed Mobility 1  Bed Mobility 1: Supine to sitting  Level of Assistance 1: Close supervision  Bed Mobility Comments 1: x 1 trial, increased time to complete    Transfers  Transfer: Yes  Transfer 1  Transfer From 1: Bed  to  Transfer to 1: Stand  Technique 1: Sit to stand, Stand to sit  Transfer Device 1: Walker  Transfer Level of Assistance 1: Contact guard, Close supervision  Trials/Comments 1: x 3 trials, min verbal cues for hand placement  Transfers 2  Transfer From 2: Chair with arms to  Transfer to 2: Sit, Stand  Technique 2: Sit to stand, Stand to sit  Transfer Device 2: Walker  Transfer Level of Assistance 2: Close supervision  Trials/Comments 2: x 2 trials, min verbal cues for hand placement    Ambulation/Gait Training  Ambulation/Gait Training Performed: Yes  Ambulation/Gait Training 1  Surface 1: Level tile  Device 1: Rolling walker  Assistance 1: Close supervision  Quality of Gait 1: Inconsistent stride length, Decreased step length  Comments/Distance (ft) 1: 220 x 2 trials, min verbal cues for sequencing and RW management  Ambulation/Gait Training 2  Surface 2: Level tile  Device 2: Rolling walker  Assistance 2: Close supervision  Quality of Gait 2: Decreased step length, Inconsistent stride length  Comments/Distance (ft) 2: 35 ft x 2, improvement noted in kevin, step length and step through pattern    Stairs  Stairs: Yes  Stairs  Rails 1: Right  Curb Step 1: No  Device 1: Single point cane  Assistance 1: Close supervision, Contact guard  Comment/Number of Steps 1: 3 steps x 2 trials, decreased assistance needed 2nd trial, non-reciprocal pattern, min intermittent verbal cues for sequencing and SPC placement. No buckling nor safety concerns noted.  Extremity/Trunk Assessments:  RUE   RUE : Exceptions to WFL (decreased bilateral hand/wrist ROM due to RA)  RLE   RLE : Exceptions to WFL  AROM RLE (degrees)  R Knee Flexion 0-130: 90  R Knee Extension 0-130: 0  LLE   LLE : Within Functional Limits  Treatments:  Therapeutic Exercise  Therapeutic Exercise Performed: Yes  Therapeutic Exercise Activity 1: X 10 bilateral ankle pumps, x 10 R quad sets 3 sec holds, x 10 glute sets 3 sec holds, x 10 R heel slides, x 10 R SAQ, x 10  R  SLR    Therapeutic Activity  Therapeutic Activity Performed: Yes  Therapeutic Activity 1: Pt educated on precautions, WBS, positioning, importance of mobility, rationale of above in addition to HEP    Bed Mobility  Bed Mobility: Yes  Bed Mobility 1  Bed Mobility 1: Supine to sitting  Level of Assistance 1: Close supervision  Bed Mobility Comments 1: x 1 trial, increased time to complete    Ambulation/Gait Training  Ambulation/Gait Training Performed: Yes  Ambulation/Gait Training 1  Surface 1: Level tile  Device 1: Rolling walker  Assistance 1: Close supervision  Quality of Gait 1: Inconsistent stride length, Decreased step length  Comments/Distance (ft) 1: 220 x 2 trials, min verbal cues for sequencing and RW management  Ambulation/Gait Training 2  Surface 2: Level tile  Device 2: Rolling walker  Assistance 2: Close supervision  Quality of Gait 2: Decreased step length, Inconsistent stride length  Comments/Distance (ft) 2: 35 ft x 2, improvement noted in kevin, step length and step through pattern  Transfers  Transfer: Yes  Transfer 1  Transfer From 1: Bed to  Transfer to 1: Stand  Technique 1: Sit to stand, Stand to sit  Transfer Device 1: Walker  Transfer Level of Assistance 1: Contact guard, Close supervision  Trials/Comments 1: x 3 trials, min verbal cues for hand placement  Transfers 2  Transfer From 2: Chair with arms to  Transfer to 2: Sit, Stand  Technique 2: Sit to stand, Stand to sit  Transfer Device 2: Walker  Transfer Level of Assistance 2: Close supervision  Trials/Comments 2: x 2 trials, min verbal cues for hand placement    Stairs  Stairs: Yes  Stairs  Rails 1: Right  Curb Step 1: No  Device 1: Single point cane  Assistance 1: Close supervision, Contact guard  Comment/Number of Steps 1: 3 steps x 2 trials, decreased assistance needed 2nd trial, non-reciprocal pattern, min intermittent verbal cues for sequencing and SPC placement. No buckling nor safety concerns noted.  Outcome Measures:  [facility]  Basic Mobility  Turning from your back to your side while in a flat bed without using bedrails: None  Moving from lying on your back to sitting on the side of a flat bed without using bedrails: A little  Moving to and from bed to chair (including a wheelchair): A little  Standing up from a chair using your arms (e.g. wheelchair or bedside chair): A little  To walk in hospital room: A little  Climbing 3-5 steps with railing: A little  Basic Mobility - Total Score: 19    Encounter Problems       Encounter Problems (Active)       Balance       LTG - Patient will maintain balance to allow for safe mobility (Progressing)       Start:  03/12/24    Expected End:  03/14/24               Mobility       LTG - Patient will ambulate household distance 150 ft at Angie level (Progressing)       Start:  03/12/24    Expected End:  03/14/24            LTG - Patient will navigate 4-6 steps with rails/device at Angie level (Progressing)       Start:  03/12/24    Expected End:  03/14/24               PT Transfers       LTG - Patient will transfer from one surface to another at Angie level with RW (Progressing)       Start:  03/12/24    Expected End:  03/14/24               Pain          Safety       LTG - Patient will demonstrate safety requirements appropriate to situation/environment (Progressing)       Start:  03/12/24    Expected End:  03/14/24                   Education Documentation  Handouts, taught by Chelsea Morales, PT at 3/12/2024  3:42 PM.  Learner: Family, Patient  Readiness: Acceptance  Method: Explanation, Demonstration, Handout  Response: Verbalizes Understanding  Comment: Pt educated on precautions, positioning, importance of mobility, RW use with ambulation, spc + rail use with stair management, HEP with rationale, general safety with mobility    Precautions, taught by Chelsea Morales, PT at 3/12/2024  3:42 PM.  Learner: Family, Patient  Readiness: Acceptance  Method: Explanation, Demonstration, Handout  Response:  Verbalizes Understanding  Comment: Pt educated on precautions, positioning, importance of mobility, RW use with ambulation, spc + rail use with stair management, HEP with rationale, general safety with mobility    Home Exercise Program, taught by Chelsea Morales PT at 3/12/2024  3:42 PM.  Learner: Family, Patient  Readiness: Acceptance  Method: Explanation, Demonstration, Handout  Response: Verbalizes Understanding  Comment: Pt educated on precautions, positioning, importance of mobility, RW use with ambulation, spc + rail use with stair management, HEP with rationale, general safety with mobility    Mobility Training, taught by Chelsea Morales PT at 3/12/2024  3:42 PM.  Learner: Family, Patient  Readiness: Acceptance  Method: Explanation, Demonstration, Handout  Response: Verbalizes Understanding  Comment: Pt educated on precautions, positioning, importance of mobility, RW use with ambulation, spc + rail use with stair management, HEP with rationale, general safety with mobility    Education Comments  No comments found.

## 2024-03-12 NOTE — CARE PLAN
Problem: Balance  Goal: LTG - Patient will maintain balance to allow for safe mobility  Outcome: Progressing     Problem: Mobility  Goal: LTG - Patient will ambulate household distance 150 ft at Angie level  Outcome: Progressing  Goal: LTG - Patient will navigate 4-6 steps with rails/device at Angie level  Outcome: Progressing     Problem: Safety  Goal: LTG - Patient will demonstrate safety requirements appropriate to situation/environment  Outcome: Progressing     Problem: PT Transfers  Goal: LTG - Patient will transfer from one surface to another at Angie level with RW  Outcome: Progressing     Problem: Pain  Goal: LTG - Patient will demonstrate intervention for managing pain  Outcome: Progressing

## 2024-03-12 NOTE — ANESTHESIA PROCEDURE NOTES
Peripheral Block    Patient location during procedure: pre-op  Start time: 3/12/2024 7:32 AM  End time: 3/12/2024 7:33 AM  Reason for block: at surgeon's request and post-op pain management  Staffing  Performed: attending   Authorized by: Reid Sheikh MD    Performed by: Reid Sheikh MD  Preanesthetic Checklist  Completed: patient identified, IV checked, site marked, risks and benefits discussed, surgical consent, monitors and equipment checked, pre-op evaluation and timeout performed   Timeout performed at: 3/12/2024 7:25 AM  Peripheral Block  Patient position: laying flat  Prep: alcohol swabs, ChloraPrep and site prepped and draped  Patient monitoring: heart rate, cardiac monitor and continuous pulse ox  Block type: other  Laterality: right  Injection technique: single-shot  Guidance: nerve stimulator and ultrasound guided  Needle  Needle type: short-bevel   Needle gauge: 21 G  Needle length: 10 cm  Needle localization: anatomical landmarks and ultrasound guidance  Needle insertion depth: 8 cm  Assessment  Injection assessment: negative aspiration for heme, no paresthesia on injection, local visualized surrounding nerve on ultrasound and incremental injection  Paresthesia pain: none  Heart rate change: no  Slow fractionated injection: yes  Additional Notes  Curved transducer, posteromedial approach, IPACK

## 2024-03-12 NOTE — PERIOPERATIVE NURSING NOTE
Questions answered regarding discharge instructions.  Second copy given to patient per request as they will be at two homes.  Pharmacy at bedside with meds to beds.

## 2024-03-12 NOTE — PERIOPERATIVE NURSING NOTE
Pt voided another 100mL so has met requirements for urination for discharge.  Awaiting PT therapy.

## 2024-03-12 NOTE — ANESTHESIA POSTPROCEDURE EVALUATION
Patient: Kun Castle Jr    Procedure Summary       Date: 03/12/24 Room / Location: [facility] / [facility]    Anesthesia Start: 0745 Anesthesia Stop: 1015    Procedure: Arthroplasty Resurfacing Total Knee ( DEPUY ) [ Rapid Recovery ] (Right: Knee) Diagnosis:       Unilateral primary osteoarthritis, right knee      (Unilateral primary osteoarthritis, right knee [M17.11])    Surgeons: Katie Ornelas MD Responsible Provider: Reid Sheikh MD    Anesthesia Type: regional, spinal ASA Status: 2            Anesthesia Type: regional, spinal    Vitals Value Taken Time   /72 03/12/24 1131   Temp 36.6 °C (97.9 °F) 03/12/24 1131   Pulse 72 03/12/24 1131   Resp 16 03/12/24 1131   SpO2 92 % 03/12/24 1131       Anesthesia Post Evaluation    Patient location during evaluation: PACU  Patient participation: complete - patient participated  Level of consciousness: awake and alert  Pain score: 2  Pain management: satisfactory to patient  Airway patency: patent  Two or more strategies used to mitigate risk of obstructive sleep apnea  Cardiovascular status: hemodynamically stable  Respiratory status: acceptable  Hydration status: acceptable  Postoperative Nausea and Vomiting: none  Comments: PONV absent  Spinal regressing appropriately        There were no known notable events for this encounter.

## 2024-03-12 NOTE — HH CARE COORDINATION
Home Care received a Referral for Physical Therapy and Occupational Therapy. We have processed the referral for a Start of Care on 03/13.     If you have any questions or concerns, please feel free to contact us at [phone]. Follow the prompts, enter your five digit zip code, and you will be directed to your care team on [facility].

## 2024-03-12 NOTE — PERIOPERATIVE NURSING NOTE
Dp on right foot doppled with strong signal  fully recovered from spinal om arrival to pacu a/p and lateral films done    None

## 2024-03-12 NOTE — ANESTHESIA PROCEDURE NOTES
Spinal Block    Patient location during procedure: OR  Start time: 3/12/2024 7:50 AM  End time: 3/12/2024 7:56 AM  Reason for block: primary anesthetic and at surgeon's request  Staffing  Performed: attending   Authorized by: Reid Sheikh MD    Performed by: ALBARO Miranda    Preanesthetic Checklist  Completed: patient identified, IV checked, site marked, risks and benefits discussed, surgical consent, monitors and equipment checked, pre-op evaluation, timeout performed and sterile techniques followed  Block Timeout  RN/Licensed healthcare professional reads aloud to the Anesthesia provider and entire team: Patient identity, procedure with side and site, patient position, and as applicable the availability of implants/special equipment/special requirements.  Patient on coagulant treatment: no  Timeout performed at: 3/12/2024 7:49 AM  Spinal Block  Patient position: sitting  Prep: Betadine  Sterility prep: cap, drape, gloves, hand hygiene and mask  Sedation level: light sedation  Patient monitoring: heart rate, continuous pulse oximetry and blood pressure  Approach: midline  Vertebral space: L2-3  Injection technique: single-shot  Needle  Needle type: Tony   Needle gauge: 22 G  Needle length: 3.5 in (3.5 inches)  Free flowing CSF: yes    Assessment  Sensory level: T6  Block outcome: patient comfortable  Procedure assessment: patient sedated but conversant throughout procedure  Additional Notes  Patient tolerated procedure well with no immediate complications.      3 ml of local placed 1st (1% Lidocaine).    Medication dosage:   1 ml  of 0.75 % Bupivacaine with 8.25% Dextrose.    Lot #:  [id number]  Expiration date:  12/31/2025

## 2024-03-12 NOTE — BRIEF OP NOTE
Date: 3/12/2024  OR Location: [facility]    Name: Kun Castle Jr, : 1945, Age: 78 y.o., MRN: [id number], Sex: male    Diagnosis  Pre-op Diagnosis     * Unilateral primary osteoarthritis, right knee [M17.11] Post-op Diagnosis     * Unilateral primary osteoarthritis, right knee [M17.11]     Procedures  Arthroplasty Resurfacing Total Knee ( DEPUY ) [ Rapid Recovery ]  07009 - [place] ARTHRP KNE CONDYLE&PLATU MEDIAL&LAT COMPARTMENTS      Surgeons      * Katie Ornelas - Primary    Resident/Fellow/Other Assistant:  Surgeon(s) and Role:    Procedure Summary  Anesthesia: Consult  ASA: II  Anesthesia Staff: Anesthesiologist: Reid Sheikh MD  C-AA: ALBARO Miranda  Estimated Blood Loss: 30mL  Intra-op Medications:   Administrations occurring from 0700 to 1015 on 24:   Medication Name Total Dose   ceFAZolin (Ancef) injection 500 mg 500 mg   BUPivacaine-EPINEPHrine (Marcaine w/EPI) 0.5 %-1:200,000 injection 30 mL   methylPREDNISolone acetate (DEPO-Medrol) injection 40 mg   BUPivacaine HCl (Marcaine) 0.5 % (5 mg/mL) injection 45 mL   lactated Ringer's infusion Cannot be calculated   ceFAZolin in dextrose (iso-os) (Ancef) IVPB 2 g 2 g   fentaNYL PF (Sublimaze) injection 50 mcg 50 mcg   midazolam (Versed) injection 2 mg 2 mg              Anesthesia Record               Intraprocedure I/O Totals          Intake    Tranexamic Acid 0.00 mL    The total shown is the total volume documented since Anesthesia Start was filed.    ceFAZolin in dextrose (iso-os) (Ancef) IVPB 2 g 100.00 mL    Total Intake 100 mL          Specimen: No specimens collected     Staff:   Circulator: Felipa Che RN  Scrub Person: Richard Nielson; Lizabeth Portillo; Sabina Jc          Findings: End-stage stage arthritis. Varus deformity. Large posterior femoral and tibial osteophytes. Eburnation of tibia.      Complications:  None; patient tolerated the procedure well.     Disposition: PACU - hemodynamically stable.  Condition: stable  Specimens  Collected: No specimens collected  Attending Attestation: I was present and scrubbed for the entire procedure.    Katie Ornelas  Phone Number: [phone]

## 2024-03-12 NOTE — ANESTHESIA PREPROCEDURE EVALUATION
Patient: Kun Castle Jr    Procedure Information       Date/Time: 03/12/24 0700    Procedure: Arthroplasty Resurfacing Total Knee ( DEPUY ) [ Rapid Recovery ] (Right: Knee)    Location: [facility] / [facility]    Surgeons: Katie Ornelas MD            Relevant Problems   Cardiovascular   (+) Hyperlipidemia   (+) Hypertension      Pulmonary   (+) Asthma due to seasonal allergies      Musculoskeletal   (+) Rheumatoid arthritis (CMS/HCC)   (+) Unilateral primary osteoarthritis, right knee      Other   (+) Arthritis   (+) Arthritis of both knees   (+) Rheumatoid arthritis (CMS/HCC)     Past Surgical History:   Procedure Laterality Date    CATARACT EXTRACTION, BILATERAL      CHOLECYSTECTOMY      COLONOSCOPY  05/14/2014    Colonoscopy (Fiberoptic)    HERNIA REPAIR      TENDON REPAIR Left     left hand    TONSILLECTOMY         Clinical information reviewed:   Tobacco  Allergies  Meds   Med Hx  Surg Hx   Fam Hx  Soc Hx        NPO Detail:  NPO/Void Status  Date of Last Liquid: 03/11/24  Time of Last Liquid: 2300  Date of Last Solid: 03/11/24  Time of Last Solid: 1700  Time of Last Void: 0400         Physical Exam    Airway  Mallampati: III  TM distance: >3 FB  Neck ROM: full     Cardiovascular   Comments: deferred   Dental    Pulmonary   Comments: deferred   Abdominal     Comments: deferred           Anesthesia Plan    History of general anesthesia?: yes  History of complications of general anesthesia?: no    ASA 2     regional and spinal   (ACB plus IPACK)  The patient is not a current smoker.    intravenous induction   Postoperative administration of opioids is intended.  Anesthetic plan and risks discussed with patient.    Plan discussed with CAA.

## 2024-03-12 NOTE — OP NOTE
Operative Note      Kun Castle Jr      3/12/2024      PREOPERATIVE DIAGNOSIS: Right Knee Osteoarthritis      POSTOPERATIVE DIAGNOSIS: Same       Procedure(s):  Arthroplasty Resurfacing Total Knee ( DEPUY ) [ Rapid Recovery ] - Wound Class: Class I/ Clean - Incision Closure: Deep and Superficial Layers      Surgeon:   Katie Ornelas MD       First Assist:   DEAN Jc      Anesthesia Staff:   Anesthesiologist: Reid Sheikh MD  C-AA: ALBARO Miranda      Anesthesia Type: Consult       Specimens:   No specimens collected      Estimated Blood Loss:   Minimal      Implants:   Implant Name Type Inv. Item Serial No.  Lot No. LRB No. Used Action   DOME, PATELLA, MEDIALIZED, 38MM - [id number] Joint Knee DOME, PATELLA, MEDIALIZED, 38MM  DEPUY [id number] Right 1 Implanted   FEMORAL, ATTUNE CR, CARMELO, SZ 6, RT - [id number] Joint Knee FEMORAL, ATTUNE CR, CARMELO, SZ 6, RT  DEPUY [id number] Right 1 Implanted   TIBAL BASE ATTUNE FB, SZ 6 CARMELO - [id number] Joint Knee TIBAL BASE ATTUNE FB, SZ 6 CARMELO  DEPUY [id number] Right 1 Implanted   SIMPLEX P ANTIBIOTIC BONE CEMENT W/TOBRAMYCIN (EA)  MFR REPORTS NO LONGER SOLD AS EACH, PLEASE TRANSITION TO CAT#6197-9-010 (PK=10)    [employer] [id number] Right 1 Implanted   SIMPLEX P ANTIBIOTIC BONE CEMENT W/TOBRAMYCIN (EA)  MFR REPORTS NO LONGER SOLD AS EACH, PLEASE TRANSITION TO CAT#[id number] (PK=10)    [employer] [id number] Right 1 Implanted   5.0MM ATTUNE CRUCIATE RETAINING FIXED BEARING TIBIAL INSERT, AOX, SIZE 6, RIGHT, MEDIAL STABILIZED     [id number] Right 1 Implanted         Complications: None      Findings: End-stage stage arthritis. Varus deformity. Large posterior femoral and tibial osteophytes. Eburnation of tibia.      Clinical History:   The patient presents with severe osteoarthritis of the knee.  The patient has failed conservative management including activity modification, anti-inflammatory medications, and injections. All options were  discussed in detail with the patient and the patient has decided that they would like to proceed with total knee replacement after informed consent was obtained in the office and on the day of surgery.      Description of procedure:   Informed consent was obtained. The operative site was marked. The patient was transferred to the operating room. An epidural anesthetic and saphenous nerve block were performed. The patient received preoperative antibiotics and tranexamic acid.       The operative leg was scrubbed and prepped and draped in standard sterile fashion.  A surgical time out was performed confirming the operative site. An Esmarch bandage was used to exsanguinate the leg and the tourniquet was inflated to 250 mmHg.       A longitudinal incision approximately 15 cm in length was made centered over the patella. Soft tissue was dissected sharply down to the extensor mechanism. A medial parapatellar arthrotomy was performed to expose the knee joint. Soft tissue was elevated off the proximal medial tibia for exposure. Fat pad was excised. The patella was dislocated laterally.       Attention was first turned to the femur. The femoral canal was opened with a drill and an intramedullary elizabeth was passed. A 3-degree valgus distal femoral cut relative to the anatomic axis was made without difficulty.     Attention was then turned to the tibia. The extramedullary guide was placed around the tibia. The tibial cutting block was then pinned for a 2-3 degree varus relative to mechanical axis cut with approximately 5 degrees of posterior slope. The block was then pinned to remove 2 mm of bone from the worn medial side. The tibial cut was made without difficulty.      The extension gap was then checked. I was happy with medial lateral balance and that I could bring the knee out into full extension with a 5 mm spacer in place. Attention was turned back to the femur. The gap  guide was placed and expanded by 5 mm to  match extension gap. The sizer was pinned in place looking at rotation based on posterior condyles, trans-epicondylar axis and AP axis. The femur was sized and pins were drilled for the AP cutting block in 3-4 degrees of external rotation based on the epicondylar axis and vikas's. The AP cutting block was pinned in place and the anterior cut as well as the posterior cut and chamfer cuts were made without difficulty. The AP cutting block was removed and all bone fragments were removed.      A laminar  was placed on the lateral side of the knee. The remaining medial meniscus as well as osteophytes from the posterior aspect of the medial femoral condyle were removed. A second laminar  was placed on the medial side of the knee and remaining lateral meniscus as well as osteophytes from the posterior aspect of the lateral femoral condyle were excised. The PCL was preserved. The flexion gap balance was tested at this point. There was equal balance between flexion and extension as well as medial and lateral. The femoral sulcus cut was performed.      Attention was turned back to the tibia. The tibia was sized and a tibial trial was pinned in appropriate rotation. A trial femur was put in place. A cruciate retaining trial polyethylene component was placed, and the knee was taken through range of motion. The knee was stable through an arc of motion and patellar tracking was midline. The trial polyethylene component was removed. Osteophytes in the femoral notch were removed with an osteotome. The tibial keel punch was performed. A medial downsizing osteotomy was performed using an osteotome with tibial tray in place.       Attention was then turned to the patella. A freehand patellar cut was made without difficulty. Lug holes were drilled for the patella and a trial patella was put in place. The knee was once again taken through range of motion and the knee was stable with excellent patellar tracking  through range of motion.      All trial implants were removed. The posterior capsule was cauterized to minimize postoperative bleeding. A periarticular injection of Marcaine with epinephrine was injected into the periarticular soft tissues. Bone ends were irrigated and dried. A femoral bone plug was placed.       Implants were cemented into place. A trial 5 mm polyethylene liner was put in place to compress implants in extension. The tourniquet was deflated at 64 minutes. Hemostasis was obtained with electrocautery. All excess cement was removed. The cement was allowed to harden. The knee was then taken through a range of motion and I was happy with the balance on the medial and lateral side of the knee through an arc of motion as well as the kinematic tracking.       The trial polyethylene was removed. The locking mechanism was irrigated removing all debris. Excess cement was removed. A final 5 mm medial stabilized polyethylene was put in place and locked into place. The locking mechanism was tested and was intact. The knee was taken through final range of motion to test for stability and patellar tracking. The PCL was recessed slightly from its femoral insertion to optimize femoral rollback. The knee was irrigated with dilute betadine solution and saline pulsatile lavage.      At this point we began our closure. Several #2 ethibonds followed by Zero Vicryls and then #1 stratafix were used for closure of the arthrotomy. Subcutaneous tissue was closed in layers with 0 Vicryl followed by 3-0 Vicryl and skin was closed with 3-0 stratafix and Dermabond. Sterile dressings including Mepilex and Zaheer Collado Dressing were applied. At the end of the case all needle and instrument counts were correct. There were no complications. The patient was transferred to the bed and then recovery room in stable condition. A surgical debrief was performed at the end of the procedure.       Post-operative Plan:   The patient will be  weight-bearing as tolerated. They will receive perioperative antibiotics and be started on aspirin for DVT prophylaxis with mechanical compression devices. They will be discharged from the hospital when pain is controlled and they have met all PT goals.      Attestation Statement:   I was present for and performed all critical portions of the procedure.        Katie Ornelas MD       Date: 3/12/2024  Time: 9:41 AM      This office note was transcribed with dictation software. ?Please excuse any typographical errors, program misunderstandings leading to inadvertent insertions or deletions of inappropriate wording, pronoun errors and other unintentional transcription errors not noticed on proof-reading.

## 2024-03-12 NOTE — PROGRESS NOTES
Medication Education     Medication education for Kun Castle Jr was provided to the patient and family for the following medication(s):  Aspirin  Cefadroxil  Docusate  Meloxicam  Zofran  Oxycodone  Tylenol  Pantoprazole  Senna  Tramadol    Medication education provided by a Pharmacist:  -Proper dose, indication, possible ADRs   -How the medication works and benefits of taking it  -Importance of compliance   -Potential duration of therapy    Identified potential barriers to education:  None    Method(s) of Education:  Verbal Written materials provided and reviewed    An opportunity to ask questions and receive answers was provided.     Assessment of understanding the patient and family:  2= meets goals/outcomes    Additional Notes (if applicable): Meds to beds given to patient and family.    Ruchi Rivera, KenayD

## 2024-03-12 NOTE — PERIOPERATIVE NURSING NOTE
"Discharge instructions given to wife to read.  Pt dressed and sitting in chair stating his knee feels a little \"tight\" but does not hurt.  Pt voices no needs at this time.    "

## 2024-03-12 NOTE — PROGRESS NOTES
Met with Patient and Care Partner at bedside- Patient is s/p Right Total Knee Replacement with Dr. Katie Ornelas.  Discussion with patient included education on the following topics: [facility] Education: Wound Care, Post-Op Activity, Post-Op Precautions, Cold-Therapy, Importance of post-op prescriptions, When to call the Surgeon's Office, Use of MyChart, and When to call 9-1-1.  Patient Did Complete and Live class prior to surgery.  Patient is able to verbalize understanding of class content/discussion.  Contact information was provided to patient for support and assistance during the post-operative period.

## 2024-03-12 NOTE — INTERVAL H&P NOTE
History and Physical Update:  I have reviewed the history and physical dated 3/5/24. History and physical reviewed and relevant findings noted.     Patient examined to review pertinent physical findings: No significant changes.   Home medications reviewed: No significant changes.   Allergies reviewed: No significant changes.     ERAS (Enhanced recovery after surgery): yes, TKA    Katie Ornelas MD    Date: 3/12/2024  Time: 6:52 AM        Patient Active Problem List   Diagnosis    Rheumatoid arthritis (CMS/HCC)    Diverticulosis of large intestine without hemorrhage    Hyperlipidemia    Hypertension    Prostate hypertrophy    Vitamin D deficiency    Arthritis    Arthritis of both knees    Asthma due to seasonal allergies    Cyst of joint of hand    Disorder of bone and cartilage    Extensor tendon rupture, non-traumatic, hand and wrist, left    Extensor tenosynovitis of right wrist    Hypogonadism male    Nontraumatic subluxation of extensor tendon at metacarpophalangeal joint of hand, right    Right inguinal hernia    SOB (shortness of breath)    Unilateral primary osteoarthritis, right knee    Chronic pain of right knee       Current Outpatient Medications   Medication Instructions    acetaminophen (TYLENOL) 1,000 mg, oral, Every 8 hours    aspirin 81 mg, oral, 2 times daily    b complex (B Complex 1, with folic acid,) 0.4 mg tablet 1 tablet, oral, Daily    cefadroxil (DURICEF) 500 mg, oral, 2 times daily    chlorhexidine (Peridex) 0.12 % solution Use as directed    cholecalciferol (Vitamin D-3) 50 MCG (2000 UT) tablet 1 tablet, oral, Daily    docusate sodium (COLACE) 100 mg, oral, 2 times daily    losartan (COZAAR) 50 mg, oral, Daily, as directed    meloxicam (MOBIC) 15 mg, oral, Daily    multivitamin tablet 1 tablet, oral, Daily, SENIOR MULTIVITAMIN    mupirocin (Bactroban) 2 % ointment Topical, 2 times daily    ondansetron (ZOFRAN) 4 mg, oral, Every 8 hours PRN    oxyCODONE (ROXICODONE) 5-10 mg, oral, Every 6  "hours PRN    pantoprazole (PROTONIX) 40 mg, oral, Daily before breakfast, Do not crush, chew, or split.    sennosides (SENOKOT) 8.6 mg, oral, Daily    traMADol (ULTRAM)  mg, oral, Every 6 hours PRN       No Known Allergies    Lab Results   Component Value Date    WBC 6.1 03/05/2024    HGB 12.8 (L) 03/05/2024    HCT 39.4 (L) 03/05/2024    MCV 99 03/05/2024     03/05/2024     No results found for: \"INR\", \"PROTIME\"    Lab Results   Component Value Date    GLUCOSE 96 03/05/2024    CALCIUM 9.0 03/05/2024     03/05/2024    K 4.5 03/05/2024    CO2 24 03/05/2024     03/05/2024    BUN 32 (H) 03/05/2024    CREATININE 1.20 03/05/2024     No results found for: \"CKTOTAL\", \"CKMB\", \"CKMBINDEX\", \"TROPONINI\"  No results found for: \"HGBA1C\"    No results found for: \"CRP\"  Lab Results   Component Value Date    SEDRATE 5 11/05/2018       "

## 2024-03-12 NOTE — PROGRESS NOTES
TCC met with patient at the bedside to discuss discharge plan.  78 yr old male admitted RR following right total knee replacement with Dr. Ornelas  Plan is home today with [facility] PT. SOC Mar 13, 2024.  Family to transport home.    03/12/24 1225   Discharge Planning   Living Arrangements Spouse/significant other   Support Systems Spouse/significant other   Assistance Needed mobility, transfers as needed   Type of Residence Private residence   Number of Stairs to Enter Residence 0   Number of Stairs Within Residence 13   Do you have animals or pets at home? No   Who is requesting discharge planning? Provider   Home or Post Acute Services In home services   Type of Home Care Services Home PT   Patient expects to be discharged to: Home with spouse   Does the patient need discharge transport arranged? No   Financial Resource Strain   How hard is it for you to pay for the very basics like food, housing, medical care, and heating? Not hard   Housing Stability   In the last 12 months, was there a time when you were not able to pay the mortgage or rent on time? N   In the last 12 months, how many places have you lived? 1   In the last 12 months, was there a time when you did not have a steady place to sleep or slept in a shelter (including now)? N   Transportation Needs   In the past 12 months, has lack of transportation kept you from medical appointments or from getting medications? no   In the past 12 months, has lack of transportation kept you from meetings, work, or from getting things needed for daily living? No   Patient Choice   Provider Choice list and CMS website (https://medicare.gov/care-compare#search) for post-acute Quality and Resource Measure Data were provided and reviewed with: Patient   Patient / Family choosing to utilize agency / facility established prior to hospitalization Yes

## 2024-03-12 NOTE — PERIOPERATIVE NURSING NOTE
Pt arrives to Rapid Recovery Room #208.  Pt awake and voicing no c/o.   Admits knee pain 2-3/10.  Able to lift legs off bed and transfer from stretcher to bed without assist.  Pt with GILLIAN hose, ace wrap, mepilex dressing, and ice pack to knee.  Pt with brisk cap refill and pedal pulse at approx 1-2+ operative leg.  Pt and sister aware pt not allowed out of bed or up in room at anytime without a staff member present.  Pt encouraged to order lunch.

## 2024-03-12 NOTE — ANESTHESIA PROCEDURE NOTES
Peripheral Block    Patient location during procedure: pre-op  Start time: 3/12/2024 7:28 AM  End time: 3/12/2024 7:31 AM  Reason for block: at surgeon's request and post-op pain management  Staffing  Performed: attending   Authorized by: Reid Sheikh MD    Performed by: Reid Sheikh MD  Preanesthetic Checklist  Completed: patient identified, IV checked, site marked, risks and benefits discussed, surgical consent, monitors and equipment checked, pre-op evaluation and timeout performed   Timeout performed at: 3/12/2024 7:25 AM  Peripheral Block  Patient position: laying flat  Prep: alcohol swabs, ChloraPrep and site prepped and draped  Patient monitoring: heart rate, cardiac monitor and continuous pulse ox  Block type: adductor canal  Laterality: right  Injection technique: single-shot  Guidance: nerve stimulator and ultrasound guided  Needle  Needle gauge: 21 G  Needle length: 10 cm  Needle localization: ultrasound guidance     image stored in chart  Needle insertion depth: 8 cm  Test dose: negative  Assessment  Injection assessment: negative aspiration for heme, no paresthesia on injection, local visualized surrounding nerve on ultrasound and incremental injection  Paresthesia pain: immediately resolved  Heart rate change: no  Slow fractionated injection: yes  Additional Notes  Dexamethasone 10mg added to local anesthetic.

## 2024-03-12 NOTE — PROGRESS NOTES
"Orthopedic Postoperative Check     Subjective  Interval History: The patient has mild pain, The patient is awaiting PT, The patient is tolerating a diet, The patient has no nausea or vomiting, and The patient is moving feet and ankles freely. Pain well controlled on current regimen.     Objective  Vital signs in last 24 hours:  Temp:  [36 °C (96.8 °F)-36.6 °C (97.9 °F)] 36.4 °C (97.5 °F)  Heart Rate:  [64-87] 67  Resp:  [14-19] 14  BP: (112-178)/(63-81) 119/81    Labs:  WBC   Date Value Ref Range Status   03/05/2024 6.1 4.4 - 11.3 x10*3/uL Final     Bicarbonate   Date Value Ref Range Status   03/05/2024 24 24 - 31 mmol/L Final     Urea Nitrogen   Date Value Ref Range Status   03/05/2024 32 (H) 8 - 25 mg/dL Final     Creatinine   Date Value Ref Range Status   03/05/2024 1.20 0.40 - 1.60 mg/dL Final     Glucose   Date Value Ref Range Status   03/05/2024 96 65 - 99 mg/dL Final     Calcium   Date Value Ref Range Status   03/05/2024 9.0 8.5 - 10.4 mg/dL Final     No results found for: \"PT\", \"PTT\", \"INR\"    Physical Exam  NAD  Dressing/Incision c/d/i  Firing TA/EHL/GS  SILT L4-S1 grossly  2+ DP  WWP    Drain: None    Assessment/Plan    78 y.o. male, s/p Procedure(s):  Arthroplasty Resurfacing Total Knee ( DEPUY ) [ Rapid Recovery ],  POD# 0 doing well.   Continue Physical Therapy and Mobilize  Weightbearing as tolerated   Follow-Up Labs  DVT Prophylaxis:  Aspirin 81 mg BID starting POD 0, continue for 6 weeks, Sequential compression devices, and GILLIAN hose   Antibiotics: Perioperative cefazolin 2 doses (or until discharge). 7-day course of oral Cefadroxil 500 mg BID    Analgesia: wean to orals. Judicious use of opioids.   Ice to surgical site every 4 hours   Advance Diet  IS, Bowel regimen  Dispo Planning  Outpatient Follow-Up: Dr. Ornelas clinic at 2 weeks postoperatively     Katie Ornelas MD  Adult Reconstruction and Joint Replacement    "

## 2024-03-13 ENCOUNTER — HOME CARE VISIT (OUTPATIENT)
Dept: HOME HEALTH SERVICES | Facility: HOME HEALTH | Age: 79
End: 2024-03-13
Payer: MEDICARE

## 2024-03-13 VITALS — HEART RATE: 54 BPM | SYSTOLIC BLOOD PRESSURE: 140 MMHG | OXYGEN SATURATION: 98 % | DIASTOLIC BLOOD PRESSURE: 60 MMHG

## 2024-03-13 PROCEDURE — 1090000002 HH PPS REVENUE DEBIT

## 2024-03-13 PROCEDURE — 1090000001 HH PPS REVENUE CREDIT

## 2024-03-13 PROCEDURE — 0023 HH SOC

## 2024-03-13 PROCEDURE — 169592 NO-PAY CLAIM PROCEDURE

## 2024-03-13 PROCEDURE — G0151 HHCP-SERV OF PT,EA 15 MIN: HCPCS | Mod: HHH

## 2024-03-13 SDOH — HEALTH STABILITY: PHYSICAL HEALTH: EXERCISE COMMENTS: PATIENT COMPLETED THE FOLLOWING EXERCISES: SEATED QUAD SETS, HEEL SLIDES, ANKLE PUMPS,

## 2024-03-13 ASSESSMENT — ENCOUNTER SYMPTOMS
PAIN LOCATION: RIGHT KNEE
PERSON REPORTING PAIN: PATIENT
MUSCLE WEAKNESS: 1
LIMITED RANGE OF MOTION: 1
PAIN: 1
LOWEST PAIN SEVERITY IN PAST 24 HOURS: 4/10
OCCASIONAL FEELINGS OF UNSTEADINESS: 1
HIGHEST PAIN SEVERITY IN PAST 24 HOURS: 4/10
SUBJECTIVE PAIN PROGRESSION: GRADUALLY IMPROVING

## 2024-03-13 ASSESSMENT — ACTIVITIES OF DAILY LIVING (ADL)
ENTERING_EXITING_HOME: MINIMUM ASSIST
TOILETING: STAND BY ASSIST
AMBULATION ASSISTANCE: ONE PERSON
AMBULATION ASSISTANCE: 1
TOILETING: 1
OASIS_M1830: 06

## 2024-03-13 NOTE — SIGNIFICANT EVENT
Thank you for taking my call today regarding your recent joint replacement surgery with Dr. Katie Ornelas.      We discussed that: Home Health Care services (physical and/or occupational therapy) have been initiated Your pain is Controlled on the current regimen Will fluctuate throughout recovery with increased activity You are able to tolerate regular activity and exercises The importance of continued cold therapy throughout recovery The importance of following the prescribed precautions by your surgeon You have had a bowel movement The importance of continuing blood thinner as prescribed The importance of wearing compression stockings as prescribed    You indicated that all of your questions have been answered at the time of our call.    Please don't hesitate to reach out if you have any additional questions or concerns.    Violette Sosa MBA, BSN, RN-BC  BEENA RamirezN, RN  [facility] Nav[facility]  [facility] [phone]

## 2024-03-14 ENCOUNTER — TELEPHONE (OUTPATIENT)
Dept: PRIMARY CARE | Facility: CLINIC | Age: 79
End: 2024-03-14
Payer: MEDICARE

## 2024-03-14 PROCEDURE — 1090000002 HH PPS REVENUE DEBIT

## 2024-03-14 PROCEDURE — 1090000001 HH PPS REVENUE CREDIT

## 2024-03-14 NOTE — TELEPHONE ENCOUNTER
Eduar called and states Kun had leg surgery on Tuesday, he is now home but he is being non compliant. He is talking delusional he will not use his walker and he won't take his medication. He seems to be irate at times. She states she called the surgeon but she didn't get a response.

## 2024-03-15 ENCOUNTER — HOME CARE VISIT (OUTPATIENT)
Dept: HOME HEALTH SERVICES | Facility: HOME HEALTH | Age: 79
End: 2024-03-15
Payer: MEDICARE

## 2024-03-15 VITALS
OXYGEN SATURATION: 98 % | DIASTOLIC BLOOD PRESSURE: 64 MMHG | SYSTOLIC BLOOD PRESSURE: 122 MMHG | RESPIRATION RATE: 18 BRPM | TEMPERATURE: 97.5 F | HEART RATE: 75 BPM

## 2024-03-15 PROCEDURE — G0152 HHCP-SERV OF OT,EA 15 MIN: HCPCS | Mod: HHH

## 2024-03-15 PROCEDURE — 1090000002 HH PPS REVENUE DEBIT

## 2024-03-15 PROCEDURE — 1090000001 HH PPS REVENUE CREDIT

## 2024-03-15 ASSESSMENT — PAIN SCALES - PAIN ASSESSMENT IN ADVANCED DEMENTIA (PAINAD)
CONSOLABILITY: 0 - NO NEED TO CONSOLE.
BODYLANGUAGE: 0
CONSOLABILITY: 0
BREATHING: 0
BODYLANGUAGE: 0 - RELAXED.
NEGVOCALIZATION: 0 - NONE.
FACIALEXPRESSION: 0
FACIALEXPRESSION: 0 - SMILING OR INEXPRESSIVE.
TOTALSCORE: 0
NEGVOCALIZATION: 0

## 2024-03-15 ASSESSMENT — ENCOUNTER SYMPTOMS
DENIES PAIN: 1
PERSON REPORTING PAIN: PATIENT

## 2024-03-15 ASSESSMENT — ACTIVITIES OF DAILY LIVING (ADL)
GROOMING_WITHIN_DEFINED_LIMITS: 1
BATHING_WITHIN_DEFINED_LIMITS: 1
FEEDING_WITHIN_DEFINED_LIMITS: 1
DRESSING_LB_CURRENT_FUNCTION: INDEPENDENT
TOILETING: INDEPENDENT
TOILETING: 1
BATHING ASSESSED: 1
BATHING_CURRENT_FUNCTION: INDEPENDENT

## 2024-03-16 PROCEDURE — 1090000001 HH PPS REVENUE CREDIT

## 2024-03-16 PROCEDURE — 1090000002 HH PPS REVENUE DEBIT

## 2024-03-17 PROCEDURE — 1090000002 HH PPS REVENUE DEBIT

## 2024-03-17 PROCEDURE — 1090000001 HH PPS REVENUE CREDIT

## 2024-03-18 ENCOUNTER — HOME CARE VISIT (OUTPATIENT)
Dept: HOME HEALTH SERVICES | Facility: HOME HEALTH | Age: 79
End: 2024-03-18
Payer: MEDICARE

## 2024-03-18 VITALS — SYSTOLIC BLOOD PRESSURE: 160 MMHG | HEART RATE: 76 BPM | OXYGEN SATURATION: 98 % | DIASTOLIC BLOOD PRESSURE: 78 MMHG

## 2024-03-18 PROCEDURE — G0151 HHCP-SERV OF PT,EA 15 MIN: HCPCS | Mod: HHH

## 2024-03-18 PROCEDURE — 1090000001 HH PPS REVENUE CREDIT

## 2024-03-18 PROCEDURE — 1090000002 HH PPS REVENUE DEBIT

## 2024-03-18 SDOH — HEALTH STABILITY: PHYSICAL HEALTH
EXERCISE COMMENTS: PATIENT COMPLETED THE FOLLOWING EXERCISES: QUAD SETS, ANKLE PUMPS, HEEL SLIDES, SAQ, SLR, HIP ABDUCTION IN SUPINE 3 X 10 EACH. STANDING: HEEL RAISES, HIP ABDUCTION, HIP EXTENSION, SQUATS, LEG CURLS, AND MARCHES 3 X 10 EACH.

## 2024-03-18 ASSESSMENT — ENCOUNTER SYMPTOMS
SUBJECTIVE PAIN PROGRESSION: GRADUALLY IMPROVING
LIMITED RANGE OF MOTION: 1
PAIN LOCATION - PAIN SEVERITY: 2/10
PAIN LOCATION: RIGHT KNEE
PAIN: 1
MUSCLE WEAKNESS: 1

## 2024-03-19 ENCOUNTER — HOME CARE VISIT (OUTPATIENT)
Dept: HOME HEALTH SERVICES | Facility: HOME HEALTH | Age: 79
End: 2024-03-19
Payer: MEDICARE

## 2024-03-19 PROCEDURE — 1090000002 HH PPS REVENUE DEBIT

## 2024-03-19 PROCEDURE — 1090000001 HH PPS REVENUE CREDIT

## 2024-03-20 ENCOUNTER — HOME CARE VISIT (OUTPATIENT)
Dept: HOME HEALTH SERVICES | Facility: HOME HEALTH | Age: 79
End: 2024-03-20
Payer: MEDICARE

## 2024-03-20 VITALS — DIASTOLIC BLOOD PRESSURE: 70 MMHG | OXYGEN SATURATION: 97 % | SYSTOLIC BLOOD PRESSURE: 142 MMHG | HEART RATE: 84 BPM

## 2024-03-20 PROCEDURE — G0151 HHCP-SERV OF PT,EA 15 MIN: HCPCS | Mod: HHH

## 2024-03-20 PROCEDURE — 1090000002 HH PPS REVENUE DEBIT

## 2024-03-20 PROCEDURE — 1090000001 HH PPS REVENUE CREDIT

## 2024-03-20 SDOH — HEALTH STABILITY: PHYSICAL HEALTH
EXERCISE COMMENTS: PATIENT COMPLETED THE FOLLOWING EXERCISES: SEATED LAQ, HEEL SLIDES, QUAD SET, SLR, AND SAQ 3 X 10 EACH. STANDING: HIP FLEXION, HIP ABDUCTION, HIP EXTENSION, SQUATS, AND HEEL RAISES, AND LEG CURLS 3 X 10 EACH.

## 2024-03-20 ASSESSMENT — ENCOUNTER SYMPTOMS
PAIN LOCATION - PAIN SEVERITY: 2/10
PAIN LOCATION: RIGHT KNEE
PAIN: 1
SUBJECTIVE PAIN PROGRESSION: GRADUALLY IMPROVING

## 2024-03-20 ASSESSMENT — ACTIVITIES OF DAILY LIVING (ADL): AMBULATION ASSISTANCE ON FLAT SURFACES: 1

## 2024-03-21 PROCEDURE — 1090000002 HH PPS REVENUE DEBIT

## 2024-03-21 PROCEDURE — 1090000001 HH PPS REVENUE CREDIT

## 2024-03-22 PROCEDURE — 1090000001 HH PPS REVENUE CREDIT

## 2024-03-22 PROCEDURE — 1090000002 HH PPS REVENUE DEBIT

## 2024-03-23 PROCEDURE — 1090000002 HH PPS REVENUE DEBIT

## 2024-03-23 PROCEDURE — 1090000001 HH PPS REVENUE CREDIT

## 2024-03-24 PROCEDURE — 1090000001 HH PPS REVENUE CREDIT

## 2024-03-24 PROCEDURE — 1090000002 HH PPS REVENUE DEBIT

## 2024-03-25 ENCOUNTER — HOME CARE VISIT (OUTPATIENT)
Dept: HOME HEALTH SERVICES | Facility: HOME HEALTH | Age: 79
End: 2024-03-25
Payer: MEDICARE

## 2024-03-25 VITALS
DIASTOLIC BLOOD PRESSURE: 70 MMHG | RESPIRATION RATE: 18 BRPM | OXYGEN SATURATION: 96 % | SYSTOLIC BLOOD PRESSURE: 155 MMHG | HEART RATE: 72 BPM

## 2024-03-25 PROCEDURE — 1090000001 HH PPS REVENUE CREDIT

## 2024-03-25 PROCEDURE — G0151 HHCP-SERV OF PT,EA 15 MIN: HCPCS | Mod: HHH

## 2024-03-25 PROCEDURE — 1090000002 HH PPS REVENUE DEBIT

## 2024-03-25 SDOH — HEALTH STABILITY: PHYSICAL HEALTH: EXERCISE COMMENTS: PATIENT COMPLETED THE FOLLOWING: SEATED LAQ, QUAD SETS, HEEL SLIDES,

## 2024-03-25 ASSESSMENT — ENCOUNTER SYMPTOMS
LIMITED RANGE OF MOTION: 1
MUSCLE WEAKNESS: 1
PERSON REPORTING PAIN: PATIENT
DENIES PAIN: 1

## 2024-03-25 ASSESSMENT — ACTIVITIES OF DAILY LIVING (ADL)
AMBULATION ASSISTANCE: STAND BY ASSIST
CURRENT_FUNCTION: STAND BY ASSIST

## 2024-03-26 PROCEDURE — 1090000002 HH PPS REVENUE DEBIT

## 2024-03-26 PROCEDURE — 1090000001 HH PPS REVENUE CREDIT

## 2024-03-27 ENCOUNTER — HOME CARE VISIT (OUTPATIENT)
Dept: HOME HEALTH SERVICES | Facility: HOME HEALTH | Age: 79
End: 2024-03-27
Payer: MEDICARE

## 2024-03-27 VITALS — HEART RATE: 65 BPM | SYSTOLIC BLOOD PRESSURE: 152 MMHG | OXYGEN SATURATION: 98 % | DIASTOLIC BLOOD PRESSURE: 80 MMHG

## 2024-03-27 PROCEDURE — 1090000001 HH PPS REVENUE CREDIT

## 2024-03-27 PROCEDURE — 1090000002 HH PPS REVENUE DEBIT

## 2024-03-27 PROCEDURE — G0151 HHCP-SERV OF PT,EA 15 MIN: HCPCS | Mod: HHH

## 2024-03-27 SDOH — HEALTH STABILITY: PHYSICAL HEALTH
EXERCISE COMMENTS: PATIENT COMPLETED THE FOLLOWING EXERCISES: SEATED LAQ, HIP FLEXION, AND HEEL SLIDES 3 X 10. SUPINE QUAD SETS, SAQ, SLR. STANDING: MARCHES, HIP ABDUCTION, HIP EXTENSION, HEEL RAISE, SQUATS, AND LEG CURLS. SINGLE LEG BALANCE AND SIDE STEPPING.

## 2024-03-27 ASSESSMENT — ACTIVITIES OF DAILY LIVING (ADL)
OASIS_M1830: 00
AMBULATION ASSISTANCE: 1
AMBULATION ASSISTANCE ON FLAT SURFACES: 1
AMBULATION ASSISTANCE: INDEPENDENT
HOME_HEALTH_OASIS: 00

## 2024-03-27 ASSESSMENT — ENCOUNTER SYMPTOMS
MUSCLE WEAKNESS: 1
DENIES PAIN: 1
LIMITED RANGE OF MOTION: 1

## 2024-03-28 NOTE — PROGRESS NOTES
cbcSubjective   Kun Castle Jr is a 78 y.o. male who presents for patient is here for follow-up and medicare wellness.  HPI  Kun is a 77-year-old male with known history of hypertension dyslipidemia vitamin D deficiency rheumatoid arthritis patient is here for follow-up, with major complaint denies chest pain shortness of breath fever chills nausea vomiting constipation diarrhea dysuria urgency frequency. Here to reviewer.    Review of Systems  10 system review pertinent as above  Objective     Visit Vitals  /82   Pulse 78   Temp 36.5 °C (97.7 °F)   Resp 16      Physical Exam  HEENT: Atraumatic normocephalic the pupils are equal and round and reactive to light the sclerae nonicteric extraocular motion are intact.  Neck: Is supple without JVD no carotid bruits the trachea is midline there are no masses pulses are equal and bilateral with normal upstroke.  Skin: Normal.  Skin good texture.  Moist.  Good turgor.  No lesions, no rashes.  Lymph: No lymphadenopathy appreciated, no masses, no lesions  Lungs: Are clear to auscultation and percussion, good breath sounds bilaterally, no rhonchi, no wheezing, good diaphragmatic excursion.  Heart: Normal rate and normal rhythm S1, S2, no S3, no gallop, murmur or rub.  Abdomen: Soft, nontender, no organomegaly, good bowel sounds.    Extremities: Full range of motion, good pulses bilateral.  No cyanosis, no clubbing or edema.  Neuro: Cranial nerves II-XII are grossly intact there is no sensory or motor deficits.  Able to move all extremities.      Assessment/Plan     Sp rt knee replacement  Doing well    CBC BMP LIPID AST ALT Vitamin d      Continue with the low-fat, low-cholesterol diet,  I recommended Mediterranean diet, which include fish, chicken, vegetables and olive oil  Exercise daily for 30 minutes at least 3 times a week  Continue home medications    Hypertension  No added salt diet, do not and salt to your food  Try to exercise every other day for 30  minutes  Continue current medications    Rheumatoid arthritis  With deformed joint integrity  Occasional pain      Problem List Items Addressed This Visit       Hyperlipidemia - Primary    Relevant Orders    CBC    Basic Metabolic Panel    Lipid Panel    AST    ALT    Hypertension    Relevant Orders    CBC    Vitamin D deficiency    Relevant Orders    CBC     Hayden Bro MD

## 2024-04-01 ENCOUNTER — OFFICE VISIT (OUTPATIENT)
Dept: ORTHOPEDIC SURGERY | Facility: CLINIC | Age: 79
End: 2024-04-01
Payer: MEDICARE

## 2024-04-01 DIAGNOSIS — Z96.651 S/P TOTAL KNEE ARTHROPLASTY, RIGHT: Primary | ICD-10-CM

## 2024-04-01 PROCEDURE — 99024 POSTOP FOLLOW-UP VISIT: CPT | Performed by: STUDENT IN AN ORGANIZED HEALTH CARE EDUCATION/TRAINING PROGRAM

## 2024-04-01 PROCEDURE — 1159F MED LIST DOCD IN RCRD: CPT | Performed by: STUDENT IN AN ORGANIZED HEALTH CARE EDUCATION/TRAINING PROGRAM

## 2024-04-01 ASSESSMENT — PAIN - FUNCTIONAL ASSESSMENT: PAIN_FUNCTIONAL_ASSESSMENT: NO/DENIES PAIN

## 2024-04-02 ENCOUNTER — OFFICE VISIT (OUTPATIENT)
Dept: PRIMARY CARE | Facility: CLINIC | Age: 79
End: 2024-04-02
Payer: MEDICARE

## 2024-04-02 VITALS
HEART RATE: 78 BPM | WEIGHT: 189 LBS | TEMPERATURE: 97.7 F | HEIGHT: 69 IN | RESPIRATION RATE: 16 BRPM | DIASTOLIC BLOOD PRESSURE: 82 MMHG | SYSTOLIC BLOOD PRESSURE: 129 MMHG | BODY MASS INDEX: 27.99 KG/M2

## 2024-04-02 DIAGNOSIS — E55.9 VITAMIN D DEFICIENCY: ICD-10-CM

## 2024-04-02 DIAGNOSIS — I10 PRIMARY HYPERTENSION: ICD-10-CM

## 2024-04-02 DIAGNOSIS — E78.2 MODERATE MIXED HYPERLIPIDEMIA NOT REQUIRING STATIN THERAPY: Primary | ICD-10-CM

## 2024-04-02 PROCEDURE — 99214 OFFICE O/P EST MOD 30 MIN: CPT | Performed by: INTERNAL MEDICINE

## 2024-04-02 PROCEDURE — 85025 COMPLETE CBC W/AUTO DIFF WBC: CPT | Performed by: INTERNAL MEDICINE

## 2024-04-02 PROCEDURE — 3079F DIAST BP 80-89 MM HG: CPT | Performed by: INTERNAL MEDICINE

## 2024-04-02 PROCEDURE — 1126F AMNT PAIN NOTED NONE PRSNT: CPT | Performed by: INTERNAL MEDICINE

## 2024-04-02 PROCEDURE — 84450 TRANSFERASE (AST) (SGOT): CPT | Performed by: INTERNAL MEDICINE

## 2024-04-02 PROCEDURE — 80048 BASIC METABOLIC PNL TOTAL CA: CPT | Performed by: INTERNAL MEDICINE

## 2024-04-02 PROCEDURE — 80061 LIPID PANEL: CPT | Performed by: INTERNAL MEDICINE

## 2024-04-02 PROCEDURE — 84460 ALANINE AMINO (ALT) (SGPT): CPT | Performed by: INTERNAL MEDICINE

## 2024-04-02 PROCEDURE — 1159F MED LIST DOCD IN RCRD: CPT | Performed by: INTERNAL MEDICINE

## 2024-04-02 PROCEDURE — 3074F SYST BP LT 130 MM HG: CPT | Performed by: INTERNAL MEDICINE

## 2024-04-02 PROCEDURE — 1036F TOBACCO NON-USER: CPT | Performed by: INTERNAL MEDICINE

## 2024-04-02 ASSESSMENT — PAIN SCALES - GENERAL: PAINLEVEL: 0-NO PAIN

## 2024-04-02 ASSESSMENT — ENCOUNTER SYMPTOMS
DEPRESSION: 0
LOSS OF SENSATION IN FEET: 0
OCCASIONAL FEELINGS OF UNSTEADINESS: 0

## 2024-04-04 ENCOUNTER — EVALUATION (OUTPATIENT)
Dept: PHYSICAL THERAPY | Facility: CLINIC | Age: 79
End: 2024-04-04
Payer: MEDICARE

## 2024-04-04 DIAGNOSIS — M25.561 ACUTE POSTOPERATIVE PAIN OF RIGHT KNEE: Primary | ICD-10-CM

## 2024-04-04 DIAGNOSIS — Z96.651 S/P TOTAL KNEE ARTHROPLASTY, RIGHT: ICD-10-CM

## 2024-04-04 DIAGNOSIS — G89.18 ACUTE POSTOPERATIVE PAIN OF RIGHT KNEE: Primary | ICD-10-CM

## 2024-04-04 PROBLEM — M25.661 STIFFNESS OF RIGHT KNEE: Status: ACTIVE | Noted: 2024-04-04

## 2024-04-04 PROCEDURE — 97110 THERAPEUTIC EXERCISES: CPT | Mod: GP | Performed by: PHYSICAL THERAPIST

## 2024-04-04 PROCEDURE — 97161 PT EVAL LOW COMPLEX 20 MIN: CPT | Mod: GP | Performed by: PHYSICAL THERAPIST

## 2024-04-04 ASSESSMENT — ENCOUNTER SYMPTOMS
DEPRESSION: 0
OCCASIONAL FEELINGS OF UNSTEADINESS: 0
LOSS OF SENSATION IN FEET: 0

## 2024-04-04 NOTE — PROGRESS NOTES
Physical Therapy    Physical Therapy Evaluation and Treatment    Patient Name: Kun Castle Jr  MRN: [id number]  Today's Date: 4/4/2024    Time Calculation  Start Time: 1045  Stop Time: 1125  Time Calculation (min): 40 min    Current Problem:   1. Acute postoperative pain of right knee        2. S/P total knee arthroplasty, right  Referral to Physical Therapy        Cert sent 4/4-7/3/24 and signed today  Relevant Past Medical History: Hypertension, dyslipidemia, vitamin D deficiency rheumatoid arthritis,hernia RT inguinal,Sciatica, GERD  Surgical History: 3-12-24 TKA RT  Medications: losartan 50mgB complex, Vit D  Allergies: NKA    Precautions: TKA  STEADI Fall Risk: 2 (score of 4+ indicates fall risk)       SUBJECTIVE:   77 yo male, sent to PT for RT TKA DOS-3-12-24 (3 weeks 2 days post op) with a hx of several years of disabling RT knee pain and failed conservative rx-PT here in past.   Pt reports having dissolvable stitches and glue per pt. Pt used a walker for one day.  Pt had home therapy. Pt reports some swelling.      Pre op:ambulated without assistive device. Pt also has lt knee O/A severe. Stiffness in both legs with walking post sitting.    Pain has been present past 3-4 years.    Imaging:   X-rays per MD note 4/1/24:implants in good position with no evidence of complication       Pain:   Current: 0/10  Highest: 1-2/10 sleeping knee straight  Location: knee RT   Description: ache  Aggravating Factors: extension in bed  Relieving Factors: ice 4-5x day  Sleep Pattern: side and stomach  Previous Interventions: PT, injections       Red flags: negative    Occupation: retired  Hobbies: golf  Home Living: retired, lives with wife, 2 steps to landing and 13 steps,stairs-performs one at time needs to lead left, using hand rail  Prior Level of Function: walked without cane    Patient/Family Goal: improve rom     Outcome Measures: WOMAC-19%     OBJECTIVE:    Cognition: intact  Posture: deferred    Gait: no  assistive device, mild decreased stance of RT LE   Functional Mobility: sit to stand B UE support  Palpation: see incision  Joint Mobility:   Hip rom full with standing hip abd, full hip ER to doff shoes  KNEE ROM:  RT 4-seated heel slide 105, contra leg 130    LE STRENGTH:RT  Psoas 3-/5   Quad 3-/5, SLR slight lag  Hamstring 3/5   TA 3/5 at least  ABD hip 3-/5 at least stand   Hip extension 3-/5 at least stand  PF  4/5 can bilateral heel raise in standing     INCISION: healing, scabs present, slight red but not raised and no drainage, support stocking to calf , 52.5 jt swelling noted and slight warmth    Treatments:  Therapeutic Exercise: (20 minutes)   See hep performance, pt has handouts issued by home health, cautioned pt against MS right now   Manual Therapy: ( minutes)    Gait Training: ( minutes)    Neuromuscular Re-education: ( minutes)    Therapeutic Activity: (1 minutes)    OP Education: avoid deep squat, pivot, kneel, TKA precautions     HEP:reviewed program/performed. pt was doing hep 2x a day up 30 reps some livier  Seated: , heel/toe raises x10,heel slides x10, hip flexion, LAQ  Stand: x10 ea:ham curl alternate, hip abd SLR, hip extension, heel raises, march  Supine: x10 quad sets/5 sec-small towel roll, gluteal sets/5 sec, SAQ,ankle PF/DF    Response to treatment: no greater sx and pt with good form    ASSESSMENT:   79 yo male, sent to PT for RT TKA DOS-3-12-24 (3 weeks 2 days post op) with a hx of several years of disabling RT knee pain and failed conservative rx-PT here in past.  Pt presents with decreased knee rom/jt stiffness, swelling, gait deviations, weakness, difficulty with stairs, functional zrxrlasclta-[id number]-12%, mild pain worse with sleeping knee extension.   Co morbidities: Hypertension, dyslipidemia, vitamin D deficiency rheumatoid arthritis,hernia RT inguinal,Sciatica, GERD. Pt could benefit from PT to address the above impairments and increase function.       PLAN:  OP PT  PLAN:  Treatment/Interventions: Cryotherapy , Gait training , Manual Therapy  , Neuromuscular re-education , Self care/home management , Therapeutic activities , and Therapeutic exercise    PT Plan: Skilled PT   PT Frequency: 2 times per week   Duration:10  Certification Period Start Date:   Certification Period End Date:   Visits Approved: [facility] A/[place] - [place] AUTH / [place] / [place]115.78 USED 2024 PT/[facility] SUPP  4/4/24  Rehab Potential: Excellent  Plan of Care Agreement: Patient         Goals:   STG 8  Pt will be independent with HEP  Increase knee RT ROM 0-115 so pt can sit to stand with ease  Increase quad strength to SLR with no lag  Decreased swelling 0-.5/cm, healed incision    LTG 16  Function to increase based on WOMAC scores improve 10%, now 19%  Strength improve 1 MMT, perform stairs step over step  Improved ability to sleep and have full knee extension  Pt to walk community distances without increased pain

## 2024-04-11 ENCOUNTER — TREATMENT (OUTPATIENT)
Dept: PHYSICAL THERAPY | Facility: CLINIC | Age: 79
End: 2024-04-11
Payer: MEDICARE

## 2024-04-11 DIAGNOSIS — M25.661 STIFFNESS OF RIGHT KNEE: Primary | ICD-10-CM

## 2024-04-11 DIAGNOSIS — G89.18 ACUTE POSTOPERATIVE PAIN OF RIGHT KNEE: ICD-10-CM

## 2024-04-11 DIAGNOSIS — M25.561 ACUTE POSTOPERATIVE PAIN OF RIGHT KNEE: ICD-10-CM

## 2024-04-11 PROCEDURE — 97110 THERAPEUTIC EXERCISES: CPT | Mod: GP | Performed by: PHYSICAL THERAPIST

## 2024-04-11 NOTE — PROGRESS NOTES
Physical Therapy    Physical Therapy Treatment    Patient Name: Kun Castle Jr  MRN: [id number]  Today's Date: 4/11/2024    Time Calculation  Start Time: 0915  Stop Time: 0959  Time Calculation (min): 44 min    Visit #: 2 out of 10 goals, 20 LTG  Evaluation date: 4/4/24    Current Problem:   1. Stiffness of right knee        2. Acute postoperative pain of right knee  Follow Up In Physical Therapy        Post op 4 weeks  SUBJECTIVE:   Walked in [employer] yesterday and more sore post, using ice regular basis, stiffness mild.     Precautions: no kneel or deep squatPast Medical History: Hypertension, dyslipidemia, vitamin D deficiency rheumatoid arthritis,hernia RT inguinal,Sciatica, GERD         Pain:   Start of session: 0       OBJECTIVE:    1-115 deg flexion with active heel slide in supine   Proximal and distal incision some scabbing, no drainage closed  Pt walking without a cane    Treatments:  Therapeutic Exercise: (44 minutes)RT post surgical    Seated:    heel/toe raises x12  heel slides x15   LAQ x 10 left right     Stand: UE support  -ham curl x 15 left/RT  hip extension x 15 RT/LT   heel raises x15  March x 15  Mini squats x10     Supine: x15 quad sets/5 sec followed by 2x5 SLR  -gluteal sets/5 sec  SAQ x15  ankle PF/DF x15  Heel slides x10  Side lying hip abduction x11  DLS EC 30 sec      Manual Therapy: ( minutes)    Gait Training: ( minutes)    Neuromuscular Re-education: ( minutes)    Therapeutic Activity: ( minutes)       HEP:2x day as tolerated issued with home PT   Seated: , heel/toe raises x10,heel slides x10, hip flexion-stand, LAQ  Stand: x10 ea:ham curl alternate, hip abd SLR, hip extension, heel raises, march  Supine: x10 quad sets/5 sec-small towel roll, gluteal sets/5 sec, SAQ,ankle PF/DF   add hip supine SLR 2x5 and hip abduction on side x10 with good quad set, 3x week    ASSESSMENT:   Pt progressing well with rom and strength of quad improved.        Post session pain: no worse      PLAN:  More closed chain as pt tolerates   OP PT PLAN:  Treatment/Interventions: Cryotherapy , Education/Instruction , Gait training , Manual Therapy  , Neuromuscular re-education , Self care/home management , Therapeutic activities , and Therapeutic exercise    PT Plan: Skilled PT   PT Frequency: 2 times per week   Duration:10  Certification Period Start Date:   Certification Period End Date:   [facility] [place] [facility] / [place]115.78 USED 2024 PT/[facility] SUPP  4/4/24   Rehab Potential: Excellent  Plan of Care Agreement: Patient         Goals:   STG 8  Pt will be independent with HEP  Increase knee RT ROM 0-115 so pt can sit to stand with ease  Increase quad strength to SLR with no lag  Decreased swelling 0-.5/cm, healed incision     LTG 16  Function to increase based on WOMAC scores improve 10%, now 19%  Strength improve 1 MMT, perform stairs step over step  Improved ability to sleep and have full knee extension  Pt to walk community distances without increased pain

## 2024-04-16 ENCOUNTER — CLINICAL SUPPORT (OUTPATIENT)
Dept: PHYSICAL THERAPY | Facility: CLINIC | Age: 79
End: 2024-04-16
Payer: MEDICARE

## 2024-04-16 DIAGNOSIS — M25.661 STIFFNESS OF RIGHT KNEE: Primary | ICD-10-CM

## 2024-04-16 DIAGNOSIS — G89.18 ACUTE POSTOPERATIVE PAIN OF RIGHT KNEE: ICD-10-CM

## 2024-04-16 DIAGNOSIS — M25.561 ACUTE POSTOPERATIVE PAIN OF RIGHT KNEE: ICD-10-CM

## 2024-04-16 PROCEDURE — 97110 THERAPEUTIC EXERCISES: CPT | Mod: GP,CQ

## 2024-04-16 NOTE — PROGRESS NOTES
Physical Therapy    Physical Therapy Treatment    Patient Name: Kun Castle Jr  MRN: [id number]  Today's Date: 4/16/2024    Time Calculation  Start Time: 0915  Stop Time: 0958  Time Calculation (min): 43 min    Visit #: 3 out of 10 goals, 20 LTG  Evaluation date: 4/4/24    Current Problem:   1. Stiffness of right knee        2. Acute postoperative pain of right knee  Follow Up In Physical Therapy        Post op 4 weeks  SUBJECTIVE:   .Pt reports feeling good today. Denies pain in right knee, left knee is also starting to feel better now that he is putting more weight through right LE .  Compliant with HEP. Ambulates without device     Precautions: no kneel or deep squatPast Medical History: Hypertension, dyslipidemia, vitamin D deficiency rheumatoid arthritis,hernia RT inguinal,Sciatica, GERD         Pain:   Start of session: 0       OBJECTIVE:    1-120  AAROM heel slide with strap  in long sit    Treatments:  Therapeutic Exercise: (43 minutes)RT post surgical    Seated:  Heel slide with towel x 2 min  LAQ 2 x 10  R/L  Standing  at bar with SA support as needed:  Stance right with tap outs left into abd  2 x 10,, extension 2 x 10  Heel raises 2 x 10  March 2 x 10  Half tandem stance with slow head turns L/R 2 x 5  Sit to stand from elevated mat 3 x 5  Supine:  -gluteal sets + low lift bridge  2 x 10  SAQ +hip adduction ball 2 x 10  QS + SLR right 3 x 5 (no lag)  Side lying hip abduction   Nustep L 2-4 for 4 min seat at 11           Manual Therapy: ( minutes)    Gait Training: ( minutes)    Neuromuscular Re-education: ( minutes)    Therapeutic Activity: ( minutes)       HEP:2x day as tolerated issued with home PT   Seated: , heel/toe raises x10,heel slides x10, hip flexion-stand, LAQ  Stand: x10 ea:ham curl alternate, hip abd SLR, hip extension, heel raises, march  Supine: x10 quad sets/5 sec-small towel roll, gluteal sets/5 sec, SAQ,ankle PF/DF   add hip supine SLR 2x5 and hip abduction on side x10 with good quad  set, 3x week    ASSESSMENT:   Progressing well with right Knee ROM and quad strengthening, Increased reps with exercises this date adding to nustep to work on muscle endurance        Post session pain: denies change     PLAN:  More closed chain as pt tolerates   OP PT PLAN:  Treatment/Interventions: Cryotherapy , Education/Instruction , Gait training , Manual Therapy  , Neuromuscular re-education , Self care/home management , Therapeutic activities , and Therapeutic exercise    PT Plan: Skilled PT   PT Frequency: 2 times per week   Duration:10  Certification Period Start Date:   Certification Period End Date:   [facility] [place] [facility] [place]115.78 USED 2024 PT/[facility] SUPP  4/4/24   Rehab Potential: Excellent  Plan of Care Agreement: Patient         Goals:   STG 8  Pt will be independent with HEP  Increase knee RT ROM 0-115 so pt can sit to stand with ease  Increase quad strength to SLR with no lag  Decreased swelling 0-.5/cm, healed incision     LTG 16  Function to increase based on WOMAC scores improve 10%, now 19%  Strength improve 1 MMT, perform stairs step over step  Improved ability to sleep and have full knee extension  Pt to walk community distances without increased pain

## 2024-04-18 ENCOUNTER — TREATMENT (OUTPATIENT)
Dept: PHYSICAL THERAPY | Facility: CLINIC | Age: 79
End: 2024-04-18
Payer: MEDICARE

## 2024-04-18 DIAGNOSIS — M25.661 STIFFNESS OF RIGHT KNEE: Primary | ICD-10-CM

## 2024-04-18 DIAGNOSIS — G89.18 ACUTE POSTOPERATIVE PAIN OF RIGHT KNEE: ICD-10-CM

## 2024-04-18 DIAGNOSIS — M25.561 ACUTE POSTOPERATIVE PAIN OF RIGHT KNEE: ICD-10-CM

## 2024-04-18 PROCEDURE — 97110 THERAPEUTIC EXERCISES: CPT | Mod: GP | Performed by: PHYSICAL THERAPIST

## 2024-04-18 NOTE — PROGRESS NOTES
Physical Therapy    Physical Therapy Treatment    Patient Name: Kun Castle Jr  MRN: [id number]  Today's Date: 4/18/2024    Time Calculation  Start Time: 0827  Stop Time: 0910  Time Calculation (min): 43 min    Visit #: 4 out of 10 goals, 20 LTG  Evaluation date: 4/4/24    Current Problem:   1. Stiffness of right knee        2. Acute postoperative pain of right knee  Follow Up In Physical Therapy        Post op 5 weeks  SUBJECTIVE:   His left knee can get sore, RT knee doing well every once in a while get a little click, ok after last session    Denies pain in right knee,    Precautions: no kneel or deep squatPast Medical History: Hypertension, dyslipidemia, vitamin D deficiency rheumatoid arthritis,hernia RT inguinal,Sciatica, GERD         Pain:   Start of session: 0       OBJECTIVE:    1-120  AAROM heel slide with strap  in long sit    Treatments:  Therapeutic Exercise: ( minutes)RT post surgical    Seated:  Heel slide with towel 2x12 LT/RT   LAQ 2 x 12  R/L    Standing:  at bar with SA support as needed:  SLR  tap outs left/right into abd  2 x 12, extension 2 x 12  Heel raises 2 x 12  March 2 x 12  Half tandem stance with slow head turns L/R 2 x 5  DLS feet together balance with one eye closed 30 se ea   Sit to stand from elevated mat 3 x 6  RT ham curl 2x10    Supine:  - low lift bridge  2 x 12  SAQ 2 x 12/0# RT/LT  QS x10 towel roll Lt/Rt   SLR right 2x8 RT/LT  Side lying hip abduction 2x12  Nustep UE/LEL 2 for 6 min ,seat at 11      Manual Therapy: ( minutes)    Gait Training: ( minutes)    Neuromuscular Re-education: ( minutes)    Therapeutic Activity: ( minutes)       HEP:2x day as tolerated issued with home PT   Seated: , heel/toe raises x10,heel slides x10, hip flexion-stand, LAQ  Stand: x10 ea:ham curl alternate, hip abd SLR, hip extension, heel raises, march  Supine: x10 quad sets/5 sec-small towel roll, gluteal sets/5 sec, SAQ,ankle PF/DF   add hip supine SLR 2x5 and hip abduction on side x10 with  good quad set, 3x week    ASSESSMENT:   Progressing well with right Knee ROM and quad strengthening, Increased reps with exercises this date, progressed balance exercises for fall prevention        Post session pain: denies change     PLAN:  More closed chain as pt tolerates, step ups   OP PT PLAN:  Treatment/Interventions: Cryotherapy , Education/Instruction , Gait training , Manual Therapy  , Neuromuscular re-education , Self care/home management , Therapeutic activities , and Therapeutic exercise    PT Plan: Skilled PT   PT Frequency: 2 times per week   Duration:10  Certification Period Start Date:   Certification Period End Date:   [facility]its [facility]  [facility] [place] [facility] / [place]115.78 USED 2024 PT/[facility] SUPP  4/4/24   Rehab Potential: Excellent  Plan of Care Agreement: Patient         Goals:   STG 8  Pt will be independent with HEP  Increase knee RT ROM 0-115 so pt can sit to stand with ease  Increase quad strength to SLR with no lag  Decreased swelling 0-.5/cm, healed incision     LTG 16  Function to increase based on WOMAC scores improve 10%, now 19%  Strength improve 1 MMT, perform stairs step over step  Improved ability to sleep and have full knee extension  Pt to walk community distances without increased pain

## 2024-04-23 ENCOUNTER — TREATMENT (OUTPATIENT)
Dept: PHYSICAL THERAPY | Facility: CLINIC | Age: 79
End: 2024-04-23
Payer: MEDICARE

## 2024-04-23 DIAGNOSIS — M25.661 STIFFNESS OF RIGHT KNEE: Primary | ICD-10-CM

## 2024-04-23 DIAGNOSIS — M25.561 ACUTE POSTOPERATIVE PAIN OF RIGHT KNEE: ICD-10-CM

## 2024-04-23 DIAGNOSIS — G89.18 ACUTE POSTOPERATIVE PAIN OF RIGHT KNEE: ICD-10-CM

## 2024-04-23 PROCEDURE — 97110 THERAPEUTIC EXERCISES: CPT | Mod: GP | Performed by: PHYSICAL THERAPIST

## 2024-04-23 NOTE — PROGRESS NOTES
Physical Therapy    Physical Therapy Treatment    Patient Name: Kun Castle Jr  MRN: [id number]  Today's Date: 4/23/2024    Time Calculation  Start Time: 0830  Stop Time: 0914  Time Calculation (min): 44 min    Visit #: 5 out of 10 goals, 20 LTG  Evaluation date: 4/4/24    Current Problem:   1. Stiffness of right knee        2. Acute postoperative pain of right knee  Follow Up In Physical Therapy        Post op 6 weeks  SUBJECTIVE:   His left knee can get sore, RT knee mild sx , ok after last session      Precautions: no kneel or deep squatPast Medical History: Hypertension, dyslipidemia, vitamin D deficiency rheumatoid arthritis,hernia RT inguinal,Sciatica, GERD         Pain:   Start of session: 1       OBJECTIVE:  RT knee  1-120  AAROM heel slide with strap  in long sit  Incision still has some areas of scabbing and knee is still swollen  Treatments:  Therapeutic Exercise: ( 44 minutes)RT post surgical    Seated:  Heel slide with towel 2x12 LT/RT   LAQ 2 x 13  R/L    Standing:  at bar with SA support as needed:  -SLR   left/right into abd  2 x 13, extension tap 2 x 13  -Heel raises 2 x 13  -March 2 x 13  -Half tandem stance with slow head turns L/R 2 x 5  -DLS feet together balance with one eye closed 30 sec ea  -Side step x4  -Backward step x2  Sit to stand from elevated mat 3 x 6 DNp  -RT ham curl 2x13    Supine:  - low lift bridge  2 x 14  SAQ 2 x 14/0# RT/LT  QS x10 towel roll Lt/Rt   SLR right 2x10 RT/LT  Side lying hip abduction 2x14  Nustep UE/LE L2 for 7 min ,seat at 11      Manual Therapy: ( minutes)    Gait Training: ( minutes)    Neuromuscular Re-education: ( minutes)    Therapeutic Activity: ( minutes)       HEP:2x day as tolerated issued with home PT   Seated: , heel/toe raises x10,heel slides x10, hip flexion-stand, LAQ  Stand: x10 ea:ham curl alternate, hip abd SLR, hip extension, heel raises, march  Supine: x10 quad sets/5 sec-small towel roll, gluteal sets/5 sec, SAQ,ankle PF/DF   add hip  supine SLR 2x5 and hip abduction on side x10 with good quad set, 3x week    ASSESSMENT:   Progressing well with right Knee ROM and quad strengthening,  progressed balance exercises for fall prevention introducing some lateral motion       Post session pain: denies change     PLAN:  More closed chain as pt tolerates, step ups   OP PT PLAN:  Treatment/Interventions: Cryotherapy , Education/Instruction , Gait training , Manual Therapy  , Neuromuscular re-education , Self care/home management , Therapeutic activities , and Therapeutic exercise    PT Plan: Skilled PT   PT Frequency: 2 times per week   Duration:10  Certification Period Start Date:   Certification Period End Date:   Visits Approved:  [facility] [place] / [place] / [place]115.78 USED 2024 PT/[facility]  4/4/24   Rehab Potential: Excellent  Plan of Care Agreement: Patient         Goals:   STG 8  Pt will be independent with HEP  Increase knee RT ROM 0-115 so pt can sit to stand with ease  Increase quad strength to SLR with no lag  Decreased swelling 0-.5/cm, healed incision     LTG 16  Function to increase based on WOMAC scores improve 10%, now 19%  Strength improve 1 MMT, perform stairs step over step  Improved ability to sleep and have full knee extension  Pt to walk community distances without increased pain

## 2024-04-25 ENCOUNTER — TREATMENT (OUTPATIENT)
Dept: PHYSICAL THERAPY | Facility: CLINIC | Age: 79
End: 2024-04-25
Payer: MEDICARE

## 2024-04-25 DIAGNOSIS — M25.561 ACUTE POSTOPERATIVE PAIN OF RIGHT KNEE: ICD-10-CM

## 2024-04-25 DIAGNOSIS — M25.661 STIFFNESS OF RIGHT KNEE: Primary | ICD-10-CM

## 2024-04-25 DIAGNOSIS — G89.18 ACUTE POSTOPERATIVE PAIN OF RIGHT KNEE: ICD-10-CM

## 2024-04-25 PROCEDURE — 97110 THERAPEUTIC EXERCISES: CPT | Mod: GP | Performed by: PHYSICAL THERAPIST

## 2024-04-25 NOTE — PROGRESS NOTES
Physical Therapy    Physical Therapy Treatment    Patient Name: Kun Castle Jr  MRN: [id number]  Today's Date: 4/25/2024    Time Calculation  Start Time: 0830  Stop Time: 0913  Time Calculation (min): 43 min    Visit #: 6 out of 10 goals, 20 LTGCert sent 4/4-7/3/24 and signed 4/4  MEDICARE A/B - NO AUTH / [place]IS[place] /   Evaluation date: 4/4/24    Current Problem:   1. Stiffness of right knee        2. Acute postoperative pain of right knee  Follow Up In Physical Therapy        Post op 6 weeks  SUBJECTIVE:   Pt denies chills or fe  Feels he may have strained his muscle some discomfort kicking up his leg, ok after last session, pt reports having a hx of similar pain after he tripped in his garage 3 years ago  and every once in while flares up. Not sure what he did, possibly climbing his stairs at home.   Pt not having any pain walking      Precautions: no kneel or deep squatPast Medical History: Hypertension, dyslipidemia, vitamin D deficiency rheumatoid arthritis,hernia RT inguinal,Sciatica, GERD         Pain:   Start of session: slight quad 1/10       OBJECTIVE:  RT knee  Some discomfort  RT quad lateral with LAQ improved post warm up RT and with hip flexion  Negative homans   Incision still scabbed top portion no drainage seen, (per pt had some drainage but not yellow or green noted last night)    RT knee/LE  is more swollen vs left pt states this is long standing not just since TKA  1-120  AAROM heel slide with strap  in long sit  Incision still has some areas of scabbing and knee is still swollen  Treatments:  Therapeutic Exercise: ( 42 minutes)RT post surgical    Seated:  Heel slide with towel 2x13 LT/RT no pain   LAQ 2 x 10/Left only only     Standing:  at bar with SA support as needed:  -SLR   left/right into abd  2 x 13, extension tap 2 x 13  -Heel raises 2 x 14  -March 2 x 13 deferred   -Half tandem stance with slow head turns L/R 2 x 5  -DLS feet together balance with one eye closed 30 sec ea  -Side step  x6  -Backward step x2  Sit to stand from elevated mat 3 x 6 DNP  -RT ham curl 2x13    Supine:  - low lift bridge  2 x 10  SAQ 2 x 10/0# RT-not performed/LT  QS x10 towel roll Lt/Rt no pain   SLR right x10 RT- held /LT x10  Side lying hip abduction 2x10 left only, RT standing 2x10   Nustep UE/LE L1 for 5 min ,seat at 11      Manual Therapy: ( minutes)    Gait Training: ( minutes)    Neuromuscular Re-education: ( minutes)     HEP:2x day as tolerated issued with home PT   Seated: , heel/toe raises x10,heel slides x10, hip flexion-stand, LAQ  Stand: x10 ea:ham curl alternate, hip abd SLR, hip extension, heel raises, march  Supine: x10 quad sets/5 sec-small towel roll, gluteal sets/5 sec, SAQ,ankle PF/DF   add hip supine SLR 2x5 and hip abduction on side x10 with good quad set, 3x week    ASSESSMENT:    Possible quad strain modified program today to rest muscle  Pt will consult with MD salcedo still having some drainage and his continued swelling of knee  for direction.  Drainage is not yellow or green.  Discussed wear looser pants to avoid rubbing on incision cover if it opens up.       Post session pain: better 0/10     PLAN:  Pt to hold carla livier that is painful especially LAQ ,SAQ march and SLR for hep until RT thigh feels better  Pt will let PT know how MD consult went    OP PT PLAN:  Treatment/Interventions: Cryotherapy , Education/Instruction , Gait training , Manual Therapy  , Neuromuscular re-education , Self care/home management , Therapeutic activities , and Therapeutic exercise    PT Plan: Skilled PT   PT Frequency: 2 times per week   Duration:10  Certification Period Start Date:   Certification Period End Date:   Visits Approved:  [facility] A/[facility] - [place] AUTH / [place] / [place]115.78 USED 2024 PT/[facility] SUPP  4/4/24   Rehab Potential: Excellent  Plan of Care Agreement: Patient         Goals:   STG 8  Pt will be independent with HEP  Increase knee RT ROM 0-115 so pt can sit to stand with ease  Increase quad strength  to SLR with no lag  Decreased swelling 0-.5/cm, healed incision     LTG 16  Function to increase based on WOMAC scores improve 10%, now 19%  Strength improve 1 MMT, perform stairs step over step  Improved ability to sleep and have full knee extension  Pt to walk community distances without increased pain

## 2024-04-27 NOTE — PROGRESS NOTES
" Katie Ornelas MD   Adult Reconstruction and Joint Replacement Surgery  Phone: [phone]     Fax: [phone]     FOLLOW UP      Name: Kun Castle Jr  : 1945  Date of Visit: 2024    Procedure: Right Total knee replacement  Date: 3/12/2024    Chief Complaint: Right Total knee replacement surgery follow-up    History of Present Illness:  The patient is now 6 weeks 6 days out from right Total knee replacement surgery.     The patient has no pain.    Currently taking nothing or occasional tylenol for pain.     The patient has low stiffness.     Patient is walking with nothing for assistive device.    The patient goes up and down stairs with single leg negotiation .    Patient can walk 6 blocks.    The patient is doing outpatient physical therapy.    No fevers or drainage from the incision.    There are no concerns.    The patient is mildly limited.     Physical Exam:  The patient is well appearing, alert and oriented to person place and time.    The incision is well healed. There is no sign of wound complication. 3 spots of mild possible stitch abscess with no opening, active drainage (just darker coloring).     Range of Motion: full extension to 120 degrees of flexion.    There is no extensor lag.    There is a small effusion in the knee.    There is no instability. The knee is stable to varus-valgus stress and anterior-posterior stress.     Homans sign is negative.    Neurologic, and vascular examinations are normal.    PROMs   24   \"KOOSJR. Score\" 42.28       Imaging:    X-rays were personally reviewed today and show implants in good position with no evidence of complication.    Impression and Plan:    78 y.o. male 6 weeks 6 days from right Total knee replacement.    The patient is doing well following Total knee replacement surgery.  Antibiotic prophylaxis prior to dental procedures were reviewed.  Long term failure mechanisms were reviewed. Discussed importance of long term " follow up. A new physical therapy prescription was given to the patient, and we reviewed exercises to be performed at home to work on improving range of motion and strength. The patient was asked to contact the office sooner if there are any concerns. Their questions were answered.     RTC: 3 months     X-rays at next visit: Postop TKA series    _____________________  Katie Ornelas MD  [facility]

## 2024-04-28 NOTE — PROGRESS NOTES
" Katie Ornelas MD   Adult Reconstruction and Joint Replacement Surgery  Phone: [phone]     Fax: [phone]     FOLLOW UP      Name: Kun Castle Jr  : 1945  Date of Visit: 2024    Procedure: Right Total knee replacement  Date: 3/12/2024    Chief Complaint: Right Total knee replacement surgery follow-up    History of Present Illness:  The patient is now 8 weeks 6 days out from right Total knee replacement surgery.     The patient has {PAIN:08310}.    Currently taking *** for pain.     The patient has {stiff:15057::\"low\"} stiffness.     Patient is walking with {assistive device:80573} for assistive device.    The patient goes up and down stairs {stairs:61872}.    Patient can walk {Blocks:34130}.    The patient is doing {home; outpatient:35063} physical therapy.    No fevers or drainage from the incision.    There are no concerns.    The patient {activity:43777}.     Physical Exam:  The patient is well appearing, alert and oriented to person place and time.    The incision is well healed. There is no sign of wound complication.    Range of Motion: {rom3:83148::\"20\",\"15\",\"10\",\"5\",\"full extension\"} to {rom4:11174::\"less than 90\",\"90\",\"100\",\"110\",\"120\"} degrees of flexion.    There is no extensor lag.    There is {Blank single:50738::\"no\",\"a small\",\"a large\",\"a moderate\"} effusion in the knee.    There is no instability. The knee is stable to varus-valgus stress and anterior-posterior stress.     Homans sign is negative.    Neurologic, and vascular examinations are normal.    PROMs   24   \"KOOSJR. Score\" 42.28       Imaging:    X-rays were personally reviewed today and show implants in good position with no evidence of complication.    Impression and Plan:    78 y.o. male 8 weeks 6 days from right Total knee replacement.    The patient is doing well following Total knee replacement surgery.  Antibiotic prophylaxis prior to dental procedures were reviewed.  Long term failure " mechanisms were reviewed. Discussed importance of long term follow up. A new physical therapy prescription was given to the patient, and we reviewed exercises to be performed at home to work on improving range of motion and strength. The patient was asked to contact the office sooner if there are any concerns. Their questions were answered.     RTC: 1 year     X-rays at next visit: Postop TKA series    _____________________  Katie Ornelas MD  [facility]

## 2024-04-29 ENCOUNTER — OFFICE VISIT (OUTPATIENT)
Dept: ORTHOPEDIC SURGERY | Facility: HOSPITAL | Age: 79
End: 2024-04-29
Payer: MEDICARE

## 2024-04-29 ENCOUNTER — HOSPITAL ENCOUNTER (OUTPATIENT)
Dept: RADIOLOGY | Facility: HOSPITAL | Age: 79
Discharge: HOME | End: 2024-04-29
Payer: MEDICARE

## 2024-04-29 DIAGNOSIS — Z96.651 S/P TOTAL KNEE ARTHROPLASTY, RIGHT: ICD-10-CM

## 2024-04-29 PROCEDURE — 99024 POSTOP FOLLOW-UP VISIT: CPT | Performed by: STUDENT IN AN ORGANIZED HEALTH CARE EDUCATION/TRAINING PROGRAM

## 2024-04-29 PROCEDURE — 1159F MED LIST DOCD IN RCRD: CPT | Performed by: STUDENT IN AN ORGANIZED HEALTH CARE EDUCATION/TRAINING PROGRAM

## 2024-04-29 PROCEDURE — 73562 X-RAY EXAM OF KNEE 3: CPT | Mod: RIGHT SIDE | Performed by: RADIOLOGY

## 2024-04-29 PROCEDURE — 73562 X-RAY EXAM OF KNEE 3: CPT | Mod: RT

## 2024-04-30 ENCOUNTER — TREATMENT (OUTPATIENT)
Dept: PHYSICAL THERAPY | Facility: CLINIC | Age: 79
End: 2024-04-30
Payer: MEDICARE

## 2024-04-30 DIAGNOSIS — G89.18 ACUTE POSTOPERATIVE PAIN OF RIGHT KNEE: ICD-10-CM

## 2024-04-30 DIAGNOSIS — M25.661 STIFFNESS OF RIGHT KNEE: Primary | ICD-10-CM

## 2024-04-30 DIAGNOSIS — M25.561 ACUTE POSTOPERATIVE PAIN OF RIGHT KNEE: ICD-10-CM

## 2024-04-30 PROCEDURE — 97110 THERAPEUTIC EXERCISES: CPT | Mod: GP | Performed by: PHYSICAL THERAPIST

## 2024-04-30 NOTE — PROGRESS NOTES
Physical Therapy    Physical Therapy Treatment    Patient Name: Kun Castle Jr  MRN: [id number]  Today's Date: 4/30/2024         Visit #: 7 out of 10 goals, 20 LTGCert sent 4/4-7/3/24 and signed 4/4  MEDICARE A/B - NO AUTH / [place]IS[place] /   Evaluation date: 4/4/24    Current Problem:   1. Stiffness of right knee        2. Acute postoperative pain of right knee  Follow Up In Physical Therapy        Post op 7 weeks  SUBJECTIVE:   Pt saw his surgeon and felt drainage superficial no infection, she looked at his swelling told him could take a year to go down.   Mild soreness of thigh but better, no worse after last session. XR taken and she will call pt with his results, to see pt at 3 mo zen, June 17th. Otherwise she felt pt was doing pretty good.  Pt states he feels his leg is more ER at foot, asked his surgeon about it.    Past   Feels he may have strained his muscle some discomfort kicking up his leg, ok after last session, pt reports having a hx of similar pain after he tripped in his garage 3 years ago  and every once in while flares up. Not sure what he did, possibly climbing his stairs at home.   Pt not having any pain walking      Precautions: no kneel or deep squatPast Medical History: Hypertension, dyslipidemia, vitamin D deficiency rheumatoid arthritis,hernia RT inguinal,Sciatica, GERD         Pain:   Start of session: slight quad 1/10       OBJECTIVE:  RT knee  Able to extend knee today without pain LAQ    Incision still scabbed top portion no drainage seen, more closed today  Knee is still swollen   1-120  AAROM heel slide with strap  in long sit    Treatments:  Therapeutic Exercise: ( 43 minutes)RT post surgical    Seated:  Heel slide with towel 2x13 LT/RT no pain   LAQ 2 x 12/Left only only     Standing:  at bar with SA support as needed:  -SLR   left/right into abd  2 x 13, extension tap 2 x 13  -Heel raises 2 x 15  -March 2 x 13 deferred   -Half tandem stance with slow head turns L/R 2 x 5  -DLS feet  together balance with one eye closed 30 sec ea, both eyes closed  -Side step x6  -Backward step x4  Sit to stand from elevated mat 3 x 6 DNP  -RT ham curl 2x15    Supine:  - low lift bridge  2 x 12  SAQ 2 x 12 LT/0# RT 10 x  QS x15 towel roll Lt/Rt no pain   SLR right x5 RT active assist - /LT x10  Side lying hip abduction 2x12 left only, RT standing 2x12   Nustep UE/LE L1 for 6 min ,seat at 11      Manual Therapy: ( minutes)    Gait Training: ( minutes)    Neuromuscular Re-education: ( minutes)     HEP:2x day as tolerated issued with home PT   Seated: , heel/toe raises x10,heel slides x10, hip flexion-stand, LAQ  Stand: x10 ea:ham curl alternate, hip abd SLR, hip extension, heel raises, march  Supine: x10 quad sets/5 sec-small towel roll, gluteal sets/5 sec, SAQ,ankle PF/DF   add hip supine SLR 2x5 and hip abduction on side x10 with good quad set, 3x week    ASSESSMENT:   Pt's quad strain appears to be improving resumed some livier without c/o pain on RT   Suggested pt hold on golfing and mowing lawn until further out  Post session pain:  good     PLAN:  Pt to hold carla livier that is painful especially LAQ , march and SLR for hep until RT thigh feels better  Gaol progression    OP PT PLAN:  Treatment/Interventions: Cryotherapy , Education/Instruction , Gait training , Manual Therapy  , Neuromuscular re-education , Self care/home management , Therapeutic activities , and Therapeutic exercise    PT Plan: Skilled PT   PT Frequency: 2 times per week   Duration:10  Certification Period Start Date:   Certification Period End Date:   Visits Approved:  MNMEDICARE A/B - NO AUTH / [place] / $115.78 USED 2024 PT/[facility]  SUPP  4/4/24   Rehab Potential: Excellent  Plan of Care Agreement: Patient         Goals:   STG 8  Pt will be independent with HEP>>A  Increase knee RT ROM 0-115 so pt can sit to stand with ease>>A  Increase quad strength to SLR with no lag  Decreased swelling 0-.5/cm, healed incision     LTG 16  Function to  increase based on WOMAC scores improve 10%, now 19%  Strength improve 1 MMT, perform stairs step over step  Improved ability to sleep and have full knee extension  Pt to walk community distances without increased pain

## 2024-05-02 ENCOUNTER — TREATMENT (OUTPATIENT)
Dept: PHYSICAL THERAPY | Facility: CLINIC | Age: 79
End: 2024-05-02
Payer: MEDICARE

## 2024-05-02 DIAGNOSIS — M25.661 STIFFNESS OF RIGHT KNEE: Primary | ICD-10-CM

## 2024-05-02 DIAGNOSIS — M25.561 ACUTE POSTOPERATIVE PAIN OF RIGHT KNEE: ICD-10-CM

## 2024-05-02 DIAGNOSIS — G89.18 ACUTE POSTOPERATIVE PAIN OF RIGHT KNEE: ICD-10-CM

## 2024-05-02 PROCEDURE — 97110 THERAPEUTIC EXERCISES: CPT | Mod: GP,KX | Performed by: PHYSICAL THERAPIST

## 2024-05-02 NOTE — PROGRESS NOTES
Physical Therapy    Physical Therapy Treatment AND RE EVAL RE CERT    Patient Name: Kun Castle Jr  MRN: [id number]  Today's Date: 5/2/2024    Time Calculation  Start Time: 0830  Stop Time: 0915  Time Calculation (min): 45 min    Visit #: 8 out of 10 goals, 20 LTG  Cert 1,sent 4/4-7/3/24 and signed 4/4, cert #2 sent 5/2/24-7/31/24  MEDICARE A/B - NO AUTH / [place] VISITS /   Evaluation date: 4/4/24    Current Problem:   1. Stiffness of right knee        2. Acute postoperative pain of right knee  Follow Up In Physical Therapy        Post op 7+ weeks  SUBJECTIVE:   Pt denies pain, more stiffness, did well with last session and feels RT thigh is doing better able to lift leg to get on seat today without pain. Walked at [employer] about 4 laps and had to rest as left knee was aching, Rt did ok.    Past session  Per pt: Pt saw his surgeon and felt drainage superficial no infection, she looked at his swelling told him could take a year to go down.   Mild soreness of thigh but better, no worse after last session. XR taken and she will call pt with his results, to see pt at 3 mo zen, June 17th. Otherwise she felt pt was doing pretty good.  Pt states he feels his leg is more ER at foot, asked his surgeon about it.    Past   Feels he may have strained his muscle some discomfort kicking up his leg, ok after last session, pt reports having a hx of similar pain after he tripped in his garage 3 years ago  and every once in while flares up. Not sure what he did, possibly climbing his stairs at home.   Pt not having any pain walking      Precautions: no kneel or deep squatPast Medical History: Hypertension, dyslipidemia, vitamin D deficiency rheumatoid arthritis,hernia RT inguinal,Sciatica, GERD         Pain:   Start of session: 0       OBJECTIVE:  RT knee  Able to extend knee today without pain LAQ  Incision still scabbed top portion no drainage seen,decreasing in size  Knee is still swollen, 47cm but decreasing   0-120  AAROM  heel slide tight vs pain   WOMAC 14.5% 14/96  Treatments:  Therapeutic Exercise: ( 42  minutes)RT post surgical    Seated:  Heel slide with towel 2x13 LT/RT no pain   LAQ 2 x 14/Left only only, RT leg      Standing:  at bar with SA support as needed:  -SLR   left/right into abd  2 x 15, extension tap 2 x 15  -Heel raises 2 x 15  -calf stretch rocker board x5/10 sec   -March 2 x 13 deferred   -Half tandem stance with slow head turns L/R 2 x 5  -DLS feet together balance with one eye closed 30 sec ea, both eyes closed  -Side step x8  -Backward step x4  Sit to stand from elevated mat 3 x 6 DNP  -RT ham curl 2x10/1#  -step up RT forward x10 +HR 2 inch     Supine:  - low lift bridge  2 x 14  SAQ 2 x 12 LT/0# RT 10 x2  Heel slide x10 RT   QS x15 towel roll Lt/Rt no pain   SLR right x7 RT active/AA- /LT x12 no assist  Nustep UE/LE L2 for 7 min ,seat at 11      Manual Therapy: ( minutes)    Gait Training: ( minutes)    Neuromuscular Re-education: ( minutes)     HEP:2x day as tolerated issued with home PT   Seated: , heel/toe raises x10,heel slides x10, hip flexion-stand, LAQ  Stand: x10 ea:ham curl alternate, hip abd SLR, hip extension, heel raises, march  Supine: x10 quad sets/5 sec-small towel roll, gluteal sets/5 sec, SAQ,ankle PF/DF   add hip supine SLR 2x5 and hip abduction on side x10 with good quad set, 3x week    ASSESSMENT:   Pt with good rom and improving strength able to do 2 inch step up without difficulty or pain.    Pt's hip flexor/quad strain appears to be improving resumed some livier without c/o pain on RT.   Suggested pt hold on golfing and mowing lawn until further out better endurance, strength and balance.    Goal progression see below   Post session pain:  good     PLAN:  Continue POC up to 16 sessions 2x 4 weeks as needed    Resume gradually quad hip flexor work as tolerated.  Increase step to 4 inch.  Goal progression    OP PT PLAN:  Treatment/Interventions: Cryotherapy , Education/Instruction , Gait  training , Manual Therapy  , Neuromuscular re-education , Self care/home management , Therapeutic activities , and Therapeutic exercise    PT Plan: Skilled PT   PT Frequency: 2 times per week   Duration:10  Certification Period Start Date:   Certification Period End Date:   Visits Approved:  MNMEDICARE A/B - NO AUTH / [place] / $115.78 USED 2024 PT/[facility] SU[facility]  4/4/24   Rehab Potential: Excellent  Plan of Care Agreement: Patient         Goals:   STG 8  Pt will be independent with HEP>>A  Increase knee RT ROM 0-115 so pt can sit to stand with ease>>A  Increase quad strength to SLR with no lag>>A but recent strain deferred active alone for few days   Decreased swelling 0-.5/cm>>>A, healed incision>>on going     LTG 16  Function to increase based on WOMAC 19% scores improve 10%, at 14% now    Strength improve 1 MMT, perform stairs step over step>>progressing   Improved ability to sleep and have full knee extension>>>A  Pt to walk community distances without increased pain>>>progressing

## 2024-05-07 ENCOUNTER — TREATMENT (OUTPATIENT)
Dept: PHYSICAL THERAPY | Facility: CLINIC | Age: 79
End: 2024-05-07
Payer: MEDICARE

## 2024-05-07 DIAGNOSIS — M25.561 ACUTE POSTOPERATIVE PAIN OF RIGHT KNEE: Primary | ICD-10-CM

## 2024-05-07 DIAGNOSIS — G89.18 ACUTE POSTOPERATIVE PAIN OF RIGHT KNEE: Primary | ICD-10-CM

## 2024-05-07 DIAGNOSIS — M25.661 STIFFNESS OF RIGHT KNEE: ICD-10-CM

## 2024-05-07 PROCEDURE — 97110 THERAPEUTIC EXERCISES: CPT | Mod: GP,KX | Performed by: PHYSICAL THERAPIST

## 2024-05-07 NOTE — PROGRESS NOTES
Physical Therapy    Physical Therapy Treatment     Patient Name: Kun Castle Jr  MRN: [id number]  Today's Date: 5/7/2024    Time Calculation  Start Time: 0700  Stop Time: 0738  Time Calculation (min): 38 min    Visit #: 9 out of 10 goals, 20 LTG  Cert 1,sent 4/4-7/3/24 and signed 4/4, cert #2 sent 5/2/24-7/31/24  MEDICARE A/B - NO AUTH / [place] VISITS /   Evaluation date: 4/4/24    Current Problem:   1. Acute postoperative pain of right knee  Follow Up In Physical Therapy      2. Stiffness of right knee            Post op 8 weeks  SUBJECTIVE:   Pt denies pain, more stiffness, did well with last session and feels RT thigh is doing better no longer  painful.    Past   Per pt: Pt saw his surgeon and felt drainage superficial no infection, she looked at his swelling told him could take a year to go down.   Mild soreness of thigh but better, no worse after last session. XR taken and she will call pt with his results, to see pt at 3 mo zen, June 17th. Otherwise she felt pt was doing pretty good.  Pt states he feels his leg is more ER at foot, asked his surgeon about it.       Precautions: no kneel or deep squat  Past Medical History: Hypertension, dyslipidemia, vitamin D deficiency rheumatoid arthritis,hernia RT inguinal,Sciatica, GERD         Pain:   Start of session: 0       OBJECTIVE:  RT knee  Able to extend knee today without pain LAQ  Incision still scabbed top portion no drainage seen,decreasing in size  Knee is still swollen, 47cm but decreasing   0-120  AAROM heel slide tight vs pain   WOMAC 14.5% 14/96  Treatments:  Therapeutic Exercise: (  minutes)RT post surgical    Seated:  Heel slide with towel 2x13 LT/RT no pain   LAQ 2 x 15/0#    Sit to stand off Nustep    Standing:  at bar with SA support as needed:  -SLR   left/right into abd  2 x 15, extension tap 2 x 15  -Heel raises 2 x 15  -calf stretch rocker board x5/15 sec   -March x10  -Half tandem stance with slow head turns L/R 2 x 5  -DLS feet together both  eyes closed (+ mod sway)  -Side step x8  -Backward step x4  -hip abd SLR x10 RT    -RT ham curl 2x13/1#  -step up RT forward x10 +HR 4 inch     Supine:  - low lift bridge  2 x 15    Heel slide 2x10 RT (120) deg  QS 2x10 towel roll Lt/Rt no pain   SLR right x8 RT active- /LT 2x8  Nustep UE/LE L2 for 7 min ,seat at 11      Manual Therapy: ( minutes)    Gait Training: ( minutes)    Neuromuscular Re-education: ( minutes)     HEP:2x day as tolerated issued with home PT   Seated: , heel/toe raises x10,heel slides x10, hip flexion-stand, LAQ  Stand: x10 ea:ham curl alternate, hip abd SLR, hip extension, heel raises, march  Supine: x10 quad sets/5 sec-small towel roll, gluteal sets/5 sec, SAQ,ankle PF/DF   add hip supine SLR 2x5 and hip abduction on side x10 with good quad set, 3x week    ASSESSMENT:   Pt improving strength able to do 4 inch step up without difficulty or pain.  Pt's incision is looking better scab pushing up and smaller.      Goal progression see below   Post session pain:  good     PLAN:  Continue POC up to 16 sessions 2x 4 weeks as needed    Resume gradually quad hip flexor work as tolerated.  Consider lateral step up, mini squats see how contra leg feels as well, resisted LAQ  Goal progression    OP PT PLAN:  Treatment/Interventions: Cryotherapy , Education/Instruction , Gait training , Manual Therapy  , Neuromuscular re-education , Self care/home management , Therapeutic activities , and Therapeutic exercise    PT Plan: Skilled PT   PT Frequency: 2 times per week   Duration:10  Certification Period Start Date:   Certification Period End Date:   Visits Approved:  MNMEDICARE A/B - NO AUTH / [place]ISITS / $115.78 USED 2024 PT/[facility]  SUPP  4/4/24   Rehab Potential: Excellent  Plan of Care Agreement: Patient         Goals:   STG 8  Pt will be independent with HEP>>A  Increase knee RT ROM 0-115 so pt can sit to stand with ease>>A  Increase quad strength to SLR with no lag>>A   Decreased swelling  0-.5/cm>>>A, healed incision>>on going     LTG 16  Function to increase based on WOMAC 19% scores improve 10%, at 14% now    Strength improve 1 MMT, perform stairs step over step>>progressing   Improved ability to sleep and have full knee extension>>>A  Pt to walk community distances without increased pain>>>progressing

## 2024-05-09 ENCOUNTER — TREATMENT (OUTPATIENT)
Dept: PHYSICAL THERAPY | Facility: CLINIC | Age: 79
End: 2024-05-09
Payer: MEDICARE

## 2024-05-09 DIAGNOSIS — G89.18 ACUTE POSTOPERATIVE PAIN OF RIGHT KNEE: ICD-10-CM

## 2024-05-09 DIAGNOSIS — M25.561 ACUTE POSTOPERATIVE PAIN OF RIGHT KNEE: ICD-10-CM

## 2024-05-09 DIAGNOSIS — M25.661 STIFFNESS OF RIGHT KNEE: Primary | ICD-10-CM

## 2024-05-09 PROCEDURE — 97110 THERAPEUTIC EXERCISES: CPT | Mod: KX,GP | Performed by: PHYSICAL THERAPIST

## 2024-05-09 NOTE — PROGRESS NOTES
Physical Therapy    Physical Therapy Treatment     Patient Name: Kun Castle Jr  MRN: [id number]  Today's Date: 5/9/2024    Time Calculation  Start Time: 0910  Stop Time: 0952  Time Calculation (min): 42 min    Visit #: 10 out of 10 goals, 20 LTG  Cert 1,sent 4/4-7/3/24 and signed 4/4, cert #2 sent 5/2/24-7/31/24  MEDICARE A/B - NO AUTH / [place] VISITS /   Evaluation date: 4/4/24    Current Problem:   1. Stiffness of right knee        2. Acute postoperative pain of right knee  Follow Up In Physical Therapy          Post op 8+ weeks  SUBJECTIVE:   Pt denies pain, more stiffness, did well with last session and feels RT thigh is doing better no longer painful.    Past   Per pt: Pt saw his surgeon and felt drainage superficial no infection, she looked at his swelling told him could take a year to go down.   Mild soreness of thigh but better, no worse after last session. XR taken and she will call pt with his results, to see pt at 3 mo zen, June 17th. Otherwise she felt pt was doing pretty good.  Pt states he feels his leg is more ER at foot, asked his surgeon about it.       Precautions: no kneel or deep squat  Past Medical History: Hypertension, dyslipidemia, vitamin D deficiency rheumatoid arthritis,hernia RT inguinal,Sciatica, GERD         Pain:   Start of session: 0       OBJECTIVE:  RT knee  Able to extend knee today without pain LAQ  Incision still scabbed top portion no drainage seen,decreasing in size  Knee is still swollen, 47cm but decreasing   0-120  AAROM heel slide tight vs pain   WOMAC 14.5% 14/96  Treatments:  Therapeutic Exercise: (42  minutes)RT post surgical   Nustep UE/LE L2 for 7 min ,seat at 11     Seated:  Heel slide with towel 2x13 LT/RT no pain   LAQ 2 x 10/1#    Sit to stand off raised bed     Standing:  at bar with SA support as needed:  -SLR   left/right into abd  2 x 15, extension tap 2 x 15  -Heel raises 2 x 15  -calf stretch slant board x3/15 sec   -March x12 hand above rail  -Half tandem  stance with slow head turns L/R 2 x 5, eye closed 1  -DLS feet together both eyes closed (+ mod sway)  -Side step x8  -Backward step x5    -RT ham curl 2x14/1#  -step up RT forward x15 +HR 4 inch  -lateral step up x10 /2 inch     Supine:  - low lift bridge  2 x 15    Heel slide 2x10 RT (120) deg  QS 2x10 towel roll Lt/Rt no pain   SLR right x10 RT active- /LT 2x10      Manual Therapy: ( minutes)    Gait Training: ( minutes)    Neuromuscular Re-education: ( minutes)     HEP:2x day as tolerated issued with home PT   Seated: , heel/toe raises x10,heel slides x10, hip flexion-stand, LAQ  Stand: x10 ea:ham curl alternate, hip abd SLR, hip extension, heel raises, march  Supine: x10 quad sets/5 sec-small towel roll, gluteal sets/5 sec, SAQ,ankle PF/DF   add hip supine SLR 2x5 and hip abduction on side x10 with good quad set, 3x week    ASSESSMENT:   Pt improving strength able to do 4 inch step up without difficulty or pain.  Pt's incision is looking better scab pushing up and smaller. Pt did well with progressions to light wt.    Progressing towards goals long term.    Goal progression see below   Post session pain:  good     PLAN:  Continue POC up to 16 sessions 2x 4 weeks as needed    Resume gradually quad hip flexor work as tolerated.  Consider lateral step up, mini squats see how contra leg feels as well    OP PT PLAN:  Treatment/Interventions: Cryotherapy , Education/Instruction , Gait training , Manual Therapy  , Neuromuscular re-education , Self care/home management , Therapeutic activities , and Therapeutic exercise    PT Plan: Skilled PT   PT Frequency: 2 times per week   Duration:10  Certification Period Start Date:   Certification Period End Date:   Visits Approved:  MNMEDICARE A/B - NO AUTH / [place] / $115.78 USED 2024 PT/[facility] SUPP  4/4/24   Rehab Potential: Excellent  Plan of Care Agreement: Patient         Goals:   STG 8  Pt will be independent with HEP>>A  Increase knee RT ROM 0-115 so pt can sit  to stand with ease>>A  Increase quad strength to SLR with no lag>>A   Decreased swelling 0-.5/cm>>>A, healed incision>>on going>>improving      LTG 16  Function to increase based on WOMAC 19% scores improve 10%, at 14% now    Strength improve 1 MMT, perform stairs step over step>>progressing   Improved ability to sleep and have full knee extension>>>A  Pt to walk community distances without increased pain>>>progressing

## 2024-05-13 ENCOUNTER — APPOINTMENT (OUTPATIENT)
Dept: ORTHOPEDIC SURGERY | Facility: HOSPITAL | Age: 79
End: 2024-05-13
Payer: MEDICARE

## 2024-05-13 ENCOUNTER — TREATMENT (OUTPATIENT)
Dept: PHYSICAL THERAPY | Facility: CLINIC | Age: 79
End: 2024-05-13
Payer: MEDICARE

## 2024-05-13 DIAGNOSIS — Z96.651 S/P TOTAL KNEE ARTHROPLASTY, RIGHT: Primary | ICD-10-CM

## 2024-05-13 DIAGNOSIS — M25.661 STIFFNESS OF RIGHT KNEE: ICD-10-CM

## 2024-05-13 DIAGNOSIS — M25.561 ACUTE POSTOPERATIVE PAIN OF RIGHT KNEE: Primary | ICD-10-CM

## 2024-05-13 DIAGNOSIS — G89.18 ACUTE POSTOPERATIVE PAIN OF RIGHT KNEE: Primary | ICD-10-CM

## 2024-05-13 PROCEDURE — 97110 THERAPEUTIC EXERCISES: CPT | Mod: GP,KX | Performed by: PHYSICAL THERAPIST

## 2024-05-13 NOTE — PROGRESS NOTES
Physical Therapy    Physical Therapy Treatment     Patient Name: Kun Castle Jr  MRN: [id number]  Today's Date: 5/13/2024    Time Calculation  Start Time: 1030  Stop Time: 1112  Time Calculation (min): 42 min    Visit #: 10 out of 10 goals, 20 LTG  Cert 1,sent 4/4-7/3/24 and signed 4/4, cert #2 sent 5/2/24-7/31/24  MEDICARE A/B - NO AUTH / [place] /   Evaluation date: 4/4/24    Current Problem:   1. Acute postoperative pain of right knee  Follow Up In Physical Therapy      2. Stiffness of right knee            Post op 8+ weeks  SUBJECTIVE:   Pt denies pain, more stiffness, did well with last session and feels RT thigh is doing better no longer painful.    Past   Per pt: Pt saw his surgeon and felt drainage superficial no infection, she looked at his swelling told him could take a year to go down.   Mild soreness of thigh but better, no worse after last session. XR taken and she will call pt with his results, to see pt at 3 mo zen, June 17th. Otherwise she felt pt was doing pretty good.  Pt states he feels his leg is more ER at foot, asked his surgeon about it.       Precautions: no kneel or deep squat  Past Medical History: Hypertension, dyslipidemia, vitamin D deficiency rheumatoid arthritis,hernia RT inguinal,Sciatica, GERD         Pain:   Start of session: 0       OBJECTIVE:  RT knee  Able to extend knee today without pain LAQ  Incision still scabbed top portion no drainage seen,decreasing in size  Knee is still swollen, 47cm but decreasing   0-120  AAROM heel slide tight vs pain   WOMAC 14.5% 14/96  Treatments:  Therapeutic Exercise: (42  minutes)RT post surgical   Nustep UE/LE L2 for 7 min ,seat at 11     Seated:  Heel slide with towel 2x13 LT/RT no pain   LAQ 3 x 10/1# , lT 3x10  Sit to stand off Nustep x10     Standing:  at bar with SA support as needed:  -SLR   left/right into abd  2 x 15, extension tap 2 x 15  -Heel raises 2 x 15  -calf stretch slant board x4/15 sec   -March x12 hand above rail  -Half  tandem stance with slow head turns L/R 2 x 5, eye closed 1  -DLS feet together both eyes closed (+ mod sway)  -Side step x10 no hands today   -Backward step x6  Small hurdles forward x5 x2 hurdles-1 HHA    -RT ham curl 2x15/1#  -step up RT forward x10 +HR 4 inch  -lateral step up x15 /2 inch     Supine:  - low lift bridge  2 x 15    Heel slide 2x10 RT (120) deg  QS x15 towel roll Lt/Rt no pain   SLR right x12 RT active- /LT 2x12      Manual Therapy: ( minutes)    Gait Training: ( minutes)    Neuromuscular Re-education: ( minutes)     HEP:2x day as tolerated issued with home PT   Seated: , heel/toe raises x10,heel slides x10, hip flexion-stand, LAQ  Stand: x10 ea:ham curl alternate, hip abd SLR, hip extension, heel raises, march  Supine: x10 quad sets/5 sec-small towel roll, gluteal sets/5 sec, SAQ,ankle PF/DF   add hip supine SLR 2x5 and hip abduction on side x10 with good quad set, 3x week    ASSESSMENT:   Pt improving strength able to do 4 inch step up without much assist of hands on rail.   Pt's incision is looking better scab pushing up and smaller. Pt did well with progressions to light agility with elvin walk.  Progressing towards goals long term.      Post session pain:  good     PLAN:  Continue POC up to 16 sessions 2x 4 weeks as needed    Resume gradually quad hip flexor work as tolerated.  Consider lateral step up, mini squats see how contra leg feels as well    OP PT PLAN:  Treatment/Interventions: Cryotherapy , Education/Instruction , Gait training , Manual Therapy  , Neuromuscular re-education , Self care/home management , Therapeutic activities , and Therapeutic exercise    PT Plan: Skilled PT   PT Frequency: 2 times per week   Duration:10  Certification Period Start Date:   Certification Period End Date:   Visits Approved:  [facility] A/[facility] - [place] AUTH / [place] / [place]115.78 USED 2024 PT/ST. DANNY CASTELLON SUPP  4/4/24   Rehab Potential: Excellent  Plan of Care Agreement: Patient         Goals:   STG 8  Pt will  be independent with HEP>>A  Increase knee RT ROM 0-115 so pt can sit to stand with ease>>A  Increase quad strength to SLR with no lag>>A   Decreased swelling 0-.5/cm>>>A, healed incision>>on going>>improving      LTG 16  Function to increase based on WOMAC 19% scores improve 10%, at 14% now    Strength improve 1 MMT, perform stairs step over step>>progressing   Improved ability to sleep and have full knee extension>>>A  Pt to walk community distances without increased pain>>>progressing

## 2024-05-16 ENCOUNTER — TREATMENT (OUTPATIENT)
Dept: PHYSICAL THERAPY | Facility: CLINIC | Age: 79
End: 2024-05-16
Payer: MEDICARE

## 2024-05-16 DIAGNOSIS — G89.18 ACUTE POSTOPERATIVE PAIN OF RIGHT KNEE: ICD-10-CM

## 2024-05-16 DIAGNOSIS — M25.661 STIFFNESS OF RIGHT KNEE: Primary | ICD-10-CM

## 2024-05-16 DIAGNOSIS — M25.561 ACUTE POSTOPERATIVE PAIN OF RIGHT KNEE: ICD-10-CM

## 2024-05-16 PROCEDURE — 97110 THERAPEUTIC EXERCISES: CPT | Mod: GP,KX | Performed by: PHYSICAL THERAPIST

## 2024-05-16 NOTE — PROGRESS NOTES
Physical Therapy    Physical Therapy Treatment     Patient Name: Kun Castle Jr  MRN: [id number]  Today's Date: 5/16/2024    Time Calculation  Start Time: 1130  Stop Time: 1210  Time Calculation (min): 40 min    Visit #: 11 out of 10 goals, 16 LTG  Cert 1,sent 4/4-7/3/24 and signed 4/4, cert #2 sent 5/2/24-7/31/24  MEDICARE A/B - NO AUTH / [place] /   Evaluation date: 4/4/24    Current Problem:   1. Stiffness of right knee        2. Acute postoperative pain of right knee  Follow Up In Physical Therapy          Post op 9 weeks  SUBJECTIVE:   Pt reports fleeting pain in knee, not every day and random, still numb, felt good with last rx session  Pt did step over step ascend of his stairs yesterday seemed ok, held rail  Past   Per pt: Pt saw his surgeon and felt drainage superficial no infection, she looked at his swelling told him could take a year to go down.   Mild soreness of thigh but better, no worse after last session. XR taken and she will call pt with his results, to see pt at 3 mo zen, June 17th. Otherwise she felt pt was doing pretty good.  Pt states he feels his leg is more ER at foot, asked his surgeon about it.       Precautions: no kneel or deep squat  Past Medical History: Hypertension, dyslipidemia, vitamin D deficiency rheumatoid arthritis,hernia RT inguinal,Sciatica, GERD         Pain:   Start of session: 0       OBJECTIVE:  RT knee   LAQ light resistance no issues   Incision still scabbed top portion no drainage seen,decreasing in size  Knee is still swollen, 47cm but decreasing   0-120  AAROM heel slide tight vs pain   previoius  WOMAC 14.5% 14/96  Treatments:  Therapeutic Exercise: (42  minutes)RT post surgical   Nustep UE/LE L2 for 8 min ,seat at 10     Seated:  Heel slide with towel x15 LT/RT  LAQ 2 x 10/2#   Sit to stand off raised table  2x10     Standing:  at bar with SA support as needed:  -SLR   left/right into abd  2 x 15, extension tap 2 x 15  -Heel raises 2 x 15  -calf stretch slant  board x5/15 sec   -March x15 hand above rail  -Half tandem stance with slow head turns L/R  x 5 ea, nods  -DLS feet together both eyes closed (+ mod sway)  -Side step x10 no hands today   -Backward step x6  Small hurdles forward x5 x3 hurdles-no hands , side x4 held bar     -RT ham curl 3x10/1#  -step up RT forward 2x10 +HR 4 inch  -lateral step up 2x10/2 inch     Supine:  - low lift bridge  2 x 15  QS x20 towel roll Lt/Rt no pain   SLR right x15 RT/LT      Manual Therapy: ( minutes)    Gait Training: ( minutes)    Neuromuscular Re-education: ( minutes)     HEP:2x day as tolerated issued with home PT   Seated: , heel/toe raises x10,heel slides x10, hip flexion-stand, LAQ  Stand: x10 ea:ham curl alternate, hip abd SLR, hip extension, heel raises, march  Supine: x10 quad sets/5 sec-small towel roll, gluteal sets/5 sec, SAQ,ankle PF/DF   add hip supine SLR 2x5 and hip abduction on side x10 with good quad set, 3x week    ASSESSMENT:   Pt is progressing with goals, gaining more endurance with community walking.    Pt improving strength able to do 4 inch step up without much assist of hands on rail.   Pt's incision is looking better tiny little scab noted. Pt did well with progressions to light agility work and functional task sit to stand   Progressing towards goals almost all st achieved and progress with  long term.      Post session pain:  good     PLAN:  Recheck around 6/2   Continue POC (up to 16 sessions) 2x  weeks ,as needed    Resume gradually quad hip flexor work as tolerated.     OP PT PLAN:  Treatment/Interventions: Cryotherapy , Education/Instruction , Gait training , Manual Therapy  , Neuromuscular re-education , Self care/home management , Therapeutic activities , and Therapeutic exercise    PT Plan: Skilled PT   PT Frequency: 2 times per week   Duration:10  Certification Period Start Date:   Certification Period End Date:   [facility]  [facility] [place] [facility] [place]115.78 USED 2024 PT/ST.   DANNY CASTELLON SUPP  4/4/24   Rehab Potential: Excellent  Plan of Care Agreement: Patient         Goals:   STG 8  Pt will be independent with HEP>>A  Increase knee RT ROM 0-115 so pt can sit to stand with ease>>A  Increase quad strength to SLR with no lag>>A   Decreased swelling 0-.5/cm>>>A, healed incision>>on going>>improving      LTG 16  Function to increase based on WOMAC 19% scores improve 10%, at 14% now    Strength improve 1 MMT, perform stairs step over step>>progressing   Improved ability to sleep and have full knee extension>>>A  Pt to walk community distances without increased pain>>>progressing

## 2024-05-21 ENCOUNTER — TREATMENT (OUTPATIENT)
Dept: PHYSICAL THERAPY | Facility: CLINIC | Age: 79
End: 2024-05-21
Payer: MEDICARE

## 2024-05-21 DIAGNOSIS — M25.661 STIFFNESS OF RIGHT KNEE: Primary | ICD-10-CM

## 2024-05-21 DIAGNOSIS — G89.18 ACUTE POSTOPERATIVE PAIN OF RIGHT KNEE: ICD-10-CM

## 2024-05-21 DIAGNOSIS — M25.561 ACUTE POSTOPERATIVE PAIN OF RIGHT KNEE: ICD-10-CM

## 2024-05-21 PROCEDURE — 97110 THERAPEUTIC EXERCISES: CPT | Mod: KX,GP | Performed by: PHYSICAL THERAPIST

## 2024-05-21 NOTE — PROGRESS NOTES
Physical Therapy    Physical Therapy Treatment     Patient Name: Kun Castle Jr  MRN: [id number]  Today's Date: 5/21/2024    Time Calculation  Start Time: 0815  Stop Time: 0858  Time Calculation (min): 43 min    Visit #: 12 out of 10 goals, 16 LTG  Cert 1,sent 4/4-7/3/24 and signed 4/4, cert #2 sent 5/2/24-7/31/24  MEDICARE A/B - NO AUTH / [place] /   Evaluation date: 4/4/24    Current Problem:   1. Stiffness of right knee        2. Acute postoperative pain of right knee  Follow Up In Physical Therapy          Post op 9 weeks  SUBJECTIVE:   No c/o knee pain ,felt good with last rx session  Pt did step over step ascend of his stairs yesterday seemed ok, held rail  Past   Per pt: Pt saw his surgeon and felt drainage superficial no infection, she looked at his swelling told him could take a year to go down.   Mild soreness of thigh but better, no worse after last session. XR taken and she will call pt with his results, to see pt at 3 mo zen, June 17th. Otherwise she felt pt was doing pretty good.  Pt states he feels his leg is more ER at foot, asked his surgeon about it.       Precautions: no kneel or deep squat  Past Medical History: Hypertension, dyslipidemia, vitamin D deficiency rheumatoid arthritis,hernia RT inguinal,Sciatica, GERD         Pain:   Start of session: 0       OBJECTIVE:  RT knee  Able to perform 6 inch step up     past   LAQ light resistance no issues   Incision still scabbed top portion no drainage seen,decreasing in size  Knee is still swollen, 47cm but decreasing   0-120  AAROM heel slide tight vs pain   WOMAC 14.5% 14/96    Treatments:  Therapeutic Exercise: (43  minutes)RT post surgical   Nustep UE/LE L2 for 9 min ,seat at 10, discussed hep      Seated:  Heel slide with towel x15 LT/RT  LAQ 2 x 13/2#   Sit to stand off raised table  2x12     Standing:  at bar with SA support as needed:  -SLR   left/right into abd  2 x 15, extension tap 2 x 15  -Heel raises 2 x 15  -calf stretch slant board  x5/15 sec   -March 2x10 hand above rail  -Half tandem stance with slow head turns L/R  x 5 ea, nods  -DLS feet together on foam one eye closed 30 sec ea   -Side step x10 no hands today   -Backward step x6  Small hurdles forward step over step, x5 (x3 hurdles-no hands) , side x4 held bar     -RT ham curl 2x10/2#  -step up RT forward x10 +HR 6 inch  -lateral step up 2x12/2 inch     Supine:  - low lift bridge  2 x 15  QS x20 towel roll Lt/Rt no pain   SLR right x10, x8 RT/LT      Manual Therapy: ( minutes)    Gait Training: ( minutes)    Neuromuscular Re-education: ( minutes)     HEP:2x day as tolerated issued with home PT   Seated: , heel/toe raises x10,heel slides x10, hip flexion-stand, LAQ  Stand: x10 ea:ham curl alternate, hip abd SLR, hip extension, heel raises, march  Supine: x10 quad sets/5 sec-small towel roll, gluteal sets/5 sec, SAQ,ankle PF/DF   add hip supine SLR 2x5 and hip abduction on side x10 with good quad set, 3x week    ASSESSMENT:   Pt is progressing with goals, gaining more endurance with community walking.    Pt improving strength able to do 6 inch step up without much assist of hands on rail. Quad strain appears to be improved. Pt still not doing stair descending step over step, trial step down.      Progressing towards goals almost all st achieved and progress with  long term.      Post session pain:  good     PLAN:  Consider step downs   Recheck around 6/2   Continue POC (up to 16 sessions) 2x  weeks ,as needed    Resume gradually quad hip flexor work as tolerated.     OP PT PLAN:  Treatment/Interventions: Cryotherapy , Education/Instruction , Gait training , Manual Therapy  , Neuromuscular re-education , Self care/home management , Therapeutic activities , and Therapeutic exercise    PT Plan: Skilled PT   PT Frequency: 2 times per week   Duration:10  Certification Period Start Date:   Certification Period End Date:   Visits Approved:  [facility] A/[facility] - NO AUTH / [place] / [place]115.78 USED 2024  PT/ST. DELGADO  SUPP  4/4/24   Rehab Potential: Excellent  Plan of Care Agreement: Patient         Goals:   STG 8  Pt will be independent with HEP>>A  Increase knee RT ROM 0-115 so pt can sit to stand with ease>>A  Increase quad strength to SLR with no lag>>A   Decreased swelling 0-.5/cm>>>A, healed incision>>on going>>improving      LTG 16  Function to increase based on WOMAC 19% scores improve 10%, at 14% now    Strength improve 1 MMT, perform stairs step over step>>progressing   Improved ability to sleep and have full knee extension>>>A  Pt to walk community distances without increased pain>>>progressing

## 2024-05-23 ENCOUNTER — TREATMENT (OUTPATIENT)
Dept: PHYSICAL THERAPY | Facility: CLINIC | Age: 79
End: 2024-05-23
Payer: MEDICARE

## 2024-05-23 DIAGNOSIS — M25.661 STIFFNESS OF RIGHT KNEE: Primary | ICD-10-CM

## 2024-05-23 DIAGNOSIS — M25.561 ACUTE POSTOPERATIVE PAIN OF RIGHT KNEE: ICD-10-CM

## 2024-05-23 DIAGNOSIS — G89.18 ACUTE POSTOPERATIVE PAIN OF RIGHT KNEE: ICD-10-CM

## 2024-05-23 PROCEDURE — 97110 THERAPEUTIC EXERCISES: CPT | Mod: GP,KX | Performed by: PHYSICAL THERAPIST

## 2024-05-23 NOTE — PROGRESS NOTES
Physical Therapy    Physical Therapy Treatment     Patient Name: Kun Castle Jr  MRN: [id number]  Today's Date: 5/23/2024    Time Calculation  Start Time: 1215  Stop Time: 1254  Time Calculation (min): 39 min    Visit #: 13 out of 10 goals, 16 LTG  Cert 1,sent 4/4-7/3/24 and signed 4/4, cert #2 sent 5/2/24-7/31/24  MEDICARE A/B - NO AUTH / [place] /   Evaluation date: 4/4/24    Current Problem:   1. Stiffness of right knee        2. Acute postoperative pain of right knee  Follow Up In Physical Therapy          Post op 9 weeks  SUBJECTIVE:   No c/o RT knee pain ,felt  tired post last session, good with last rx session  RT shin has some stiffness if his sits too long, but then loosens up after he walks   Lt knee can get painful   Pt reports he has felt tired but could be hot weather , no c/o SOB.  Pt did 1/2 mile walk at [facility].    Past   Per pt: Pt saw his surgeon and felt drainage superficial no infection, she looked at his swelling told him could take a year to go down.   Mild soreness of thigh but better, no worse after last session. XR taken and she will call pt with his results, to see pt at 3 mo zen, June 17th. Otherwise she felt pt was doing pretty good.  Pt states he feels his leg is more ER at foot, asked his surgeon about it.       Precautions: no kneel or deep squat  Past Medical History: Hypertension, dyslipidemia, vitamin D deficiency rheumatoid arthritis,hernia RT inguinal,Sciatica, GERD         Pain:   Start of session: 0       OBJECTIVE:  RT knee  Able to perform 6 inch step up   Incision looks better, knee slight warmth  No tenderness in shin to palpation   HR 80 and pulse 02 98     past   Knee is still swollen, 47cm but decreasing   0-120  AAROM heel slide tight vs pain   WOMAC 14.5% 14/96    Treatments:  Therapeutic Exercise: (39  minutes)RT post surgical   Nustep UE/LE L1-2 for 9 min ,seat at 10, discussed hep      Seated:  Heel slide with towel on floor x15 LT/RT  LAQ RT 2 x 13/2#    Sit to stand off raised table x15 no hands     Standing:  at bar with SA support as needed:  -SLR   left/right into abd  2 x 15, extension tap 2 x 15  -Heel raises 2 x 10  -calf stretch slant board x5/10 sec   -step tap 2x10 2 inch step   -Half tandem stance with slow head turns L/R  x 5 ea, nods, feet together one eye closed, both eyes closed  -Side step x6 no hands   -Backward step x6  Small hurdles forward step over step, x4 (x3 hurdles to no hands contact guard) , side x4 held bar     -RT ham curl 2x12/2#  -step up RT forward x10 +HR 6 inch  Step down 2 inch eccentric lower x10 held hand rail   -lateral step up x12/2 inch DNP    Supine:  - low lift bridge  2 x 10  QS x20 towel roll Lt/Rt    SLR x10 RT/LTx12      Manual Therapy: ( minutes)    Gait Training: ( minutes)    Neuromuscular Re-education: ( minutes)     HEP:2x day as tolerated issued with home PT   Seated: , heel/toe raises x10,heel slides x10, hip flexion-stand, LAQ  Stand: x10 ea:ham curl alternate, hip abd SLR, hip extension, heel raises, march  Supine: x10 quad sets/5 sec-small towel roll, gluteal sets/5 sec, SAQ,ankle PF/DF   add hip supine SLR 2x5 and hip abduction on side x10 with good quad set, 3x week    ASSESSMENT:   Pt with some shin sx stiffness may be related [facility] did not have sx with today's exercises.  Progressing towards goals almost all st achieved and progress with  long term.    Poor balance with both eyes closed     Post session pain:  good     PLAN:  Consider step downs form 2 to 4 inch  Recheck around 6/2   Continue POC (up to 16 sessions) 2x  weeks ,as needed      OP PT PLAN:  Treatment/Interventions: Cryotherapy , Education/Instruction , Gait training , Manual Therapy  , Neuromuscular re-education , Self care/home management , Therapeutic activities , and Therapeutic exercise    PT Plan: Skilled PT   PT Frequency: 2 times per week   Duration:10  Certification Period Start Date:   Certification  Period End Date:   Visits Approved:  [facility]EDICARE A/B - NO AUTH / [place] / $115.78 USED 2024 PT/ST. DANNY CASTELLON SUPP  4/4/24   Rehab Potential: Excellent  Plan of Care Agreement: Patient         Goals:   STG 8  Pt will be independent with HEP>>A  Increase knee RT ROM 0-115 so pt can sit to stand with ease>>A  Increase quad strength to SLR with no lag>>A   Decreased swelling 0-.5/cm>>>A, healed incision>>on going>>improving      LTG 16  Function to increase based on WOMAC 19% scores improve 10%, at 14% now    Strength improve 1 MMT, perform stairs step over step>>progressing   Improved ability to sleep and have full knee extension>>>A  Pt to walk community distances without increased pain>>>progressing

## 2024-05-28 ENCOUNTER — TREATMENT (OUTPATIENT)
Dept: PHYSICAL THERAPY | Facility: CLINIC | Age: 79
End: 2024-05-28
Payer: MEDICARE

## 2024-05-28 DIAGNOSIS — G89.18 ACUTE POSTOPERATIVE PAIN OF RIGHT KNEE: ICD-10-CM

## 2024-05-28 DIAGNOSIS — M25.661 STIFFNESS OF RIGHT KNEE: Primary | ICD-10-CM

## 2024-05-28 DIAGNOSIS — M25.561 ACUTE POSTOPERATIVE PAIN OF RIGHT KNEE: ICD-10-CM

## 2024-05-28 PROCEDURE — 97110 THERAPEUTIC EXERCISES: CPT | Mod: GP,KX | Performed by: PHYSICAL THERAPIST

## 2024-05-28 NOTE — PROGRESS NOTES
Physical Therapy    Physical Therapy Treatment     Patient Name: Kun Castle Jr  MRN: [id number]  Today's Date: 5/28/2024    Time Calculation  Start Time: 0920  Stop Time: 1005  Time Calculation (min): 45 min    Visit #: 14 out of 10 goals, 16 LTG  Cert 1,sent 4/4-7/3/24 and signed 4/4, cert #2 sent 5/2/24-7/31/24  MEDICARE A/B - NO AUTH / [place] VIS[place] /   Evaluation date: 4/4/24    Current Problem:   1. Stiffness of right knee        2. Acute postoperative pain of right knee  Follow Up In Physical Therapy          Post op 11 weeks  SUBJECTIVE:   No c/o RT knee pain ,felt  tired post last session, good with last rx session  RT shin has some stiffness if his sits too long, but then loosens up after he walks   Lt knee can get painful   [place] of stairs step over step, some difficulty descending up good, holds rail     Past   Per pt: Pt saw his surgeon and felt drainage superficial no infection, she looked at his swelling told him could take a year to go down.   Mild soreness of thigh but better, no worse after last session. XR taken and she will call pt with his results, to see pt at 3 mo zen, June 17th. Otherwise she felt pt was doing pretty good.  Pt states he feels his leg is more ER at foot, asked his surgeon about it.       Precautions: no kneel or deep squat  Past Medical History: Hypertension, dyslipidemia, vitamin D deficiency rheumatoid arthritis,hernia RT inguinal,Sciatica, GERD         Pain:   Start of session: 0       OBJECTIVE:  RT knee  Able to perform 6 inch step up and 4 eccentric with good control   Incision looks better, knee slight warmth      past   Knee is still swollen, 47cm but decreasing   0-120  AAROM heel slide tight vs pain   WOMAC 14.5% 14/96    Treatments:  Therapeutic Exercise: (45  minutes)RT post surgical   Nustep UE/LE L2 for 10 min ,seat at 10, discussed hep      Seated:  Heel slide with towel on floor x15 LT/RT  LAQ RT 2 x 13/2#   Sit to stand off raised table slightly higher vs reg  chair x15 no hands     Standing:  at bar with SA support as needed:  -SLR   left/right into abd  2 x 15,   extension tap 2 x 15 DNP  -Heel raises 2 x 10 DNP  -calf stretch slant board x5/10 sec DNP  -step tap 2x10 2 inch step DNp  -Half tandem stance with slow head turns L/R  x 5 ea, nods, feet together one eye closed, both eyes closed  -SLB 3 trials   -Side step x10 no hands   -Backward step x10  Small hurdles forward step over step, x4 (x3 hurdles to no hands contact guard) , side x6 held bar light     -RT ham curl 2x14/2#  -step up/down  RT forward x10  +HR 6 inch  Step down 4 inch eccentric lower x10 held hand rail   Supine:  - low lift bridge  2 x 12  QS x20 towel roll Lt/Rt    SLR x12 RT/LTx12      Manual Therapy: ( minutes)    Gait Training: ( minutes)    Neuromuscular Re-education: ( minutes)     HEP:2x day as tolerated issued with home PT   Seated: , heel/toe raises x10,heel slides x10, hip flexion-stand, LAQ  Stand: x10 ea:ham curl alternate, hip abd SLR, hip extension, heel raises, march  Supine: x10 quad sets/5 sec-small towel roll, gluteal sets/5 sec, SAQ,ankle PF/DF   add hip supine SLR 2x5 and hip abduction on side x10 with good quad set, 3x week    ASSESSMENT:   Progressing towards goals almost all st achieved and progress with  long term.  Pt improving with his ability to climb stairs  Poor balance with both eyes closed , SLB a few seconds, though similar on contra sided .      Post session pain:  good     PLAN:  Recheck around 6/2 new cert , rescore his WOMAC  Continue POC (up to 16 sessions) 2x  weeks ,as needed      OP PT PLAN:  Treatment/Interventions: Cryotherapy , Education/Instruction , Gait training , Manual Therapy  , Neuromuscular re-education , Self care/home management , Therapeutic activities , and Therapeutic exercise    PT Plan: Skilled PT   PT Frequency: 2 times per week   Duration:10  Certification Period Start Date:   Certification Period End Date:   Visits Approved:  [facility]  A/B - NO AUTH / [place] / $115.78 USED 2024 PT/ST. DANNY CASTELLON SUPP  4/4/24   Rehab Potential: Excellent  Plan of Care Agreement: Patient         Goals:   STG 8  Pt will be independent with HEP>>A  Increase knee RT ROM 0-115 so pt can sit to stand with ease>>A  Increase quad strength to SLR with no lag>>A   Decreased swelling 0-.5/cm>>>A, healed incision>>on going>>improving      LTG 16  Function to increase based on WOMAC 19% scores improve 10%, at 14% now    Strength improve 1 MMT, perform stairs step over step>>progressing   Improved ability to sleep and have full knee extension>>>A  Pt to walk community distances without increased pain>>>progressing

## 2024-05-30 ENCOUNTER — TREATMENT (OUTPATIENT)
Dept: PHYSICAL THERAPY | Facility: CLINIC | Age: 79
End: 2024-05-30
Payer: MEDICARE

## 2024-05-30 DIAGNOSIS — M25.561 ACUTE POSTOPERATIVE PAIN OF RIGHT KNEE: ICD-10-CM

## 2024-05-30 DIAGNOSIS — G89.18 ACUTE POSTOPERATIVE PAIN OF RIGHT KNEE: ICD-10-CM

## 2024-05-30 DIAGNOSIS — M25.661 STIFFNESS OF RIGHT KNEE: Primary | ICD-10-CM

## 2024-05-30 PROCEDURE — 97110 THERAPEUTIC EXERCISES: CPT | Mod: GP | Performed by: PHYSICAL THERAPIST

## 2024-05-30 NOTE — PROGRESS NOTES
Physical Therapy    Physical Therapy Treatment   Recheck     Patient Name: Kun Castle Jr  MRN: [id number]  Today's Date: 5/30/2024    Time Calculation  Start Time: 1000  Stop Time: 1043  Time Calculation (min): 43 min    Visit #: 15 out of 10 goals, 16 LTG, 5/30/24, extend goals to 20 sessions   Cert 1,sent 4/4-7/3/24 and signed 4/4, cert #2 sent 5/2/24-7/31/24, cert #3 sent 5/30/24  MEDICARE A/B - NO AUTH / [place]IS[place] /   Evaluation date: 4/4/24    Current Problem:   1. Stiffness of right knee        2. Acute postoperative pain of right knee  Follow Up In Physical Therapy          Post op 11 weeks  SUBJECTIVE:     No c/o RT knee pain 0-1/10, left knee more painful astrid walk and stairs taking load 4-5/10. [facility] to [facility] due to left vs right knee pain  RT shin sx have calmed down  [place] of stairs step over step, some difficulty descending up good, left more problem.      Past   Per pt: Pt saw his surgeon and felt drainage superficial no infection, she looked at his swelling told him could take a year to go down.   Mild soreness of thigh but better, no worse after last session. XR taken and she will call pt with his results, to see pt at 3 mo zen, June 17th. Otherwise she felt pt was doing pretty good.  Pt states he feels his leg is more ER at foot, asked his surgeon about it.       Precautions: no kneel or deep squat  Past Medical History: Hypertension, dyslipidemia, vitamin D deficiency rheumatoid arthritis,hernia RT inguinal,Sciatica, GERD         Pain:   Start of session: 0-1 RT       OBJECTIVE:  RT knee  Able to perform 6 inch step up and 4 inch eccentric lower with good control   Incision looks better,slight scabbing seen, knee slight warmth    WOMAC 29/96 30% impairment some activities more limited with left knee sx  Knee rom RT 0-124  past   Knee is still swollen, 47cm but decreasing   0-120  AAROM heel slide tight vs pain    Treatments:  Therapeutic Exercise: ( 43  minutes)RT post surgical   Nustep UE/LE L2 for 10 min ,seat at 10, discussed hep     Seated:  Heel slide with towel on floor x15 LT/RT  LAQ RT 2 x 15/2#   Sit to stand off raised table slightly higher vs reg chair x15 no hands     Standing:  at bar with SA support as needed:  -SLR   left/right into abd  2 x 15,   extension tap 2 x 15 DNP  -Heel raises 2 x 10   -calf stretch slant board x5/10 sec   -step tap 2x10 4 inch  -Half tandem stance on foam 30 sec ea   -SLB 3 10 sec  -Side step x10 no hands   -Backward step x8  Small hurdles forward step over step, x4 (x3 hurdles to no hands contact guard) , side x6 held bar light     -RT ham curl 2x14/2#  -step up/down  RT forward x12  +HR 6 inch  Step down 4 inch eccentric lower x10 heel tap  held hand rail   Supine:  - low lift bridge  2 x 13  QS 2x12 towel roll Lt/Rt    SLR x14 RT/LTx12    Manual Therapy: ( minutes)    Gait Training: ( minutes)    Neuromuscular Re-education: ( minutes)     HEP:2x day as tolerated issued with home PT   Seated: , heel/toe raises x10,heel slides x10, hip flexion-stand, LAQ  Stand: x10 ea:ham curl alternate, hip abd SLR, hip extension, heel raises, march  Supine: x10 quad sets/5 sec-small towel roll, gluteal sets/5 sec, SAQ,ankle PF/DF   add hip supine SLR 2x5 and hip abduction on side x10 with good quad set, 3x week    ASSESSMENT:   Progressing towards with long term.  Pt improving with his ability to climb stairs and now more limited by the left knee vs right.   Improved LE strength with sit to stand  Progressing to unstable surface balance challenging .      Post session pain:  good     PLAN:  Continue for 2x a week for 2+ more weeks and then consider discharge   Progress balance    OP PT PLAN:  Treatment/Interventions: Cryotherapy , Education/Instruction , Gait training , Manual Therapy  , Neuromuscular re-education , Self care/home management , Therapeutic activities , and Therapeutic exercise    PT Plan: Skilled PT   PT Frequency: 2  times per week   Duration:10  Certification Period Start Date:   Certification Period End Date:   Visits Approved:  [facility] A/B - NO AUTH / [place] / [place]115.78 USED 2024 PT/[facility]  4/4/24   Rehab Potential: Excellent  Plan of Care Agreement: Patient         Goals:   STG 8  Pt will be independent with HEP>>A  Increase knee RT ROM 0-115 so pt can sit to stand with ease>>A  Increase quad strength to SLR with no lag>>A   Decreased swelling 0-.5/cm>>>A, healed incision>>on going>>improving      LTG change to 20  Function to increase based on WOMAC 19% scores improve 10%, at 30%  Strength improve 1 MMT, perform stairs step over step>>progressing able to ascend, descending more difficulty due to left vs right surgical leg  Improved ability to sleep and have full knee extension>>>A  Pt to walk community distances without increased pain>>>progressing RT vs LT

## 2024-06-04 ENCOUNTER — TREATMENT (OUTPATIENT)
Dept: PHYSICAL THERAPY | Facility: CLINIC | Age: 79
End: 2024-06-04
Payer: MEDICARE

## 2024-06-04 DIAGNOSIS — G89.18 ACUTE POSTOPERATIVE PAIN OF RIGHT KNEE: ICD-10-CM

## 2024-06-04 DIAGNOSIS — M25.661 STIFFNESS OF RIGHT KNEE: Primary | ICD-10-CM

## 2024-06-04 DIAGNOSIS — M25.561 ACUTE POSTOPERATIVE PAIN OF RIGHT KNEE: ICD-10-CM

## 2024-06-04 PROCEDURE — 97110 THERAPEUTIC EXERCISES: CPT | Mod: GP,KX | Performed by: PHYSICAL THERAPIST

## 2024-06-04 NOTE — PROGRESS NOTES
Physical Therapy    Physical Therapy Treatment   Recheck     Patient Name: Kun Castle Jr  MRN: [id number]  Today's Date: 6/4/2024    Time Calculation  Start Time: 0915  Stop Time: 1000  Time Calculation (min): 45 min    Visit #: 16 out of 10 goals, 16 LTG, 5/30/24, extend goals to 20 sessions   Cert 1,sent 4/4-7/3/24 and signed 4/4, cert #2 sent 5/2/24-7/31/24, cert #3 sent 5/30/24  MEDICARE A/B - NO AUTH / [place] VISITS /   Evaluation date: 4/4/24    Current Problem:   1. Stiffness of right knee        2. Acute postoperative pain of right knee  Follow Up In Physical Therapy          Post op 12 weeks  SUBJECTIVE:     No c/o RT knee pain 0/10, left knee more painful astrid walk and stairs taking load 2/10.   [place] of stairs step over step more at home feeling good with RT leading , some difficulty descending up good, left more problem.      Past   Per pt: Pt saw his surgeon and felt drainage superficial no infection, she looked at his swelling told him could take a year to go down.   Mild soreness of thigh but better, no worse after last session. XR taken and she will call pt with his results, to see pt at 3 mo zen, June 17th. Otherwise she felt pt was doing pretty good.  Pt states he feels his leg is more ER at foot, asked his surgeon about it.       Precautions: no kneel or deep squat  Past Medical History: Hypertension, dyslipidemia, vitamin D deficiency rheumatoid arthritis,hernia RT inguinal,Sciatica, GERD         Pain:   Start of session: 0- RT       OBJECTIVE:  RT knee  Good control with 6 inch step/down , LOB single leg stance  Past   Able to perform 6 inch step up and 4 inch eccentric lower with good control   Incision looks better,slight scabbing seen, knee slight warmth    WOMAC 29/96 30% impairment some activities more limited with left knee sx  Knee rom RT 0-124  past   Knee is still swollen, 47cm but decreasing   0-120  AAROM heel slide tight vs pain    Treatments:  Therapeutic Exercise: ( 43 minutes)RT  post surgical   Nustep UE/LE L2x 5  min, L3 x5 min  for 10 min ,seat at 11, discussed hep     Seated:  Heel slide with towel on floor x15 LT/RT  LAQ RT 3x10/2.5#   Sit to stand off raised table slightly higher vs reg chair x10 no hands     Standing:  at bar with SA support as needed:  -SLR   left/right into abd  2 x 15,   extension tap 2 x 15 DNP  -Heel raises 2 x 12  -calf stretch slant board x5/15 sec   -step tap 2x10 6 inch  -Half tandem stance on foam 30 sec ea   -SLB 3 15 sec piano to hand   -Side step x10 no hands   -Backward step x8  Small hurdles forward step over step, x4 (x3 hurdles to no hands contact guard) , side x6 held bar light     -RT ham curl 2x15/2#  -step up/down  RT forward x15  +HR 6 inch  Step down 4 inch eccentric lower x1x15 heel tap  held hand rail   Supine:  - low lift bridge  2 x 15  QS x15 ea  towel roll Lt/Rt    SLR x15 ea    Manual Therapy: ( minutes)    Gait Training: ( minutes)    Neuromuscular Re-education: ( minutes)     HEP:2x day as tolerated issued with home PT   Seated: , heel/toe raises x10,heel slides x10, hip flexion-stand, LAQ  Stand: x10 ea:ham curl alternate, hip abd SLR, hip extension, heel raises, march  Supine: x10 quad sets/5 sec-small towel roll, gluteal sets/5 sec, SAQ,ankle PF/DF   add hip supine SLR 2x5 and hip abduction on side x10 with good quad set, 3x week    ASSESSMENT:   Progressing towards with long term.  Pt improving with his ability to climb stairs and now more limited by the left knee vs right.   Improved LE strength with sit to stand  Progressing to unstable surface balance challenging  unable to SLB more than a few seconds.      Post session pain:  good     PLAN:  Continue for 2x a week for 2+ more weeks and then consider discharge   Progress balance    OP PT PLAN:  Treatment/Interventions: Cryotherapy , Education/Instruction , Gait training , Manual Therapy  , Neuromuscular re-education , Self care/home management , Therapeutic activities , and  Therapeutic exercise    PT Plan: Skilled PT   PT Frequency: 2 times per week   Duration:10  Certification Period Start Date:   Certification Period End Date:   Visits Approved:  [facility]ED[facility] A/B - NO AUTH / [place] / [place]115.78 USED 2024 PT/[facility]  4/4/24   Rehab Potential: Excellent  Plan of Care Agreement: Patient         Goals:   STG 8  Pt will be independent with HEP>>A  Increase knee RT ROM 0-115 so pt can sit to stand with ease>>A  Increase quad strength to SLR with no lag>>A   Decreased swelling 0-.5/cm>>>A, healed incision>>on going>>improving      LTG change to 20  Function to increase based on WOMAC 19% scores improve 10%, at 30%  Strength improve 1 MMT, perform stairs step over step>>progressing able to ascend, descending more difficulty due to left vs right surgical leg  Improved ability to sleep and have full knee extension>>>A  Pt to walk community distances without increased pain>>>progressing RT vs LT

## 2024-06-06 ENCOUNTER — TREATMENT (OUTPATIENT)
Dept: PHYSICAL THERAPY | Facility: CLINIC | Age: 79
End: 2024-06-06
Payer: MEDICARE

## 2024-06-06 DIAGNOSIS — M25.661 STIFFNESS OF RIGHT KNEE: Primary | ICD-10-CM

## 2024-06-06 DIAGNOSIS — M25.561 ACUTE POSTOPERATIVE PAIN OF RIGHT KNEE: ICD-10-CM

## 2024-06-06 DIAGNOSIS — G89.18 ACUTE POSTOPERATIVE PAIN OF RIGHT KNEE: ICD-10-CM

## 2024-06-06 PROCEDURE — 97110 THERAPEUTIC EXERCISES: CPT | Mod: GP,KX | Performed by: PHYSICAL THERAPIST

## 2024-06-06 NOTE — PROGRESS NOTES
Physical Therapy    Physical Therapy Treatment   Recheck     Patient Name: Kun Castle Jr  MRN: [id number]  Today's Date: 6/6/2024    Time Calculation  Start Time: 0845  Stop Time: 0926  Time Calculation (min): 41 min    Visit #: 17 out of 10 goals, 16 LTG, 5/30/24, extend goals to 20 sessions   Cert 1,sent 4/4-7/3/24 and signed 4/4, cert #2 sent 5/2/24-7/31/24, cert #3 sent 5/30/24  MEDICARE A/B - NO AUTH / [place] VISITS /   Evaluation date: 4/4/24    Current Problem:   1. Stiffness of right knee        2. Acute postoperative pain of right knee  Follow Up In Physical Therapy          Post op 12+ weeks  SUBJECTIVE:   Ok post last session  No c/o RT knee pain 0/10, left knee more painful astrid longer walk/ stairs taking 1-2/10.   [place] of stairs step over step more at home feeling good with RT leading , some difficulty descending up good, left more problem.      Past   Per pt: Pt saw his surgeon and felt drainage superficial no infection, she looked at his swelling told him could take a year to go down.   Mild soreness of thigh but better, no worse after last session. XR taken and she will call pt with his results, to see pt at 3 mo zen, June 17th. Otherwise she felt pt was doing pretty good.  Pt states he feels his leg is more ER at foot, asked his surgeon about it.       Precautions: no kneel or deep squat  Past Medical History: Hypertension, dyslipidemia, vitamin D deficiency rheumatoid arthritis,hernia RT inguinal,Sciatica, GERD         Pain:   Start of session: 0- RT       OBJECTIVE:  RT knee  Good control with 6 inch step/down , LOB single leg stance similar on contralateral side  , pain if he tries to lower on left side  Incision looks better,slight scabbing seen, knee slight warmth    Past   WOMAC 29/96 30% impairment some activities more limited with left knee sx  Knee rom RT 0-124      Treatments:  Therapeutic Exercise: ( 41 minutes)RT post surgical   Nustep UE/LE L2x 5  min, L3   for 10 min ,seat at 11,  discussed hep     Seated:  Heel slide with towel on floor x15 LT/RT  LAQ RT 3x12/2.5#   Sit to stand off raised table slightly higher vs reg chair x12 no hands     Standing:  at bar with SA support as needed:  -SLR   left/right into abd  2 x 15  extension tap 2 x 15 DNP  -Heel raises 3x10  -calf stretch slant board x5/15 sec   -step tap 2x12 6 inch  -Half tandem stance on foam 30 sec ea blinking eyes added  -SLB 3 x15 sec RT one HHA prn   -Side step x10 no hands   -Backward step x8  Small hurdles forward step over step, x5 (x3 hurdles to no hands contact guard) , side x5 held bar light at times      -RT ham curl 3x10/2#  -step up/down  RT forward x10  +HR 6 inch    Supine:  - low lift bridge  3x12  QS x15/5 ea  towel roll Lt/Rt    SLR 2x8 ea    Manual Therapy: ( minutes)    Gait Training: ( minutes)    Neuromuscular Re-education: ( minutes)     HEP:2x day as tolerated issued with home PT   Seated: , heel/toe raises x10,heel slides x10, hip flexion-stand, LAQ  Stand: x10 ea:ham curl alternate, hip abd SLR, hip extension, heel raises, march  Supine: x10 quad sets/5 sec-small towel roll, gluteal sets/5 sec, SAQ,ankle PF/DF   add hip supine SLR 2x5 and hip abduction on side x10 with good quad set, 3x week    ASSESSMENT:   Progressing towards  long term goals  Pt improving with his ability to climb stairs and now more limited by the left knee vs right.   Improved LE strength with sit to stand  Progressing to unstable surface balance challenging  unable to SLB more than a few seconds bilateral.  Pt is gaining endurance walking now doing some bigger box stores     Post session pain:  good     PLAN:  Continue 2 sessions,  consider discharge   Progress balance    OP PT PLAN:  Treatment/Interventions: Cryotherapy , Education/Instruction , Gait training , Manual Therapy  , Neuromuscular re-education , Self care/home management , Therapeutic activities , and Therapeutic exercise    PT Plan: Skilled PT   PT Frequency: 2 times  per week   Duration:10  Certification Period Start Date:   Certification Period End Date:   Visits Approved:  [facility]EDICARE A/B - NO AUTH / [place] / $115.78 USED 2024 PT/[facility]  4/4/24   Rehab Potential: Excellent  Plan of Care Agreement: Patient         Goals:   STG 8  Pt will be independent with HEP>>A  Increase knee RT ROM 0-115 so pt can sit to stand with ease>>A  Increase quad strength to SLR with no lag>>A   Decreased swelling 0-.5/cm>>>A, healed incision>>on going>>improving      LTG change to 20  Function to increase based on WOMAC 19% scores improve 10%, at 30%  Strength improve 1 MMT, perform stairs step over step>>progressing able to ascend, descending more difficulty due to left vs right surgical leg  Improved ability to sleep and have full knee extension>>>A  Pt to walk community distances without increased pain>>>progressing RT vs LT

## 2024-06-11 ENCOUNTER — TREATMENT (OUTPATIENT)
Dept: PHYSICAL THERAPY | Facility: CLINIC | Age: 79
End: 2024-06-11
Payer: MEDICARE

## 2024-06-11 DIAGNOSIS — M25.561 ACUTE POSTOPERATIVE PAIN OF RIGHT KNEE: ICD-10-CM

## 2024-06-11 DIAGNOSIS — G89.18 ACUTE POSTOPERATIVE PAIN OF RIGHT KNEE: ICD-10-CM

## 2024-06-11 DIAGNOSIS — M25.661 STIFFNESS OF RIGHT KNEE: Primary | ICD-10-CM

## 2024-06-11 PROCEDURE — 97110 THERAPEUTIC EXERCISES: CPT | Mod: GP,KX | Performed by: PHYSICAL THERAPIST

## 2024-06-11 NOTE — PROGRESS NOTES
Physical Therapy    Physical Therapy Treatment   Recheck     Patient Name: Kun Castle Jr  MRN: [id number]  Today's Date: 6/11/2024         Visit #: 18 out of 10 goals, 16 LTG, 5/30/24, extend goals to 20 sessions   Cert 1,sent 4/4-7/3/24 and signed 4/4, cert #2 sent 5/2/24-7/31/24, cert #3 sent 5/30/24  MEDICARE A/B - NO AUTH / [place] VISITS /   Evaluation date: 4/4/24    Current Problem:   1. Stiffness of right knee        2. Acute postoperative pain of right knee  Follow Up In Physical Therapy          SUBJECTIVE:   Ok post last session  No c/o RT knee pain 0/10, left knee more painful astrid longer walk/ stairs taking 1-2/10.    stairs step over step more at home feeling good with RT leading      Past   Per pt: Pt saw his surgeon and felt drainage superficial no infection, she looked at his swelling told him could take a year to go down.   Mild soreness of thigh but better, no worse after last session. XR taken and she will call pt with his results, to see pt at 3 mo zen, June 17th. Otherwise she felt pt was doing pretty good.  Pt states he feels his leg is more ER at foot, asked his surgeon about it.       Precautions: no kneel or deep squat  Past Medical History: Hypertension, dyslipidemia, vitamin D deficiency rheumatoid arthritis,hernia RT inguinal,Sciatica, GERD         Pain:   Start of session: 0- RT       OBJECTIVE:  RT knee  Good control with 6 inch step/down , LOB single leg stance similar on contralateral side  , pain if he tries to lower on left side  Incision looks better,slight scabbing seen, knee slight warmth    Past   WOMAC 29/96 30% impairment some activities more limited with left knee sx  Knee rom RT 0-124      Treatments:  Therapeutic Exercise: ( 41 minutes)RT post surgical   Nustep UE/LE L2x 5  min, L3   for 10 min ,seat at 11, discussed hep     Seated:  Heel slide with towel on floor x15 LT/RT  LAQ RT 3x12/2.5#   Sit to stand off raised table slightly higher vs reg chair x12 no hands      Standing:  at bar with SA support as needed:  -SLR   left/right into abd  2 x 15  extension tap 2 x 15 DNP  -Heel raises 3x10  -calf stretch slant board x5/15 sec   -step tap 2x12 6 inch  -Half tandem stance on foam 30 sec ea blinking eyes   -SLB 3 x15 sec RT one HHA prn   -Side step x10 no hands   -Backward step x10 at rail   Small hurdles forward step over step, x5 (x3 hurdles to no hands contact guard) , side x5 no handw today with close supervision      -RT ham curl 3x11/2#  -step up/down  RT forward x15  +HR 6 inch    Supine:  - low lift bridge  3x13  QS x15/5 ea  towel roll Lt/Rt    SLR 2x10 ea    Manual Therapy: ( minutes)    Gait Training: ( minutes)    Neuromuscular Re-education: ( minutes)     HEP:2x day as tolerated issued with home PT   Seated: , heel/toe raises x10,heel slides x10, hip flexion-stand, LAQ  Stand: x10 ea:ham curl alternate, hip abd SLR, hip extension, heel raises, march  Supine: x10 quad sets/5 sec-small towel roll, gluteal sets/5 sec, SAQ,ankle PF/DF   add hip supine SLR 2x5 and hip abduction on side x10 with good quad set, 3x week    ASSESSMENT:   Progressing towards  long term goals  Pt improving with his ability to climb stairs and now more limited by the left knee vs right.   Improved LE strength with sit to stand  RT SLB improved today    Pt is gaining endurance walking now doing some bigger box stores     Post session pain:  good     PLAN:  Continue 1 sessions,  consider discharge rescore WOMAC       OP PT PLAN:  Treatment/Interventions: Cryotherapy , Education/Instruction , Gait training , Manual Therapy  , Neuromuscular re-education , Self care/home management , Therapeutic activities , and Therapeutic exercise    PT Plan: Skilled PT   PT Frequency: 2 times per week   Duration:10  Certification Period Start Date:   Certification Period End Date:   Visits Approved:  [facility] A/B - NO AUTH / [place] / $115.78 USED 2024 PT/[facility]  4/4/24   Rehab Potential:  Excellent  Plan of Care Agreement: Patient         Goals:   STG 8  Pt will be independent with HEP>>A  Increase knee RT ROM 0-115 so pt can sit to stand with ease>>A  Increase quad strength to SLR with no lag>>A   Decreased swelling 0-.5/cm>>>A, healed incision>>on going>>improving      LTG change to 20  Function to increase based on WOMAC 19% scores improve 10%, at 30%  Strength improve 1 MMT, perform stairs step over step>>progressing able to ascend, descending more difficulty due to left vs right surgical leg  Improved ability to sleep and have full knee extension>>>A  Pt to walk community distances without increased pain>>>progressing RT vs LT

## 2024-06-13 ENCOUNTER — TREATMENT (OUTPATIENT)
Dept: PHYSICAL THERAPY | Facility: CLINIC | Age: 79
End: 2024-06-13
Payer: MEDICARE

## 2024-06-13 DIAGNOSIS — M25.661 STIFFNESS OF RIGHT KNEE: Primary | ICD-10-CM

## 2024-06-13 DIAGNOSIS — G89.18 ACUTE POSTOPERATIVE PAIN OF RIGHT KNEE: ICD-10-CM

## 2024-06-13 DIAGNOSIS — M25.561 ACUTE POSTOPERATIVE PAIN OF RIGHT KNEE: ICD-10-CM

## 2024-06-13 PROCEDURE — 97110 THERAPEUTIC EXERCISES: CPT | Mod: GP,KX | Performed by: PHYSICAL THERAPIST

## 2024-06-13 NOTE — PROGRESS NOTES
Physical Therapy    Physical Therapy Treatment   Discharge     Patient Name: Kun Castle Jr  MRN: [id number]  Today's Date: 6/13/2024    Time Calculation  Start Time: 0830  Stop Time: 0910  Time Calculation (min): 40 min    Visit #: 19 out of 10 goals, 16 LTG, 5/30/24, extend goals to 20 sessions   Cert 1,sent 4/4-7/3/24 and signed 4/4, cert #2 sent 5/2/24-7/31/24, cert #3 sent 5/30/24  MEDICARE A/B - NO AUTH / [place] VISITS /   Evaluation date: 4/4/24    Current Problem:   1. Stiffness of right knee        2. Acute postoperative pain of right knee  Follow Up In Physical Therapy          SUBJECTIVE:   Ok post last session  No c/o RT knee pain 0/10, left knee more painful [place] longer walk/ stairs taking 2/10.    stairs step over step feels stable     Precautions: no kneel or deep squat  Past Medical History: Hypertension, dyslipidemia, vitamin D deficiency rheumatoid arthritis,hernia RT inguinal,Sciatica, GERD         Pain:   Start of session: 0- RT       OBJECTIVE:  RT knee  Good control with 6 inch step/down , LOB single leg stance similar on contralateral side  Incision looks better,slight scabbing seen, knee slight warmth    MMT: RT  Quad 4+/5  Ham 4+/5  Psoas 4+/5    Past   WOMAC 29/96 30% past, today 22.9% improved  impairment some activities more limited with left knee sx  Knee rom RT 0-124      Treatments:  Therapeutic Exercise: ( 40 minutes)RT post surgical   Nustep UE/LE L2x 5  min, L3   for 10 min ,seat at 11, discussed hep     Seated:  Heel slide with towel on floor x15 LT/RT  LAQ RT 3x13/2.5#   Sit to stand off raised table slightly higher vs reg chair x12 no hands DNP     Standing:  at bar with SA support as needed:  -SLR   left/right into abd  2 x 15  extension tap 2 x 15 DNP  -Heel raises 3x10  -calf stretch slant board x5/15 sec   -Half tandem stance on foam 30 sec ea blinking eyes, one eye closed    -SLB 3 x15 sec RT one HHA prn   -Side step x10 no hands   -Backward step x10 at rail with close  S  Small hurdles forward step over step, x5 (x3 hurdles to no hands contact guard) , side x5 no hands today with close supervision      -RT ham curl 2x10/2.5#  -step up/down  RT forward x15  +HR 6 inch    Supine:  - low lift bridge 2x15  QS x15/5 ea  towel roll Lt/Rt    SLR 2x10 ea    Manual Therapy: ( minutes)    Gait Training: ( minutes)    Neuromuscular Re-education: ( minutes)     HEP:2x day as tolerated issued with home PT   Seated: , heel/toe raises x10,heel slides x10, hip flexion-stand, LAQ  Stand: x10 ea:ham curl alternate, hip abd SLR, hip extension, heel raises, march  Supine: x10 quad sets/5 sec-small towel roll, gluteal sets/5 sec, SAQ,ankle PF/DF   add hip supine SLR 2x5 and hip abduction on side x10 with good quad set, 3x week    ASSESSMENT:   Pt has good strength and rom of RT knee,  improved ability to climb stairs and now more limited by the left knee vs right.    sit to stand with equal wt bear now   RT SLB improved today  Pt is gaining endurance walking now doing some bigger box stores   Pt understands hep and is ready for discharge, functioning well  See goal achievement below   Post session pain:  good     PLAN:   discharge to hep           Goals:   STG 8  Pt will be independent with HEP>>A  Increase knee RT ROM 0-115 so pt can sit to stand with ease>>A  Increase quad strength to SLR with no lag>>A   Decreased swelling 0-.5/cm>>>A, healed incision>>A slight scab     LTG change to 20  Function to increase based on WOMAC scores improve 10%, at 22.9% not achieved but left leg limits function as well improved from last recheck.  Strength improve 1 MMT, perform stairs step over step>>PA...progressing able to ascend, descending more difficulty due to left vs right surgical leg  Improved ability to sleep and have full knee extension>>>A  Pt to walk community distances without increased pain>>>limited by left knee not Rt feels good

## 2024-06-17 ENCOUNTER — HOSPITAL ENCOUNTER (OUTPATIENT)
Dept: RADIOLOGY | Facility: HOSPITAL | Age: 79
Discharge: HOME | End: 2024-06-17
Payer: MEDICARE

## 2024-06-17 ENCOUNTER — APPOINTMENT (OUTPATIENT)
Dept: ORTHOPEDIC SURGERY | Facility: HOSPITAL | Age: 79
End: 2024-06-17
Payer: MEDICARE

## 2024-06-17 DIAGNOSIS — Z96.652 S/P TOTAL KNEE ARTHROPLASTY, LEFT: ICD-10-CM

## 2024-06-17 DIAGNOSIS — Z98.890 S/P LEFT KNEE ARTHROSCOPY: ICD-10-CM

## 2024-06-17 DIAGNOSIS — Z96.651 S/P TOTAL KNEE ARTHROPLASTY, RIGHT: Primary | ICD-10-CM

## 2024-06-17 DIAGNOSIS — Z96.651 S/P TOTAL KNEE ARTHROPLASTY, RIGHT: ICD-10-CM

## 2024-06-17 PROCEDURE — 73562 X-RAY EXAM OF KNEE 3: CPT | Mod: RIGHT SIDE | Performed by: RADIOLOGY

## 2024-06-17 PROCEDURE — 1159F MED LIST DOCD IN RCRD: CPT | Performed by: STUDENT IN AN ORGANIZED HEALTH CARE EDUCATION/TRAINING PROGRAM

## 2024-06-17 PROCEDURE — 99213 OFFICE O/P EST LOW 20 MIN: CPT | Performed by: STUDENT IN AN ORGANIZED HEALTH CARE EDUCATION/TRAINING PROGRAM

## 2024-06-17 PROCEDURE — 1125F AMNT PAIN NOTED PAIN PRSNT: CPT | Performed by: STUDENT IN AN ORGANIZED HEALTH CARE EDUCATION/TRAINING PROGRAM

## 2024-06-17 PROCEDURE — 73562 X-RAY EXAM OF KNEE 3: CPT | Mod: RT

## 2024-06-17 ASSESSMENT — PAIN - FUNCTIONAL ASSESSMENT: PAIN_FUNCTIONAL_ASSESSMENT: 0-10

## 2024-06-17 ASSESSMENT — PAIN DESCRIPTION - DESCRIPTORS: DESCRIPTORS: ACHING;SORE

## 2024-06-17 ASSESSMENT — PAIN SCALES - GENERAL: PAINLEVEL_OUTOF10: 3

## 2024-06-17 NOTE — PROGRESS NOTES
Katie Ornelas MD   Adult Reconstruction and Joint Replacement Surgery  Phone: [phone]     Fax: [phone]     FOLLOW UP      Name: Kun Castle Jr  : 1945  Date of Visit: 2024    Procedure: Right Total knee replacement  Date: 3/12/2024    Chief Complaint: Right Total knee replacement surgery follow-up    History of Present Illness:  The patient is now 3 months 5 days out from right Total knee replacement surgery.     The patient has no pain.    Currently taking nothing for pain.     The patient has low stiffness.     Patient is walking with nothing for assistive device.    The patient goes up and down stairs normally and using a rail .    Patient can walk 6 blocks.    The patient is doing outpatient physical therapy, though has now completed and transitioned to home exercise program.    No fevers or drainage from the incision.    There are no concerns.    The patient is mildly limited.     Physical Exam:  The patient is well appearing, alert and oriented to person place and time.    The incision is well healed. There is no sign of wound complication.    Range of Motion: full extension to  125  degrees of flexion.    There is no extensor lag.    There is a small effusion in the knee.    There is no instability. The knee is stable to varus-valgus stress and anterior-posterior stress.     Homans sign is negative.    Neurologic, and vascular examinations are normal.    PROMs  Koos Jr-Knee Injury And Osteoarthritis Outcome Score For Joint Replacement    2024  3:22 PM EST - Filed by Shanita Pope RN   Instructions    The following question concerns the amount of joint stiffness you have experienced during the last week in your knee. Stiffness is a sensation of restriction or slowness in the ease with which you move your knee joint.   How severe is your knee stiffness after first wakening in the morning? Severe   What amount of knee pain have you experienced the last week during the  following activities?   Twisting/pivoting on your knee Severe   Straightening knee fully Moderate   Going up or down stairs Moderate   Standing upright Moderate   The following questions concern your physical function. By this we mean your ability to move around and to look after yourself. For each of the following activities please indicate the degree of difficulty you have experienced in the last week due to your knee.   Rising from sitting Severe   Bending to floor/ an object Severe   Koos Jr Scoring (range: 0 - 100) 42.28     Ort [facility] 12 Item Health Survey (Vr-12)    2/28/2024  9:17 AM EST - Filed by Shanita Pope RN   In general, would you say your health is: Very Good   The following questions are about activities you might do during a typical day. Does your health now limit you in these activities?  If so, how much?   Moderate activities, such as moving a table, pushing a vacuum , bowling, or playing golf? Yes, Limited A Little   Climbing several flights of stairs? Yes, Limited A Little   During the past 4 weeks, have you had any of the following problems with your work or other regular daily activities as a result of your physical health?   Accomplished less than you would like. Yes, Some Of The Time   Were limited in the kind of work or other activities. Yes, Some Of The Time   During the past 4 weeks, have you had any of the following problems with your work or other regular daily activities as a result of any emotional problems (such as feeling depressed or anxious)?   Accomplished less than you would like No, None Of The Time   Didn't do work or other activities as carefully as usual Yes, A Little Of The Time   During the past 4 weeks, how much did pain interfere with your normal work (including both work outside the home and house work)? Moderately   How much of the time during the past 4 weeks:   Have you felt calm and peaceful? All Of The Time   Did you have a lot of energy?  Some Of The Time   Have you felt downhearted and blue? A Little Of The Time   During the past 4 weeks, how much of the time has your physical health or emotional problems interfered with your social activities (like visiting with friends, relatives, etc.)? None Of The Time   Compared to one year ago, how would you rate your physical health in general now? About The Same   Compared to one year ago, how would you rate your emotional problems (such as feeling anxious, depressed or irritable) now? About The Same   Ort Vr-12 Question Pcs (range: 0 - 100) 33.65   Ort Vr-12 Question McS (range: 0 - 100) 60.78          Imaging:    X-rays were personally reviewed today and show implants in good position with no evidence of complication.    Impression and Plan:    78 y.o. male 3 months 5 days from right Total knee replacement.    The patient is doing well following Total knee replacement surgery.  Antibiotic prophylaxis prior to dental procedures were reviewed.  Long term failure mechanisms were reviewed. Discussed importance of long term follow up. A new physical therapy prescription was given to the patient, and we reviewed exercises to be performed at home to work on improving range of motion and strength. The patient was asked to contact the office sooner if there are any concerns. Their questions were answered.     RTC: 1 year     X-rays at next visit: Postop TKA series    _____________________  Katie Ornelas MD   Attending Orthopaedic Surgeon  [facility]    [facility]    This office note was transcribed with dictation software.  Please excuse any typographical errors, program misunderstandings leading to inadvertent insertions or deletions of inappropriate wording, pronoun errors and other unintentional transcription errors not noticed on proof-reading.

## 2024-07-29 ENCOUNTER — HOSPITAL ENCOUNTER (OUTPATIENT)
Dept: RADIOLOGY | Facility: HOSPITAL | Age: 79
Discharge: HOME | End: 2024-07-29
Payer: MEDICARE

## 2024-07-29 ENCOUNTER — OFFICE VISIT (OUTPATIENT)
Dept: ORTHOPEDIC SURGERY | Facility: HOSPITAL | Age: 79
End: 2024-07-29
Payer: MEDICARE

## 2024-07-29 DIAGNOSIS — M17.11 UNILATERAL PRIMARY OSTEOARTHRITIS, RIGHT KNEE: ICD-10-CM

## 2024-07-29 DIAGNOSIS — M17.12 ARTHRITIS OF LEFT KNEE: ICD-10-CM

## 2024-07-29 PROCEDURE — 77073 BONE LENGTH STUDIES: CPT

## 2024-07-29 PROCEDURE — 73562 X-RAY EXAM OF KNEE 3: CPT | Mod: RT

## 2024-07-29 PROCEDURE — 73562 X-RAY EXAM OF KNEE 3: CPT | Mod: RIGHT SIDE | Performed by: RADIOLOGY

## 2024-07-29 PROCEDURE — 73564 X-RAY EXAM KNEE 4 OR MORE: CPT | Mod: LT

## 2024-07-29 PROCEDURE — 99213 OFFICE O/P EST LOW 20 MIN: CPT | Performed by: STUDENT IN AN ORGANIZED HEALTH CARE EDUCATION/TRAINING PROGRAM

## 2024-07-29 PROCEDURE — 1159F MED LIST DOCD IN RCRD: CPT | Performed by: STUDENT IN AN ORGANIZED HEALTH CARE EDUCATION/TRAINING PROGRAM

## 2024-07-29 ASSESSMENT — PAIN - FUNCTIONAL ASSESSMENT: PAIN_FUNCTIONAL_ASSESSMENT: NO/DENIES PAIN

## 2024-07-29 NOTE — PROGRESS NOTES
Katie Ornelas MD   Adult Reconstruction and Joint Replacement Surgery  Phone: [phone]     Fax: [phone]       Name: Kun Castle Jr  : 1945 (Age: 78 y.o.)  Date of Visit: 2024    FOLLOW UP    Procedure: Right Total knee replacement  Date: 3/12/2024    Chief Complaint: Right Total knee replacement surgery follow-up    History of Present Illness:  The patient is now 4 months 17 days out from right Total knee replacement surgery.     The patient has no pain.    Currently taking nothing for pain.     The patient has low stiffness.     Patient is walking with nothing for assistive device.    The patient goes up and down stairs normally - but occasionally with rails due to left knee (arthritic) limitation.    Patient can walk unlimited blocks.    The patient is doing HEP for physical therapy.    No fevers or drainage from the incision.    There are no concerns.    The patient is back to all regular activity - has golfed 3 times without issue. Left knee is now holding him back.      Physical Exam:  The patient is well appearing, alert and oriented to person place and time.    The incision is well healed. There is no sign of wound complication.    Range of Motion: full extension to 120 degrees of flexion.    There is no extensor lag.    There is a small effusion in the knee.    There is no instability. The knee is stable to varus-valgus stress and anterior-posterior stress.     Homans sign is negative.    Neurologic, and vascular examinations are normal.    PROMs  Koos Jr-Knee Injury And Osteoarthritis Outcome Score For Joint Replacement    2024  3:22 PM EST - Filed by Shanita Pope RN   Instructions    The following question concerns the amount of joint stiffness you have experienced during the last week in your knee. Stiffness is a sensation of restriction or slowness in the ease with which you move your knee joint.   How severe is your knee stiffness after first wakening in the  morning? Severe   What amount of knee pain have you experienced the last week during the following activities?   Twisting/pivoting on your knee Severe   Straightening knee fully Moderate   Going up or down stairs Moderate   Standing upright Moderate   The following questions concern your physical function. By this we mean your ability to move around and to look after yourself. For each of the following activities please indicate the degree of difficulty you have experienced in the last week due to your knee.   Rising from sitting Severe   Bending to floor/ an object Severe   Koos Jr Scoring (range: 0 - 100) 42.28     [facility] 12 Item Health Survey (Vr-12)    2/28/2024  9:17 AM EST - Filed by Shanita Pope RN   In general, would you say your health is: Very Good   The following questions are about activities you might do during a typical day. Does your health now limit you in these activities?  If so, how much?   Moderate activities, such as moving a table, pushing a vacuum , bowling, or playing golf? Yes, Limited A Little   Climbing several flights of stairs? Yes, Limited A Little   During the past 4 weeks, have you had any of the following problems with your work or other regular daily activities as a result of your physical health?   Accomplished less than you would like. Yes, Some Of The Time   Were limited in the kind of work or other activities. Yes, Some Of The Time   During the past 4 weeks, have you had any of the following problems with your work or other regular daily activities as a result of any emotional problems (such as feeling depressed or anxious)?   Accomplished less than you would like No, None Of The Time   Didn't do work or other activities as carefully as usual Yes, A Little Of The Time   During the past 4 weeks, how much did pain interfere with your normal work (including both work outside the home and house work)? Moderately   How much of the time during the past 4  weeks:   Have you felt calm and peaceful? All Of The Time   Did you have a lot of energy? Some Of The Time   Have you felt downhearted and blue? A Little Of The Time   During the past 4 weeks, how much of the time has your physical health or emotional problems interfered with your social activities (like visiting with friends, relatives, etc.)? None Of The Time   Compared to one year ago, how would you rate your physical health in general now? About The Same   Compared to one year ago, how would you rate your emotional problems (such as feeling anxious, depressed or irritable) now? About The Same   Ort Vr-12 Question Pcs (range: 0 - 100) 33.65   Ort Vr-12 Question McS (range: 0 - 100) 60.78           Imaging:    X-rays were personally reviewed today and show implants in good position with no evidence of complication.    Impression and Plan:    78 y.o. male  who is 4 months 17 days from right Total knee replacement.    The patient is doing well following Total knee replacement surgery.  Antibiotic prophylaxis prior to dental procedures were reviewed.  Long term failure mechanisms were reviewed. Discussed importance of long term follow up. A new physical therapy prescription was given to the patient, and we reviewed exercises to be performed at home to work on improving range of motion and strength. The patient was asked to contact the office sooner if there are any concerns. Their questions were answered.     The patient anticipates surgery to left knee in the form of total knee replacement in the upcoming months.  He will let me know when he makes a decision on this.    RTC: 1 year     X-rays at next visit: Postop TKA series    _____________________  Katie Ornelas MD   Attending Orthopaedic Surgeon  [facility]    [facility]    This office note was transcribed with dictation software.  Please excuse any typographical errors, program misunderstandings leading to  inadvertent insertions or deletions of inappropriate wording, pronoun errors and other unintentional transcription errors not noticed on proof-reading.

## 2024-09-05 NOTE — PROGRESS NOTES
Subjective   Kun Castle Jr is a 79 y.o. male who presents for patient is here for follow-up.  HPI  Kun is a 77-year-old male with known history of hypertension dyslipidemia vitamin D deficiency rheumatoid arthritis patient is here for follow-up, with major complaint denies chest pain shortness of breath fever chills nausea vomiting constipation diarrhea dysuria urgency frequency.  Patient is fasting for blood work.  Review of Systems  10 system review pertinent as above  Objective     There were no vitals taken for this visit.     Physical Exam  HEENT: Atraumatic normocephalic the pupils are equal and round and reactive to light the sclerae nonicteric extraocular motion are intact.  Neck: Is supple without JVD no carotid bruits the trachea is midline there are no masses pulses are equal and bilateral with normal upstroke.  Skin: Normal.  Skin good texture.  Moist.  Good turgor.  No lesions, no rashes.  Lymph: No lymphadenopathy appreciated, no masses, no lesions  Lungs: Are clear to auscultation and percussion, good breath sounds bilaterally, no rhonchi, no wheezing, good diaphragmatic excursion.  Heart: Normal rate and normal rhythm S1, S2, no S3, no gallop, murmur or rub.  Abdomen: Soft, nontender, no organomegaly, good bowel sounds.    Extremities: Full range of motion, good pulses bilateral.  No cyanosis, no clubbing or edema.  Neuro: Cranial nerves II-XII are grossly intact there is no sensory or motor deficits.  Able to move all extremities.      Assessment/Plan     Influenza HD 09/09/24    Rt Knee pain osteoarthritis  Pain severe at time  Will refer to orthopedic surgery    Left Knee replacement   Planned 10/22/24  Dr Ornelas     Continue with the low-fat, low-cholesterol diet,  I recommended Mediterranean diet, which include fish, chicken, vegetables and olive oil  Exercise daily for 30 minutes at least 3 times a week  Continue home medications    Hypertension  No added salt diet, do not and salt  to your food  Try to exercise every other day for 30 minutes  Continue current medications    Rheumatoid arthritis  With deformed joint integrity  Occasional pain        Problem List Items Addressed This Visit       Rheumatoid arthritis (Multi)    Hypertension    Relevant Medications    losartan (Cozaar) 50 mg tablet         Hayden Bro MD

## 2024-09-09 ENCOUNTER — APPOINTMENT (OUTPATIENT)
Dept: PRIMARY CARE | Facility: CLINIC | Age: 79
End: 2024-09-09
Payer: MEDICARE

## 2024-09-09 VITALS — BODY MASS INDEX: 28.21 KG/M2 | WEIGHT: 191 LBS

## 2024-09-09 DIAGNOSIS — M05.742 RHEUMATOID ARTHRITIS INVOLVING BOTH HANDS WITH POSITIVE RHEUMATOID FACTOR (MULTI): ICD-10-CM

## 2024-09-09 DIAGNOSIS — E78.2 MODERATE MIXED HYPERLIPIDEMIA NOT REQUIRING STATIN THERAPY: Primary | ICD-10-CM

## 2024-09-09 DIAGNOSIS — I10 PRIMARY HYPERTENSION: ICD-10-CM

## 2024-09-09 DIAGNOSIS — M05.741 RHEUMATOID ARTHRITIS INVOLVING BOTH HANDS WITH POSITIVE RHEUMATOID FACTOR (MULTI): ICD-10-CM

## 2024-09-09 PROBLEM — M17.12 UNILATERAL PRIMARY OSTEOARTHRITIS, LEFT KNEE: Status: ACTIVE | Noted: 2024-10-22

## 2024-09-09 PROCEDURE — 90662 IIV NO PRSV INCREASED AG IM: CPT | Performed by: INTERNAL MEDICINE

## 2024-09-09 PROCEDURE — 1036F TOBACCO NON-USER: CPT | Performed by: INTERNAL MEDICINE

## 2024-09-09 PROCEDURE — G0008 ADMIN INFLUENZA VIRUS VAC: HCPCS | Performed by: INTERNAL MEDICINE

## 2024-09-09 PROCEDURE — 1159F MED LIST DOCD IN RCRD: CPT | Performed by: INTERNAL MEDICINE

## 2024-09-09 PROCEDURE — 99214 OFFICE O/P EST MOD 30 MIN: CPT | Performed by: INTERNAL MEDICINE

## 2024-09-09 PROCEDURE — 1126F AMNT PAIN NOTED NONE PRSNT: CPT | Performed by: INTERNAL MEDICINE

## 2024-09-09 RX ORDER — LOSARTAN POTASSIUM 50 MG/1
50 TABLET ORAL DAILY
Qty: 90 TABLET | Refills: 3 | Status: SHIPPED | OUTPATIENT
Start: 2024-09-09 | End: 2025-09-09

## 2024-09-09 ASSESSMENT — ENCOUNTER SYMPTOMS
LOSS OF SENSATION IN FEET: 0
OCCASIONAL FEELINGS OF UNSTEADINESS: 0
DEPRESSION: 0

## 2024-09-09 ASSESSMENT — PAIN SCALES - GENERAL: PAINLEVEL: 0-NO PAIN

## 2024-10-08 ENCOUNTER — APPOINTMENT (OUTPATIENT)
Dept: PREADMISSION TESTING | Facility: HOSPITAL | Age: 79
End: 2024-10-08
Payer: MEDICARE

## 2024-10-09 ENCOUNTER — APPOINTMENT (OUTPATIENT)
Dept: ORTHOPEDIC SURGERY | Facility: HOSPITAL | Age: 79
End: 2024-10-09
Payer: MEDICARE

## 2024-10-10 ENCOUNTER — TELEPHONE (OUTPATIENT)
Dept: ORTHOPEDIC SURGERY | Facility: HOSPITAL | Age: 79
End: 2024-10-10
Payer: MEDICARE

## 2024-10-10 NOTE — TELEPHONE ENCOUNTER
Thank you for taking my call today.  All questions were answered at the time of the call, but please feel free to reach out to me via [employer] or phone, [phone], with any new questions or concerns.       We confirmed that you opted to enroll in our [id number] program so your discharge prescriptions will be available to take home at the time of discharge.  Please bring any prescription insurance coverage with you on the morning of surgery so that we can enter the information into our system.     We confirmed that your plan would be to Discharge Home same day (Rapid Recovery).    We confirmed that you have DME needed for recovery.     Use the provided body wash for 4 days before surgery and complete a 5th shower on the morning of surgery, this includes your body and hair.  Follow the directions as provided during preadmission testing.  The mouth wash will be used the night before and the morning of surgery.       As a reminder, if you do not hear from our team, please call [phone] between 2pm and 3pm the business day before your surgery to confirm your arrival time and details.        Please don't hesitate to reach out with additional questions or concerns.    Violette Sosa MBA, BSN, RN-BC  BEENA RamirezN, RN  [facility] Program Navigators  [facility]  [phone]

## 2024-10-25 ENCOUNTER — APPOINTMENT (OUTPATIENT)
Dept: PHYSICAL THERAPY | Facility: CLINIC | Age: 79
End: 2024-10-25
Payer: MEDICARE

## 2024-11-06 ENCOUNTER — APPOINTMENT (OUTPATIENT)
Dept: ORTHOPEDIC SURGERY | Facility: HOSPITAL | Age: 79
End: 2024-11-06
Payer: MEDICARE

## 2024-11-18 ENCOUNTER — PRE-ADMISSION TESTING (OUTPATIENT)
Dept: PREADMISSION TESTING | Facility: HOSPITAL | Age: 79
End: 2024-11-18
Payer: MEDICARE

## 2024-11-18 VITALS
OXYGEN SATURATION: 96 % | DIASTOLIC BLOOD PRESSURE: 92 MMHG | BODY MASS INDEX: 28.22 KG/M2 | WEIGHT: 197.1 LBS | SYSTOLIC BLOOD PRESSURE: 177 MMHG | RESPIRATION RATE: 16 BRPM | TEMPERATURE: 97.7 F | HEART RATE: 89 BPM | HEIGHT: 70 IN

## 2024-11-18 DIAGNOSIS — R79.9 ABNORMAL FINDING OF BLOOD CHEMISTRY, UNSPECIFIED: ICD-10-CM

## 2024-11-18 DIAGNOSIS — M17.12 UNILATERAL PRIMARY OSTEOARTHRITIS, LEFT KNEE: ICD-10-CM

## 2024-11-18 LAB
ALBUMIN SERPL BCP-MCNC: 4 G/DL (ref 3.4–5)
ALP SERPL-CCNC: 57 U/L (ref 33–136)
ALT SERPL W P-5'-P-CCNC: 12 U/L (ref 10–52)
ANION GAP SERPL CALC-SCNC: 13 MMOL/L (ref 10–20)
AST SERPL W P-5'-P-CCNC: 16 U/L (ref 9–39)
ATRIAL RATE: 81 BPM
BASOPHILS # BLD AUTO: 0.03 X10*3/UL (ref 0–0.1)
BASOPHILS NFR BLD AUTO: 0.5 %
BILIRUB SERPL-MCNC: 0.6 MG/DL (ref 0–1.2)
BUN SERPL-MCNC: 28 MG/DL (ref 6–23)
CALCIUM SERPL-MCNC: 8.6 MG/DL (ref 8.6–10.3)
CHLORIDE SERPL-SCNC: 104 MMOL/L (ref 98–107)
CO2 SERPL-SCNC: 23 MMOL/L (ref 21–32)
CREAT SERPL-MCNC: 1.14 MG/DL (ref 0.5–1.3)
EGFRCR SERPLBLD CKD-EPI 2021: 65 ML/MIN/1.73M*2
EOSINOPHIL # BLD AUTO: 0.16 X10*3/UL (ref 0–0.4)
EOSINOPHIL NFR BLD AUTO: 2.6 %
ERYTHROCYTE [DISTWIDTH] IN BLOOD BY AUTOMATED COUNT: 13.1 % (ref 11.5–14.5)
GLUCOSE SERPL-MCNC: 90 MG/DL (ref 74–99)
HCT VFR BLD AUTO: 38.7 % (ref 41–52)
HGB BLD-MCNC: 12.6 G/DL (ref 13.5–17.5)
IMM GRANULOCYTES # BLD AUTO: 0.01 X10*3/UL (ref 0–0.5)
IMM GRANULOCYTES NFR BLD AUTO: 0.2 % (ref 0–0.9)
LYMPHOCYTES # BLD AUTO: 1.38 X10*3/UL (ref 0.8–3)
LYMPHOCYTES NFR BLD AUTO: 22.4 %
MCH RBC QN AUTO: 31.9 PG (ref 26–34)
MCHC RBC AUTO-ENTMCNC: 32.6 G/DL (ref 32–36)
MCV RBC AUTO: 98 FL (ref 80–100)
MONOCYTES # BLD AUTO: 0.5 X10*3/UL (ref 0.05–0.8)
MONOCYTES NFR BLD AUTO: 8.1 %
NEUTROPHILS # BLD AUTO: 4.09 X10*3/UL (ref 1.6–5.5)
NEUTROPHILS NFR BLD AUTO: 66.2 %
NRBC BLD-RTO: ABNORMAL /100{WBCS}
P AXIS: 73 DEGREES
P OFFSET: 146 MS
P ONSET: 91 MS
PLATELET # BLD AUTO: 176 X10*3/UL (ref 150–450)
POTASSIUM SERPL-SCNC: 4.2 MMOL/L (ref 3.5–5.3)
PR INTERVAL: 238 MS
PROT SERPL-MCNC: 6.3 G/DL (ref 6.4–8.2)
Q ONSET: 210 MS
QRS COUNT: 14 BEATS
QRS DURATION: 100 MS
QT INTERVAL: 392 MS
QTC CALCULATION(BAZETT): 455 MS
QTC FREDERICIA: 433 MS
R AXIS: -50 DEGREES
RBC # BLD AUTO: 3.95 X10*6/UL (ref 4.5–5.9)
SODIUM SERPL-SCNC: 136 MMOL/L (ref 136–145)
T AXIS: 39 DEGREES
T OFFSET: 406 MS
VENTRICULAR RATE: 81 BPM
WBC # BLD AUTO: 6.2 X10*3/UL (ref 4.4–11.3)

## 2024-11-18 PROCEDURE — 87081 CULTURE SCREEN ONLY: CPT | Mod: BEALAB

## 2024-11-18 PROCEDURE — 93005 ELECTROCARDIOGRAM TRACING: CPT

## 2024-11-18 PROCEDURE — 93010 ELECTROCARDIOGRAM REPORT: CPT | Performed by: INTERNAL MEDICINE

## 2024-11-18 PROCEDURE — 99203 OFFICE O/P NEW LOW 30 MIN: CPT | Performed by: NURSE PRACTITIONER

## 2024-11-18 PROCEDURE — 36415 COLL VENOUS BLD VENIPUNCTURE: CPT

## 2024-11-18 PROCEDURE — 83036 HEMOGLOBIN GLYCOSYLATED A1C: CPT | Mod: BEALAB

## 2024-11-18 PROCEDURE — 84075 ASSAY ALKALINE PHOSPHATASE: CPT

## 2024-11-18 PROCEDURE — 85025 COMPLETE CBC W/AUTO DIFF WBC: CPT

## 2024-11-18 RX ORDER — CHLORHEXIDINE GLUCONATE ORAL RINSE 1.2 MG/ML
15 SOLUTION DENTAL DAILY
Qty: 30 ML | Refills: 0 | Status: SHIPPED | OUTPATIENT
Start: 2024-11-18 | End: 2024-11-20

## 2024-11-18 RX ORDER — CHLORHEXIDINE GLUCONATE 40 MG/ML
SOLUTION TOPICAL DAILY
Qty: 470 ML | Refills: 0 | Status: SHIPPED | OUTPATIENT
Start: 2024-11-18 | End: 2024-11-23

## 2024-11-18 ASSESSMENT — DUKE ACTIVITY SCORE INDEX (DASI)
CAN YOU PARTICIPATE IN STRENOUS SPORTS LIKE SWIMMING, SINGLES TENNIS, FOOTBALL, BASKETBALL, OR SKIING: NO
CAN YOU PARTICIPATE IN MODERATE RECREATIONAL ACTIVITIES LIKE GOLF, BOWLING, DANCING, DOUBLES TENNIS OR THROWING A BASEBALL OR FOOTBALL: NO
TOTAL_SCORE: 28.7
CAN YOU DO YARD WORK LIKE RAKING LEAVES, WEEDING OR PUSHING A MOWER: YES
CAN YOU CLIMB A FLIGHT OF STAIRS OR WALK UP A HILL: YES
DASI METS SCORE: 6.3
CAN YOU DO LIGHT WORK AROUND THE HOUSE LIKE DUSTING OR WASHING DISHES: YES
CAN YOU RUN A SHORT DISTANCE: NO
CAN YOU DO HEAVY WORK AROUND THE HOUSE LIKE SCRUBBING FLOORS OR LIFTING AND MOVING HEAVY FURNITURE: NO
CAN YOU DO MODERATE WORK AROUND THE HOUSE LIKE VACUUMING, SWEEPING FLOORS OR CARRYING GROCERIES: YES
CAN YOU WALK A BLOCK OR TWO ON LEVEL GROUND: YES
CAN YOU HAVE SEXUAL RELATIONS: YES
CAN YOU TAKE CARE OF YOURSELF (EAT, DRESS, BATHE, OR USE TOILET): YES
CAN YOU WALK INDOORS, SUCH AS AROUND YOUR HOUSE: YES

## 2024-11-18 ASSESSMENT — PAIN SCALES - GENERAL: PAINLEVEL_OUTOF10: 0 - NO PAIN

## 2024-11-18 ASSESSMENT — PAIN - FUNCTIONAL ASSESSMENT: PAIN_FUNCTIONAL_ASSESSMENT: 0-10

## 2024-11-18 NOTE — CPM/PAT H&P
CPM/PAT Evaluation       Name: Kun Castle Jr (Kun Crawfordagustin Yu)  /Age: 1945/79 y.o.     In-Person       Chief Complaint: Unilateral primary osteoarthritis, left knee     HPI  Patient is an alert and oriented 79 year old male scheduled for a  Arthroplasty Resurfacing Total Knee on 12/3/24 with Dr. Ornelas for Unilateral primary osteoarthritis, left knee. PMHX includes HTN, RA. Presents to [facility] PAT today for perioperative risk stratification and optimization.     Past Medical History:   Diagnosis Date    Asymptomatic varicose veins of bilateral lower extremities 2015    Varicose veins of legs    Diverticulosis     Hemorrhage of anus and rectum 10/06/2016    Bright red rectal bleeding    Hypertension     Nocturia 2016    Nocturia    Orthostatic hypotension 2015    Sympathotonic orthostatic hypotension    Pain in right knee 2015    Recurrent knee pain, right    Personal history of diseases of the skin and subcutaneous tissue 2015    History of rhinophyma    Personal history of other diseases of the digestive system     History of gastroesophageal reflux (GERD)    Personal history of other diseases of the nervous system and sense organs 10/29/2015    History of sciatica    Rheumatoid arthritis (Multi)     Sciatica, unspecified side 2015    Acute sciatica    Vitamin D deficiency, unspecified 2016    Vitamin D deficiency       Past Surgical History:   Procedure Laterality Date    CATARACT EXTRACTION, BILATERAL      CHOLECYSTECTOMY      COLONOSCOPY  2014    Colonoscopy (Fiberoptic)    HERNIA REPAIR      TENDON REPAIR Left     left hand    TONSILLECTOMY         Patient  has no history on file for sexual activity.    No family history on file.    No Known Allergies    Prior to Admission medications    Medication Sig Start Date End Date Taking? Authorizing Provider   b complex (B Complex 1, with folic acid,) 0.4 mg tablet Take 1 tablet by mouth once daily.   Yes  "Historical Provider, MD   cholecalciferol (Vitamin D-3) 50 MCG (2000 UT) tablet Take 1 tablet (2,000 Units) by mouth once daily.   Yes Historical Provider, MD   losartan (Cozaar) 50 mg tablet Take 1 tablet (50 mg) by mouth once daily. EVENING 9/9/24 9/9/25 Yes Hayden Bro MD   multivitamin tablet Take 1 tablet by mouth once daily. SENIOR MULTIVITAMIN   Yes Historical Provider, MD   chlorhexidine (Hibiclens) 4 % external liquid Apply topically once daily for 5 days. 11/18/24 11/23/24  MAHI Bucio   chlorhexidine (Peridex) 0.12 % solution Use 15 mL in the mouth or throat once daily for 2 doses. Mouthwash, use 15 ml the night before surgery and the morning of surgery. Swish and spit, do not swallow 11/18/24 11/20/24  MAHI Bucio   ondansetron (Zofran) 4 mg tablet Take 1 tablet (4 mg) by mouth every 8 hours if needed for nausea or vomiting.  Patient not taking: Reported on 11/18/2024 3/7/24   Katie Ornelas MD   pantoprazole (ProtoNix) 40 mg EC tablet Take 1 tablet (40 mg) by mouth once daily in the morning. Take before meals. Do not crush, chew, or split. 3/7/24 9/9/24  Katie Ornelas MD      Visit Vitals  BP (!) 177/92   Pulse 89   Temp 36.5 °C (97.7 °F) (Temporal)   Resp 16   Ht 1.778 m (5' 10\")   Wt 89.4 kg (197 lb 1.6 oz)   SpO2 96%   BMI 28.28 kg/m²   Smoking Status Never   BSA 2.1 m²         Review of Systems   Constitutional: Negative for chills, decreased appetite, diaphoresis, fever and malaise/fatigue.   Eyes:  Negative for blurred vision and double vision.   Cardiovascular:  Negative for chest pain, claudication, cyanosis, dyspnea on exertion, irregular heartbeat, leg swelling, near-syncope and palpitations.   Respiratory:  Negative for cough, hemoptysis, shortness of breath and wheezing.    Endocrine: Negative for cold intolerance, heat intolerance, polydipsia, polyphagia and polyuria.   Gastrointestinal:  Negative for abdominal pain, constipation, diarrhea, dysphagia, nausea and " vomiting.   Genitourinary:  Negative for bladder incontinence, dysuria, hematuria, incomplete emptying, nocturia, frequency, pelvic pain and urgency.   Neurological:  Negative for headaches, light-headedness, paresthesias, sensory change and weakness.   Psychiatric/Behavioral:  Negative for altered mental status.    Musculoskeletal: Positive for myalgias, arthralgias     Vitals and nursing note reviewed.     Physical exam  Constitutional:       Appearance: Normal appearance. He is Overweight.   HENT:      Head: Normocephalic and atraumatic.      Mouth/Throat:      Mouth: Mucous membranes are moist.      Pharynx: Oropharynx is clear.   Eyes:      Extraocular Movements: Extraocular movements intact.      Conjunctiva/sclera: Conjunctivae normal.      Pupils: Pupils are equal, round, and reactive to light.   Cardiovascular:      PMI at left midclavicular line. Normal rate. Regular rhythm. Normal S1. Normal S2.       Murmurs: There is no murmur.      No gallop.  No click. No rub.       No audible carotid bruit     No lower extremity edema on exam  Pulmonary:      Effort: Pulmonary effort is normal.      Breath sounds: Normal breath sounds.   Abdominal:      General: Abdomen is flat. Bowel sounds are normal.      Palpations: Abdomen is soft and non-tender  Musculoskeletal:      Cervical back: Normal range of motion and neck supple.   He endorses pain to the left knee with limited ROM  Skin:     General: Skin is warm and dry.      Capillary Refill: Capillary refill takes less than 2 seconds.   Neurological:      General: No focal deficit present.      Mental Status: He is alert and oriented to person, place, and time. Mental status is at baseline.   Psychiatric:         Mood and Affect: Mood normal.         Behavior: Behavior normal.         Thought Content: Thought content normal.         Judgment: Judgment normal.     Vitals and nursing note reviewed. Physical exam within normal limits.     DASI Risk Score      Flowsheet  Row Pre-Admission Testing from 3/5/2024 in [facility]   Can you take care of yourself (eat, dress, bathe, or use toilet)?  2.75 filed at 03/05/2024 1447   Can you walk indoors, such as around your house? 1.75 filed at 03/05/2024 1447   Can you walk a block or two on level ground?  2.75 filed at 03/05/2024 1447   Can you climb a flight of stairs or walk up a hill? 5.5 filed at 03/05/2024 1447   Can you run a short distance? 8 filed at 03/05/2024 1447   Can you do light work around the house like dusting or washing dishes? 2.7 filed at 03/05/2024 1447   Can you do moderate work around the house like vacuuming, sweeping floors or carrying groceries? 3.5 filed at 03/05/2024 1447   Can you do heavy work around the house like scrubbing floors or lifting and moving heavy furniture?  8 filed at 03/05/2024 1447   Can you do yard work like raking leaves, weeding or pushing a mower? 4.5 filed at 03/05/2024 1447   Can you have sexual relations? 5.25 filed at 03/05/2024 1447   Can you participate in moderate recreational activities like golf, bowling, dancing, doubles tennis or throwing a baseball or football? 6 filed at 03/05/2024 1447   Can you participate in strenous sports like swimming, singles tennis, football, basketball, or skiing? 0 filed at 03/05/2024 1447   DASI SCORE 50.7 filed at 03/05/2024 1447   METS Score (Will be calculated only when all the questions are answered) 9 filed at 03/05/2024 1447          Caprini DVT Assessment      Flowsheet Row Admission (Discharged) from 3/12/2024 in [facility] OR with Katie Ornelas MD   DVT Score 6 filed at 03/12/2024 0947   BMI 30 or less filed at 03/12/2024 0947   RETIRED: Current Status Major surgery planned, including arthroscopic and laproscopic (1-2 hours) filed at 03/12/2024 0947   RETIRED: Age Over 75 years filed at 03/12/2024 0947          Modified Frailty Index    No data to display       CHADS2 Stroke Risk  Current as of 6 hours  ago        N/A 3 to 100%: High Risk   2 to < 3%: Medium Risk   0 to < 2%: Low Risk     Last Change: N/A          This score determines the patient's risk of having a stroke if the patient has atrial fibrillation.        This score is not applicable to this patient. Components are not calculated.          Revised Cardiac Risk Index      Flowsheet Row Pre-Admission Testing from 3/5/2024 in [facility]   High-Risk Surgery (Intraperitoneal, Intrathoracic,Suprainguinal vascular) 0 filed at 03/05/2024 1524   History of ischemic heart disease (History of MI, History of positive exercuse test, Current chest paint considered due to myocardial ischemia, Use of nitrate therapy, ECG with pathological Q Waves) 0 filed at 03/05/2024 1524   History of congestive heart failure (pulmonary edemia, bilateral rales or S3 gallop, Paroxysmal nocturnal dyspnea, CXR showing pulmonary vascular redistribution) 0 filed at 03/05/2024 1524   History of cerebrovascular disease (Prior TIA or stroke) 0 filed at 03/05/2024 1524   Pre-operative insulin treatment 0 filed at 03/05/2024 1524   Pre-operative creatinine>2 mg/dl 0 filed at 03/05/2024 1524   Revised Cardiac Risk Calculator 0 filed at 03/05/2024 1524          Apfel Simplified Score    No data to display       Risk Analysis Index Results This Encounter    No data found in the last 10 encounters.       Stop Bang Score      Flowsheet Row Pre-Admission Testing from 11/18/2024 in [facility] Pre-Admission Testing from 3/5/2024 in [facility]   Do you snore loudly? 0 filed at 11/18/2024 1102 0 filed at 03/05/2024 1447   Do you often feel tired or fatigued after your sleep? 0 filed at 11/18/2024 1102 0 filed at 03/05/2024 1447   Has anyone ever observed you stop breathing in your sleep? 0 filed at 11/18/2024 1102 0 filed at 03/05/2024 1447   Do you have or are you being treated for high blood pressure? 1 filed at 11/18/2024 1102 1 filed at 03/05/2024  1447   Recent BMI (Calculated) 28.3 filed at 11/18/2024 1102 28.6 filed at 03/05/2024 1447   Is BMI greater than 35 kg/m2? 0=No filed at 11/18/2024 1102 0=No filed at 03/05/2024 1447   Age older than 50 years old? 1=Yes filed at 11/18/2024 1102 1=Yes filed at 03/05/2024 1447   Is your neck circumference greater than 17 inches (Male) or 16 inches (Female)? 0 filed at 11/18/2024 1102 0 filed at 03/05/2024 1447   Gender - Male 1=Yes filed at 11/18/2024 1102 1=Yes filed at 03/05/2024 1447   STOP-BANG Total Score 3 filed at 11/18/2024 1102 3 filed at 03/05/2024 1447          Prodigy: High Risk  Total Score: 24              Prodigy Age Score      Prodigy Gender Score          ARISCAT Score for Postoperative Pulmonary Complications    No data to display       Addison Perioperative Risk for Myocardial Infarction or Cardiac Arrest (TOYIN)    No data to display       Assessment & Plan:    Neuro:  No diagnosis or significant findings on chart review or clinical presentation and evaluation.     HEENT/Airway:  No diagnosis or significant findings on chart review or clinical presentation and evaluation.   STOP-BANG Score- 3 points low  risk for LINUS    Mallampati::  II    TM distance::  >3 FB    Neck ROM::  Full  Dentures-denies  Crowns-denies  Implants-denies    Cardiovascular:  HTN diagnosis or significant findings on chart review or clinical presentation and evaluation.   METS: 6.3  RCRI: 0 points, 3.9%  risk for postoperative MACE   TOYIN: 0.22% risk for postoperative MACE  EKG -RBBB Rate- No acute changes    Pulmonary:  No diagnosis or significant findings on chart review or clinical presentation and evaluation.   ARISCAT: 26-44 points, 13.3% risk of in-hospital postoperative pulmonary complication  PRODIGY: Moderate risk for opioid induced respiratory depression  Smoking History-He quit smoking approximately  40  years ago.  Discussed smoking cessation and deep breathing handout given    Renal/Urinary:    No diagnosis or  significant findings on chart review or clinical presentation and evaluation, however, the patient is at increased risk of perioperative renal complications secondary to HTN. Preventative measures include BP monitoring, medication compliance, and hydration management.   CMP-reviewed   Creatinine- 1.14  GFR-65    Endocrine:  No diagnosis or significant findings on chart review or clinical presentation and evaluation.   QQT4D-0.3    Hematologic/Immunology:  No diagnosis or significant findings on chart review or clinical presentation and evaluation.  The patient is not a Jehovah’s witness and will accept blood and blood products if medically indicated.   History of previous blood transfusions No  CBC-Reviewed  H/H 12.6/38.7  Caprini Score 7, patient at Moderate for postoperative DVT. Pt supplied education/VTE handout  Anticoagulation use: No     Gastrointestinal:   No diagnosis or significant findings on chart review or clinical presentation and evaluation.   Recreational drug use: Drug use No  Alcohol use 2 liquor drinks per week    Infectious disease:   No diagnosis or significant findings on chart review or clinical presentation and evaluation.   Prescription provided for CHG body wash and dental rinse. CHG use instructions reviewed and provided to patient.  Staph screen collected-negative    Musculoskeletal:   RA with previous right knee replacement   JHFRAT score-6  points. moderate risk for falls    Anesthesia:  ASA 2 - Patient with mild systemic disease with no functional limitations  Anticipated anesthesia-spinal   History of General anesthesia- yes  Complications- No anesthesia complications  No family history of anesthesia complications    Abnormalities noted on PAT evaluation: No    Labs & Imaging ordered:  CBC, CMP, HBA1C, MRSA, EKG  Nickel/metal allergy-negative  Shellfish allergy-negative     Discussed with patient medication instructions, NPO guidelines, and any questions or concerns.      time- 35  minutes

## 2024-11-18 NOTE — PREPROCEDURE INSTRUCTIONS
Medication List            Accurate as of November 18, 2024 11:21 AM. Always use your most recent med list.                B Complex 1 (with folic acid) 0.4 mg tablet  Generic drug: b complex  Additional Medication Adjustments for Surgery: Take last dose 7 days before surgery  Notes to patient: Last dose preoperatively on 11/25/24     * chlorhexidine 4 % external liquid  Commonly known as: Hibiclens  Apply topically once daily for 5 days.     * chlorhexidine 0.12 % solution  Commonly known as: Peridex  Use 15 mL in the mouth or throat once daily for 2 doses. Mouthwash, use 15 ml the night before surgery and the morning of surgery. Swish and spit, do not swallow     cholecalciferol 50 MCG (2000 UT) tablet  Commonly known as: Vitamin D-3  Additional Medication Adjustments for Surgery: Take last dose 7 days before surgery  Notes to patient: Last dose preoperatively on 11/25/24     losartan 50 mg tablet  Commonly known as: Cozaar  Take 1 tablet (50 mg) by mouth once daily. EVENING  Medication Adjustments for Surgery: Take/Use as prescribed     multivitamin tablet  Additional Medication Adjustments for Surgery: Take last dose 7 days before surgery  Notes to patient: Last dose preoperatively on 11/25/24     ondansetron 4 mg tablet  Commonly known as: Zofran  Take 1 tablet (4 mg) by mouth every 8 hours if needed for nausea or vomiting.  Medication Adjustments for Surgery: Take/Use as prescribed     pantoprazole 40 mg EC tablet  Commonly known as: ProtoNix  Take 1 tablet (40 mg) by mouth once daily in the morning. Take before meals. Do not crush, chew, or split.  Medication Adjustments for Surgery: Take/Use as prescribed           * This list has 2 medication(s) that are the same as other medications prescribed for you. Read the directions carefully, and ask your doctor or other care provider to review them with you.                NPO Instructions:     Do not eat any food after midnight the night before your  surgery/procedure.  You may have clear liquids until TWO hours before surgery/procedure. This includes water, black tea/coffee, (no milk or cream) apple juice and electrolyte drinks (Gatorade).  You may chew gum up to TWO hours before your surgery/procedure.     Additional Instructions:      Seven/Six Days before Surgery:  Review your medication instructions, stop indicated medications  Five Days before Surgery:  Review your medication instructions, stop indicated medications  Begin using your Hibiclens  Three Days before Surgery:  Review your medication instructions, stop indicated medications  The Day before Surgery:  Start using 0.12% CHG mouthwash  No smoking or alcohol use 24 hours before surgery  Review your medication instructions, stop indicated medications  You will be contacted regarding the time of your arrival to facility and surgery time  Do not eat any food after Midnight  Day of Surgery:  Review your medication instructions, take indicated medications  If you have diabetes, please check your fasting blood sugar upon awakening.  If fasting blood sugar is <80 mg/dl, drink 100 ml of apple juice, time limit of 2 hours before  You may have clear liquids until TWO hours before surgery/procedure.  This includes water, black tea/coffee, (no milk or cream) apple juice and electrolyte drinks (Gatorade)  You may chew gum up to TWO hours before your surgery/procedure  Wear  comfortable loose fitting clothing  Do not use moisturizers, creams, lotions or perfume  All jewelry and valuables should be left at home     CONTACT SURGEON'S OFFICE IF YOU DEVELOP:  * Fever = 100.4 F   * New respiratory symptoms (e.g. cough, shortness of breath, respiratory distress, sore throat)  * Recent loss of taste or smell  *Flu like symptoms such as headache, fatigue or gastrointestinal symptoms  * You develop any open sores, shingles, burning or painful urination   AND/OR:  * You no longer wish to have the surgery.  * Any other  personal circumstances change that may lead to the need to cancel or defer this surgery.  *You were admitted to any hospital within one week of your planned procedure.     SMOKING:  *Quitting smoking can make a huge difference to your health and recovery from surgery.    *If you need help with quitting, call 9-397-QUIT-NOW.     THE DAY BEFORE SURGERY:  *Do not eat any food after midnight the night before your surgery.   *You may have up to TEN OUNCES of clear liquids until TWO hours before your instructed ARRIVAL TIME to hospital. This includes water, black tea/coffee, (no milk or cream) apple juice, clear broth and electrolyte drinks (Gatorade). Please avoid clear liquids that are red in color.   *You may chew gum/mints up to TWO hours before your surgery/procedure.     SURGICAL TIME:  *You will be contacted between 2 p.m. and 3 p.m. the business day before your surgery with your arrival time.  *If you haven't received a call by 3pm, call [phone]  *Scheduled surgery times may change and you will be notified if this occurs-check your personal voicemail for any updates.     ON THE MORNING OF SURGERY:  *Wear comfortable, loose fitting clothing.   *Do not use moisturizers, creams, lotions or perfume.  *All jewelry and valuables should be left at home.  *Prosthetic devices such as contact lenses, hearing aids, dentures, eyelash extensions, hairpins and body piercing must be removed before surgery.     BRING WITH YOU:  *Photo ID and insurance card  *Current list of medications and allergies  *Pacemaker/Defibrillator/Heart stent cards  *CPAP machine and mask  *Slings/splints/crutches  *Copy of your complete Advanced Directive/DHPOA-if applicable  *Neurostimulator implant remote     PARKING AND ARRIVAL:  *Check in at the Main Entrance desk and let them know you are here for surgery.     IF YOU ARE HAVING OUTPATIENT/SAME DAY SURGERY:  *A responsible adult MUST accompany you at the time of discharge and stay with you  for 24 hours after your surgery.  *You may NOT drive yourself home after surgery.  *You may use a taxi or ride sharing service ([employer], Uber) to return home ONLY if you are accompanied by a friend or family member.  *Instructions for resuming your medications will be provided by your surgeon.     Thank you for coming to Pre Admission testing.      If I have prescribe medication please don't forget to  at your pharmacy.      Any questions about today's visit call [phone] and leave a message in the general mailbox.     Patient instructed to ambulate as soon as possible postoperatively to decrease thromboembolic risk.     Jessica Odom, APRN-CNP     Thank you for visiting the [facility].  If you have any changes to your health condition, please call the surgeons office to alert them and give them details of your symptoms.        Preoperative Fasting Guidelines     Why must I stop eating and drinking near surgery time?  With sedation, food or liquid in your stomach can enter your lungs causing serious complications  Increases nausea and vomiting     When do I need to stop eating and drinking before my surgery?  Do not eat any food after midnight the night before your surgery/procedure.  You may have up to TEN ounces of clear liquid until TWO hours before your instructed arrival time to the hospital.  This includes water, black tea/coffee, (no milk or cream) apple juice, and electrolyte drinks (Gatorade)  You may chew gum until TWO hours before your surgery/procedure        Additional Instructions:      The Day before Surgery:  -Review your medication instructions, stop indicated medications  -You will be contacted in the evening regarding the time of your arrival to facility and surgery time     Day of Surgery:  -Review your medication instructions, take indicated medications  -Wear comfortable loose fitting clothing  -Do not use moisturizers, creams, lotions or perfume  -All jewelry and  valuables should be left at home                   Preoperative Brain Exercises     What are brain exercises?  A brain exercise is any activity that engages your thinking (cognitive) skills.     What types of activities are considered brain exercises?  Jigsaw puzzles, crossword puzzles, word jumble, memory games, word search, and many more.  Many can be found free online or on your phone via a mobile ford.     Why should I do brain exercises before my surgery?  More recent research has shown brain exercise before surgery can lower the risk of postoperative delirium (confusion) which can be especially important for older adults.  Patients who did brain exercises for 5 to 10 hours the days before surgery, cut their risk of postoperative delirium in half up to 1 week after surgery.                         [facility]     Preoperative Deep Breathing Exercises     Why it is important to do deep breathing exercises before my surgery?  Deep breathing exercises strengthen your breathing muscles.  This helps you to recover after your surgery and decreases the chance of breathing complications.        How are the deep breathing exercises done?  Sit straight with your back supported.  Breathe in deeply and slowly through your nose. Your lower rib cage should expand and your abdomen may move forward.  Hold that breath for 3 to 5 seconds.  Breathe out through pursed lips, slowly and completely.  Rest and repeat 10 times every hour while awake.  Rest longer if you become dizzy or lightheaded.                      [facility]     Preoperative Deep Breathing Exercises     Why it is important to do deep breathing exercises before my surgery?  Deep breathing exercises strengthen your breathing muscles.  This helps you to recover after your surgery and decreases the chance of breathing complications.        How are the deep breathing exercises done?  Sit straight with your back  supported.  Breathe in deeply and slowly through your nose. Your lower rib cage should expand and your abdomen may move forward.  Hold that breath for 3 to 5 seconds.  Breathe out through pursed lips, slowly and completely.  Rest and repeat 10 times every hour while awake.  Rest longer if you become dizzy or lightheaded.        Patient Information: Incentive Spirometer  What is an incentive spirometer?  An incentive spirometer is a device used before and after surgery to “exercise” your lungs.  It helps you to take deeper breaths to expand your lungs.  Below is an example of a basic incentive spirometer.  The device you receive may differ slightly but they all function the same.    Why do I need to use an incentive spirometer?  Using your incentive spirometer prepares your lungs for surgery and helps prevent lung problems after surgery.  How do I use my incentive spirometer?  When you're using your incentive spirometer, make sure to breathe through your mouth. If you breathe through your nose, the incentive spirometer won't work properly. You can hold your nose if you have trouble.  If you feel dizzy at any time, stop and rest. Try again at a later time.  Follow the steps below:  Set up your incentive spirometer, expand the flexible tubing and connect to the outlet.  Sit upright in a chair or bed. Hold the incentive spirometer at eye level.   Put the mouthpiece in your mouth and close your lips tightly around it. Slowly breathe out (exhale) completely.  Breathe in (inhale) slowly through your mouth as deeply as you can. As you take a breath, you will see the piston rise inside the large column. While the piston rises, the indicator should move upwards. It should stay in between the 2 arrows (see Figure).  Try to get the piston as high as you can, while keeping the indicator between the arrows.   If the indicator doesn't stay between the arrows, you're breathing either too fast or too slow.  When you get it as high  as you can, hold your breath for 10 seconds, or as long as possible. While you're holding your breath, the piston will slowly fall to the base of the spirometer.  Once the piston reaches the bottom of the spirometer, breathe out slowly through your mouth. Rest for a few seconds.  Repeat 10 times. Try to get the piston to the same level with each breath.  Repeat every hour while awake  You can carefully clean the outside of the mouthpiece with an alcohol wipe or soap and water.       Patient and Family Education             Ways You Can Help Prevent Blood Clots                    This handout explains some simple things you can do to help prevent blood clots.      Blood clots are blockages that can form in the body's veins. When a blood clot forms in your deep veins, it may be called a deep vein thrombosis, or DVT for short. Blood clots can happen in any part of the body where blood flows, but they are most common in the arms and legs. If a piece of a blood clot breaks free and travels to the lungs, it is called a pulmonary embolus (PE). A PE can be a very serious problem.         Being in the hospital or having surgery can raise your chances of getting a blood clot because you may not be well enough to move around as much as you normally do.         Ways you can help prevent blood clots in the hospital           Wearing SCDs. SCDs stands for Sequential Compression Devices.   SCDs are special sleeves that wrap around your legs  They attach to a pump that fills them with air to gently squeeze your legs every few minutes.   This helps return the blood in your legs to your heart.   SCDs should only be taken off when walking or bathing.   SCDs may not be comfortable, but they can help save your life.                                            Wearing compression stockings - if your doctor orders them. These special snug fitting stockings gently squeeze your legs to help blood flow.       Walking. Walking helps move the  blood in your legs.   If your doctor says it is ok, try walking the halls at least   5 times a day. Ask us to help you get up, so you don't fall.      Taking any blood thinning medicines your doctor orders.        Page 1 of 2            [facility]; 3/23   Ways you can help prevent blood clots at home         Wearing compression stockings - if your doctor orders them. ? Walking - to help move the blood in your legs.       Taking any blood thinning medicines your doctor orders.      Signs of a blood clot or PE        Tell your doctor or nurse know right away if you have of the problems listed below.    If you are at home, seek medical care right away. Call 911 for chest pain or problems breathing.                Signs of a blood clot (DVT) - such as pain,  swelling, redness or warmth in your arm or leg      Signs of a pulmonary embolism (PE) - such as chest pain or feeling short of breath    Patient Information: Pre-Operative Infection Prevention Measures     Why did I have my nose, under my arms, and groin swabbed?  The purpose of the swab is to identify Staphylococcus aureus inside your nose or on your skin.  The swab was sent to the laboratory for culture.  A positive swab/culture for Staphylococcus aureus is called colonization or carriage.      What is Staphylococcus aureus?  Staphylococcus aureus, also known as “staph”, is a germ found on the skin or in the nose of healthy people.  Sometimes Staphylococcus aureus can get into the body and cause an infection.  This can be minor (such as pimples, boils, or other skin problems).  It might also be serious (such as a blood infection, pneumonia, or a surgical site infection).    What is Staphylococcus aureus colonization or carriage?  Colonization or carriage means that a person has the germ but is not sick from it.  These bacteria can be spread on the hands or when breathing or sneezing.    How is Staphylococcus aureus spread?  It is most often spread by  close contact with a person or item that carries it.    What happens if my culture is positive for Staphylococcus aureus?  Your doctor/medical team will use this information to guide any antibiotic treatment which may be necessary.  Regardless of the culture results, we will clean the inside of your nose with a betadine swab just before you have your surgery.      Will I get an infection if I have Staphylococcus aureus in my nose or on my skin?  Anyone can get an infection with Staphylococcus aureus.  However, the best way to reduce your risk of infection is to follow the instructions provided to you for the use of your CHG soap and dental rinse.        Patient Information: Oral/Dental Rinse    What is oral/dental rinse?   It is a mouthwash. It is a way of cleaning the mouth with a germ-killing solution before your surgery.  The solution contains chlorhexidine, commonly known as CHG.   It is used inside the mouth to kill a bacteria known as Staphylococcus aureus.  Let your doctor know if you are allergic to Chlorhexidine.    We have sent a prescription for CHG 0.12% mouthwash to your preferred pharmacy.  If you have not already, Please  your prescription and start using the day before before surgery.  Follow the instruction sheet provided to you at your CPM/PAT appointment. Please contact [facility] if you do not receive your CHG mouthwash prescription.     Why do I need to use CHG oral/dental rinse?  The CHG oral/dental rinse helps to kill a bacteria in your mouth known as Staphylococcus aureus.     This reduces the risk of infection at the surgical site.      Using your CHG oral/dental rinse  STEPS:  Use your CHG oral/dental rinse after you brush your teeth the night before (at bedtime) and the morning of your surgery.  Follow all directions on your prescription label.    Use the cap on the container to measure 15ml   Swish (gargle if you can) the mouthwash in your mouth for at least 30 seconds, (do not  swallow) and spit out  After you use your CHG rinse, do not rinse your mouth with water, drink or eat.  Please refer to the prescription label for the appropriate time to resume oral intake      What side effects might I have using the CHG oral/dental rinse?  CHG rinse will stick to plaque on the teeth.  Brush and floss just before use.  Teeth brushing will help avoid staining of plaque during use.      Patient Information: Home Preoperative Antibacterial Shower      What is a home preoperative antibacterial shower?  This shower is a way of cleaning the skin with a germ-killing solution before surgery.  The solution contains chlorhexidine, commonly known as CHG.  CHG is a skin cleanser with germ-killing ability.  Let your doctor know if you are allergic to chlorhexidine.    Why do I need to take a preoperative antibacterial shower?  Skin is not sterile.  It is best to try to make your skin as free of germs as possible before surgery.  Proper cleansing with a germ-killing soap before surgery can lower the number of germs on your skin.  This helps to reduce the risk of infection at the surgical site.  Following the instructions listed below will help you prepare your skin for surgery.      How do I use the solution?  Steps:  Begin using your CHG soap 5 days before your scheduled surgery on 11/28/24.    First, wash and rinse your hair using the CHG soap. Keep CHG soap away from ear canals and eyes.  Rinse completely, do not condition.  Hair extensions should be removed.  Wash your face with your normal soap and rinse.    Apply the CHG solution to a clean wet washcloth.  Turn the water off or move away from the water spray to avoid premature rinsing of the CHG soap as you are applying.   Firmly lather your entire body from the neck down.  Do not use on your face.  Pay special attention to the area(s) where your incision(s) will be located unless they are on your face.  Avoid scrubbing your skin too hard.  The important  point is to have the CHG soap sit on your skin for 3 minutes.    When the 3 minutes are up, turn on the water and rinse the CHG solution off your body completely.   DO NOT wash with regular soap after you have used the CHG soap solution  Pat yourself dry with a clean, freshly-laundered towel.  DO NOT apply powders, deodorants, or lotions.  Dress in clean, freshly laundered nightclothes.    Be sure to sleep with clean, freshly laundered sheets.  Be aware that CHG will cause stains on fabrics; if you wash them with bleach after use.  Rinse your washcloth and other linens that have contact with CHG completely.  Use only non-chlorine detergents to launder the items used.   The morning of surgery is the fifth day.  Repeat the above steps and dress in clean comfortable clothing     Whom should I contact if I have any questions regarding the use of CHG soap?  Call the [facility] at [phone] if you have any questions.

## 2024-11-19 LAB
EST. AVERAGE GLUCOSE BLD GHB EST-MCNC: 105 MG/DL
HBA1C MFR BLD: 5.3 %

## 2024-11-20 LAB — STAPHYLOCOCCUS SPEC CULT: NORMAL

## 2024-11-22 ENCOUNTER — APPOINTMENT (OUTPATIENT)
Dept: ORTHOPEDIC SURGERY | Facility: CLINIC | Age: 79
End: 2024-11-22
Payer: MEDICARE

## 2025-01-08 NOTE — PROGRESS NOTES
Subjective   Kun Castle Jr is a 79 y.o. male who presents for patient is here for follow-up.  HPI  Kun is a 77-year-old male with known history of hypertension dyslipidemia vitamin D deficiency rheumatoid arthritis patient is here for follow-up, with major complaint denies chest pain shortness of breath fever chills nausea vomiting constipation diarrhea dysuria urgency frequency.  Patient is fasting for blood work.  Review of Systems  10 system review pertinent as above  Objective     Visit Vitals  /80   Pulse 74   Temp 36.3 °C (97.3 °F) (Temporal)   Resp 14        Physical Exam  HEENT: Atraumatic normocephalic the pupils are equal and round and reactive to light the sclerae nonicteric extraocular motion are intact.  Neck: Is supple without JVD no carotid bruits the trachea is midline there are no masses pulses are equal and bilateral with normal upstroke.  Skin: Normal.  Skin good texture.  Moist.  Good turgor.  No lesions, no rashes.  Lymph: No lymphadenopathy appreciated, no masses, no lesions  Lungs: Are clear to auscultation and percussion, good breath sounds bilaterally, no rhonchi, no wheezing, good diaphragmatic excursion.  Heart: Normal rate and normal rhythm S1, S2, no S3, no gallop, murmur or rub.  Abdomen: Soft, nontender, no organomegaly, good bowel sounds.    Extremities: Full range of motion, good pulses bilateral.  No cyanosis, no clubbing or edema.  Neuro: Cranial nerves II-XII are grossly intact there is no sensory or motor deficits.  Able to move all extremities.      Assessment/Plan     Influenza HD 09/09/24    Rt Knee pain osteoarthritis  Pain severe at time  Will refer to orthopedic surgery    Left Knee replacement   Planned Feb 2025  Dr Ornelas     Rt arthroplasty 03/2024  Dr ISSAC Ornelas  Doing great      Continue with the low-fat, low-cholesterol diet,  I recommended Mediterranean diet, which include fish, chicken, vegetables and olive oil  Exercise daily for 30 minutes at  least 3 times a week  Continue home medications    Hypertension  No added salt diet, do not and salt to your food  Try to exercise every other day for 30 minutes  Continue current medications    Rheumatoid arthritis  With deformed joint integrity  Occasional pain        Problem List Items Addressed This Visit       Diverticulosis of large intestine without hemorrhage       Orders:    CBC w/5 Part Differential, [facility]           Relevant Orders    CBC w/5 Part Differential, [facility]    Hyperlipidemia       Orders:    Lipid Panel    AST    ALT           Relevant Orders    Lipid Panel    AST    ALT    Hypertension       Orders:    Basic Metabolic Panel           Relevant Orders    Basic Metabolic Panel    Vitamin D deficiency       Orders:    Vitamin D 25-Hydroxy,Total (for eval of Vitamin D levels)           Relevant Orders    Vitamin D 25-Hydroxy,Total (for eval of Vitamin D levels)    Nocturia       Orders:    PSA           Relevant Orders    PSA     Other Visit Diagnoses       Routine general medical examination at health care facility    -  Primary    Relevant Orders    1 Year Follow Up In Primary Care - Wellness Exam                Hayden Bro MD

## 2025-01-14 ENCOUNTER — APPOINTMENT (OUTPATIENT)
Dept: PRIMARY CARE | Facility: CLINIC | Age: 80
End: 2025-01-14
Payer: MEDICARE

## 2025-01-14 VITALS
TEMPERATURE: 97.3 F | SYSTOLIC BLOOD PRESSURE: 122 MMHG | WEIGHT: 192 LBS | HEART RATE: 74 BPM | BODY MASS INDEX: 27.49 KG/M2 | HEIGHT: 70 IN | DIASTOLIC BLOOD PRESSURE: 80 MMHG | RESPIRATION RATE: 14 BRPM

## 2025-01-14 DIAGNOSIS — E78.2 MODERATE MIXED HYPERLIPIDEMIA NOT REQUIRING STATIN THERAPY: ICD-10-CM

## 2025-01-14 DIAGNOSIS — K57.30 DIVERTICULOSIS OF LARGE INTESTINE WITHOUT HEMORRHAGE: ICD-10-CM

## 2025-01-14 DIAGNOSIS — Z00.00 ROUTINE GENERAL MEDICAL EXAMINATION AT HEALTH CARE FACILITY: Primary | ICD-10-CM

## 2025-01-14 DIAGNOSIS — E55.9 VITAMIN D DEFICIENCY: ICD-10-CM

## 2025-01-14 DIAGNOSIS — I10 PRIMARY HYPERTENSION: ICD-10-CM

## 2025-01-14 DIAGNOSIS — R35.1 NOCTURIA: ICD-10-CM

## 2025-01-14 LAB
25(OH)D3 SERPL-MCNC: 39 NG/ML (ref 30–100)
ALT SERPL W P-5'-P-CCNC: 18 U/L (ref 14–59)
ANION GAP SERPL CALC-SCNC: 14 MMOL/L (ref 10–20)
AST SERPL W P-5'-P-CCNC: 17 U/L (ref 15–37)
BASOPHILS # BLD AUTO: 0.02 X10*3/UL (ref 0.1–1.6)
BASOPHILS NFR BLD AUTO: 0.37 % (ref 0–0.3)
BUN SERPL-MCNC: 29 MG/DL (ref 7–18)
CALCIUM SERPL-MCNC: 8.6 MG/DL (ref 8.5–10.1)
CHLORIDE SERPL-SCNC: 105 MMOL/L (ref 98–107)
CHOLEST SERPL-MCNC: 151 MG/DL (ref 0–199)
CHOLESTEROL/HDL RATIO: 2.6 (ref 4.2–7)
CO2 SERPL-SCNC: 27 MMOL/L (ref 21–32)
CREAT SERPL-MCNC: 1.08 MG/DL (ref 0.6–1.1)
EGFRCR SERPLBLD CKD-EPI 2021: 69 ML/MIN/1.73M*2
EOSINOPHIL # BLD AUTO: 0.23 X10*3/UL (ref 0.04–0.5)
EOSINOPHIL NFR BLD AUTO: 4.18 % (ref 0.7–7)
ERYTHROCYTE [DISTWIDTH] IN BLOOD BY AUTOMATED COUNT: 13.4 % (ref 11.5–14.5)
GLUCOSE SERPL-MCNC: 92 MG/DL (ref 74–100)
HCT VFR BLD AUTO: 38.6 % (ref 38.4–51.3)
HDLC SERPL-MCNC: 59 MG/DL (ref 40–59)
HGB BLD-MCNC: 12.86 G/DL (ref 12.7–17)
IS PATIENT FASTING: YES
LDLC SERPL DIRECT ASSAY-MCNC: 80 MG/DL (ref 0–100)
LYMPHOCYTES # BLD AUTO: 0.97 X10*3/UL (ref 0–6)
LYMPHOCYTES NFR BLD AUTO: 17.61 % (ref 20.5–51.1)
MCH RBC QN AUTO: 32.4 PG (ref 26–32)
MCHC RBC AUTO-ENTMCNC: 33.3 G/DL (ref 31–38)
MCV RBC AUTO: 97.3 FL (ref 80–96)
MONOCYTES # BLD AUTO: 0.62 X10*3/UL (ref 1.6–24.9)
MONOCYTES NFR BLD AUTO: 11.28 % (ref 1.7–9.3)
NEUTROPHILS # BLD AUTO: 3.66 X10*3/UL (ref 1.4–6.5)
NEUTROPHILS NFR BLD AUTO: 66.56 % (ref 42.2–75.2)
PLATELET # BLD AUTO: 201.2 X10*3/UL (ref 150–450)
PMV BLD AUTO: 7.9 FL (ref 7.8–11)
POTASSIUM SERPL-SCNC: 4.7 MMOL/L (ref 3.5–5.1)
PSA SERPL-MCNC: 1.39 NG/ML
RBC # BLD AUTO: 3.97 X10*6/UL (ref 4.1–5.6)
SODIUM SERPL-SCNC: 141 MMOL/L (ref 136–145)
TRIGL SERPL-MCNC: 36 MG/DL
WBC # BLD AUTO: 5.5 X10*3/UL (ref 4.5–10.5)

## 2025-01-14 PROCEDURE — G0447 BEHAVIOR COUNSEL OBESITY 15M: HCPCS | Performed by: INTERNAL MEDICINE

## 2025-01-14 PROCEDURE — 99214 OFFICE O/P EST MOD 30 MIN: CPT | Performed by: INTERNAL MEDICINE

## 2025-01-14 PROCEDURE — 84450 TRANSFERASE (AST) (SGOT): CPT | Performed by: INTERNAL MEDICINE

## 2025-01-14 PROCEDURE — 1170F FXNL STATUS ASSESSED: CPT | Performed by: INTERNAL MEDICINE

## 2025-01-14 PROCEDURE — 84153 ASSAY OF PSA TOTAL: CPT | Performed by: INTERNAL MEDICINE

## 2025-01-14 PROCEDURE — 99497 ADVNCD CARE PLAN 30 MIN: CPT | Performed by: INTERNAL MEDICINE

## 2025-01-14 PROCEDURE — 1159F MED LIST DOCD IN RCRD: CPT | Performed by: INTERNAL MEDICINE

## 2025-01-14 PROCEDURE — 85025 COMPLETE CBC W/AUTO DIFF WBC: CPT | Performed by: INTERNAL MEDICINE

## 2025-01-14 PROCEDURE — 80061 LIPID PANEL: CPT | Performed by: INTERNAL MEDICINE

## 2025-01-14 PROCEDURE — 1158F ADVNC CARE PLAN TLK DOCD: CPT | Performed by: INTERNAL MEDICINE

## 2025-01-14 PROCEDURE — G0439 PPPS, SUBSEQ VISIT: HCPCS | Performed by: INTERNAL MEDICINE

## 2025-01-14 PROCEDURE — 80048 BASIC METABOLIC PNL TOTAL CA: CPT | Performed by: INTERNAL MEDICINE

## 2025-01-14 PROCEDURE — G0442 ANNUAL ALCOHOL SCREEN 15 MIN: HCPCS | Performed by: INTERNAL MEDICINE

## 2025-01-14 PROCEDURE — 84460 ALANINE AMINO (ALT) (SGPT): CPT | Performed by: INTERNAL MEDICINE

## 2025-01-14 PROCEDURE — G0446 INTENS BEHAVE THER CARDIO DX: HCPCS | Performed by: INTERNAL MEDICINE

## 2025-01-14 PROCEDURE — 1126F AMNT PAIN NOTED NONE PRSNT: CPT | Performed by: INTERNAL MEDICINE

## 2025-01-14 PROCEDURE — G0444 DEPRESSION SCREEN ANNUAL: HCPCS | Performed by: INTERNAL MEDICINE

## 2025-01-14 PROCEDURE — 3079F DIAST BP 80-89 MM HG: CPT | Performed by: INTERNAL MEDICINE

## 2025-01-14 PROCEDURE — 1123F ACP DISCUSS/DSCN MKR DOCD: CPT | Performed by: INTERNAL MEDICINE

## 2025-01-14 PROCEDURE — 82306 VITAMIN D 25 HYDROXY: CPT | Performed by: INTERNAL MEDICINE

## 2025-01-14 PROCEDURE — 1036F TOBACCO NON-USER: CPT | Performed by: INTERNAL MEDICINE

## 2025-01-14 PROCEDURE — 3074F SYST BP LT 130 MM HG: CPT | Performed by: INTERNAL MEDICINE

## 2025-01-14 PROCEDURE — G2211 COMPLEX E/M VISIT ADD ON: HCPCS | Performed by: INTERNAL MEDICINE

## 2025-01-14 PROCEDURE — 1160F RVW MEDS BY RX/DR IN RCRD: CPT | Performed by: INTERNAL MEDICINE

## 2025-01-14 ASSESSMENT — ENCOUNTER SYMPTOMS
DEPRESSION: 0
LOSS OF SENSATION IN FEET: 0
OCCASIONAL FEELINGS OF UNSTEADINESS: 0

## 2025-01-14 ASSESSMENT — GERIATRIC MINI NUTRITIONAL ASSESSMENT (MNA)
D HAS SUFFERED PSYCHOLOGICAL STRESS OR ACUTE DISEASE IN THE PAST 3 MONTHS?: NO
A HAS FOOD INTAKE DECLINED OVER THE PAST 3 MONTHS DUE TO LOSS OF APPETITE, DIGESTIVE PROBLEMS, CHEWING OR SWALLOWING DIFFICULTIES?: NO DECREASE IN FOOD INTAKE
C GENERAL MOBILITY: GOES OUT
B WEIGHT LOSS DURING THE LAST 3 MONTHS: NO WEIGHT LOSS

## 2025-01-14 ASSESSMENT — ACTIVITIES OF DAILY LIVING (ADL)
MANAGING FINANCES: INDEPENDENT
TAKING MEDICATION: INDEPENDENT
FEEDING YOURSELF: INDEPENDENT
WALKS IN HOME: INDEPENDENT
TOILETING: INDEPENDENT
HEARING - RIGHT EAR: FUNCTIONAL
USING TRANSPORTATION: INDEPENDENT
JUDGMENT_ADEQUATE_SAFELY_COMPLETE_DAILY_ACTIVITIES: YES
NEEDS ASSISTANCE WITH FOOD: INDEPENDENT
BATHING: INDEPENDENT
DOING HOUSEWORK: INDEPENDENT
DRESSING YOURSELF: INDEPENDENT
PILL BOX USED: NO
USING TELEPHONE: INDEPENDENT
STIL DRIVING: YES
ADEQUATE_TO_COMPLETE_ADL: YES
PATIENT'S MEMORY ADEQUATE TO SAFELY COMPLETE DAILY ACTIVITIES?: YES
EATING: INDEPENDENT
GROOMING: INDEPENDENT
HEARING - LEFT EAR: FUNCTIONAL
GROCERY SHOPPING: INDEPENDENT
PREPARING MEALS: INDEPENDENT

## 2025-01-14 ASSESSMENT — ANXIETY QUESTIONNAIRES
5. BEING SO RESTLESS THAT IT IS HARD TO SIT STILL: NOT AT ALL
GAD7 TOTAL SCORE: 0
4. TROUBLE RELAXING: NOT AT ALL
1. FEELING NERVOUS, ANXIOUS, OR ON EDGE: NOT AT ALL
2. NOT BEING ABLE TO STOP OR CONTROL WORRYING: NOT AT ALL
3. WORRYING TOO MUCH ABOUT DIFFERENT THINGS: NOT AT ALL
IF YOU CHECKED OFF ANY PROBLEMS ON THIS QUESTIONNAIRE, HOW DIFFICULT HAVE THESE PROBLEMS MADE IT FOR YOU TO DO YOUR WORK, TAKE CARE OF THINGS AT HOME, OR GET ALONG WITH OTHER PEOPLE: NOT DIFFICULT AT ALL
6. BECOMING EASILY ANNOYED OR IRRITABLE: NOT AT ALL
7. FEELING AFRAID AS IF SOMETHING AWFUL MIGHT HAPPEN: NOT AT ALL

## 2025-01-14 ASSESSMENT — PATIENT HEALTH QUESTIONNAIRE - PHQ9
1. LITTLE INTEREST OR PLEASURE IN DOING THINGS: NOT AT ALL
2. FEELING DOWN, DEPRESSED OR HOPELESS: NOT AT ALL
SUM OF ALL RESPONSES TO PHQ9 QUESTIONS 1 AND 2: 0

## 2025-01-14 ASSESSMENT — PAIN SCALES - GENERAL: PAINLEVEL_OUTOF10: 0-NO PAIN

## 2025-01-14 NOTE — PROGRESS NOTES
"Subjective   Reason for Visit: Kun Castle Jr is an 79 y.o. male here for a Medicare Wellness visit.   Reviewed all medications by prescribing practitioner or clinical pharmacist (such as prescriptions, OTCs, herbal therapies and supplements) and documented in the medical record.    HPI  Here for Medicare wellness takes his medication prescribed denies chest pain shortness of breath fever chill nausea vomiting constipation diarrhea this urgency frequency reports no falling  Patient Care Team:  Hayden Bro MD as PCP - General (Internal Medicine)  Hayden Bro MD as PCP - [facility] [place]  Violette Sosa RN as Registered Nurse (Orthopaedic Surgery)  Shanita Pope RN as Registered Nurse (Orthopaedic Surgery)     Review of Systems  10 system review pertinent as above  Objective   Vitals:  /80   Pulse 74   Temp 36.3 °C (97.3 °F) (Temporal)   Resp 14   Ht 1.778 m (5' 10\")   Wt 87.1 kg (192 lb)   BMI 27.55 kg/m²       Physical Exam  HEENT: Atraumatic normocephalic the pupils are equal and round and reactive to light the sclerae nonicteric extraocular motion are intact.  Neck: Is supple without JVD no carotid bruits the trachea is midline there are no masses pulses are equal and bilateral with normal upstroke.  Skin: Normal.  Skin good texture.  Moist.  Good turgor.  No lesions, no rashes.  Lymph: No lymphadenopathy appreciated, no masses, no lesions  Lungs: Are clear to auscultation and percussion, good breath sounds bilaterally, no rhonchi, no wheezing, good diaphragmatic excursion.  Heart: Normal rate and normal rhythm S1, S2, no S3, no gallop, murmur or rub.  Abdomen: Soft, nontender, no organomegaly, good bowel sounds.    Extremities: Full range of motion, good pulses bilateral.  No cyanosis, no clubbing or edema.  Neuro: Cranial nerves II-XII are grossly intact there is no sensory or motor deficits.  Able to move all extremities.  Assessment & Plan  Routine general medical examination " at health care facility    Orders:    1 Year Follow Up In Primary Care - Wellness Exam; Future    Moderate mixed hyperlipidemia not requiring statin therapy    Orders:    Lipid Panel    AST    ALT    Primary hypertension    Orders:    Basic Metabolic Panel    Vitamin D deficiency    Orders:    Vitamin D 25-Hydroxy,Total (for eval of Vitamin D levels)    Diverticulosis of large intestine without hemorrhage    Orders:    CBC w/5 Part Differential, [facility]    Nocturia    Orders:    PSA     Medicare wellness

## 2025-01-21 ENCOUNTER — PRE-ADMISSION TESTING (OUTPATIENT)
Dept: PREADMISSION TESTING | Facility: HOSPITAL | Age: 80
End: 2025-01-21
Payer: MEDICARE

## 2025-01-21 VITALS
BODY MASS INDEX: 27.52 KG/M2 | HEART RATE: 77 BPM | TEMPERATURE: 97.9 F | OXYGEN SATURATION: 99 % | WEIGHT: 192.2 LBS | RESPIRATION RATE: 16 BRPM | DIASTOLIC BLOOD PRESSURE: 83 MMHG | SYSTOLIC BLOOD PRESSURE: 148 MMHG | HEIGHT: 70 IN

## 2025-01-21 DIAGNOSIS — Z01.818 PRE-OP EVALUATION: Primary | ICD-10-CM

## 2025-01-21 PROCEDURE — 99203 OFFICE O/P NEW LOW 30 MIN: CPT | Performed by: NURSE PRACTITIONER

## 2025-01-21 PROCEDURE — 87081 CULTURE SCREEN ONLY: CPT | Mod: BEALAB

## 2025-01-21 RX ORDER — CHLORHEXIDINE GLUCONATE ORAL RINSE 1.2 MG/ML
15 SOLUTION DENTAL DAILY
Qty: 30 ML | Refills: 0 | Status: SHIPPED | OUTPATIENT
Start: 2025-01-21 | End: 2025-01-23

## 2025-01-21 RX ORDER — CHLORHEXIDINE GLUCONATE 40 MG/ML
SOLUTION TOPICAL DAILY PRN
Qty: 470 ML | Refills: 0 | Status: SHIPPED | OUTPATIENT
Start: 2025-01-21

## 2025-01-21 ASSESSMENT — DUKE ACTIVITY SCORE INDEX (DASI)
DASI METS SCORE: 6.3
CAN YOU WALK A BLOCK OR TWO ON LEVEL GROUND: YES
CAN YOU DO LIGHT WORK AROUND THE HOUSE LIKE DUSTING OR WASHING DISHES: YES
CAN YOU RUN A SHORT DISTANCE: NO
CAN YOU PARTICIPATE IN STRENOUS SPORTS LIKE SWIMMING, SINGLES TENNIS, FOOTBALL, BASKETBALL, OR SKIING: NO
CAN YOU PARTICIPATE IN MODERATE RECREATIONAL ACTIVITIES LIKE GOLF, BOWLING, DANCING, DOUBLES TENNIS OR THROWING A BASEBALL OR FOOTBALL: NO
CAN YOU DO MODERATE WORK AROUND THE HOUSE LIKE VACUUMING, SWEEPING FLOORS OR CARRYING GROCERIES: YES
CAN YOU DO YARD WORK LIKE RAKING LEAVES, WEEDING OR PUSHING A MOWER: YES
TOTAL_SCORE: 28.7
CAN YOU HAVE SEXUAL RELATIONS: YES
CAN YOU WALK INDOORS, SUCH AS AROUND YOUR HOUSE: YES
CAN YOU CLIMB A FLIGHT OF STAIRS OR WALK UP A HILL: YES
CAN YOU TAKE CARE OF YOURSELF (EAT, DRESS, BATHE, OR USE TOILET): YES
CAN YOU DO HEAVY WORK AROUND THE HOUSE LIKE SCRUBBING FLOORS OR LIFTING AND MOVING HEAVY FURNITURE: NO

## 2025-01-21 ASSESSMENT — ENCOUNTER SYMPTOMS
CONSTITUTIONAL NEGATIVE: 1
ARTHRALGIAS: 1
NEUROLOGICAL NEGATIVE: 1
ENDOCRINE NEGATIVE: 1
NECK NEGATIVE: 1
CARDIOVASCULAR NEGATIVE: 1
EYES NEGATIVE: 1
GASTROINTESTINAL NEGATIVE: 1
RESPIRATORY NEGATIVE: 1

## 2025-01-21 ASSESSMENT — PAIN - FUNCTIONAL ASSESSMENT: PAIN_FUNCTIONAL_ASSESSMENT: 0-10

## 2025-01-21 ASSESSMENT — LIFESTYLE VARIABLES
SMOKING_STATUS: NONSMOKER
SMOKING_STATUS: NONSMOKER

## 2025-01-21 ASSESSMENT — PAIN SCALES - GENERAL: PAINLEVEL_OUTOF10: 0 - NO PAIN

## 2025-01-21 NOTE — CPM/PAT H&P
CPM/PAT Evaluation       Name: Kun Castle Jr (Kun Castle Jr)  /Age: 1945/79 y.o.     Visit Type:   In-Person       Chief Complaint: OA L knee    HPI 78 yo male who is referred to PAT by Dr Ornelas for evaluation in advance of his L knee resurfacing arthroplasty total 25. He has failed conservative therapy. 3/2024 he had a R TKR. He has done    See PMH below    Past Medical History:   Diagnosis Date    Arthritis     Asymptomatic varicose veins of bilateral lower extremities 2015    Varicose veins of legs    Cataract     Diverticulosis     GERD (gastroesophageal reflux disease)     Hiatal hernia     Hyperlipidemia     Hypertension     Joint pain     Nephrolithiasis     Nocturia 2016    Nocturia    Orthostatic hypotension 2015    Sympathotonic orthostatic hypotension    Pain in right knee 2015    Recurrent knee pain, right    Personal history of diseases of the skin and subcutaneous tissue 2015    History of rhinophyma    Personal history of other diseases of the digestive system     History of gastroesophageal reflux (GERD)    Personal history of other diseases of the nervous system and sense organs 10/29/2015    History of sciatica    Rheumatoid arthritis     Sciatica, unspecified side 2015    Acute sciatica    Vitamin D deficiency, unspecified 2016    Vitamin D deficiency       Past Surgical History:   Procedure Laterality Date    ADENOIDECTOMY      APPENDECTOMY      CATARACT EXTRACTION      CATARACT EXTRACTION, BILATERAL      CHOLECYSTECTOMY      COLONOSCOPY  2014    Colonoscopy (Fiberoptic)    HERNIA REPAIR      JOINT REPLACEMENT      KNEE ARTHROPLASTY      SINUS SURGERY      TENDON REPAIR Left     left hand    TONSILLECTOMY         Patient Sexual activity questions deferred to the physician.    Family History   Problem Relation Name Age of Onset    Diabetes Father      Cancer Brother         No Known Allergies    Medication Documentation  "Review Audit       Reviewed by Juany Flores, RN (Registered Nurse) on 25 at 1410      Medication Order Taking? Sig Documenting Provider Last Dose Status   b complex (B Complex 1, with folic acid,) 0.4 mg tablet [id number] Yes Take 1 tablet by mouth once daily. Historical Provider, MD Past Week Active   cholecalciferol (Vitamin D-3) 50 MCG (2000 UT) tablet [id number] Yes Take 1 tablet (2,000 Units) by mouth once daily. Historical Provider, MD Past Week Active   losartan (Cozaar) 50 mg tablet [id number] Yes Take 1 tablet (50 mg) by mouth once daily. EVENING Hayden Bro MD 2025 Evening Active   multivitamin tablet [id number] Yes Take 1 tablet by mouth once daily. SENIOR MULTIVITAMIN Historical Provider, MD Past Week Active   pantoprazole (ProtoNix) 40 mg EC tablet [id number]  Take 1 tablet (40 mg) by mouth once daily in the morning. Take before meals. Do not crush, chew, or split. Katie Ornelas MD   25 [id number]                    PAT ROS:   Constitutional:   neg    Neuro/Psych:   neg    Eyes:   neg    Ears:   neg    Nose:   neg    Mouth:   neg    Throat:   neg    Neck:   neg    Cardio:   neg    Respiratory:   neg    Endocrine:   neg    GI:   neg    :   neg    Musculoskeletal:    See HPI   arthralgias  Hematologic:   neg    Skin:  neg        Physical Exam  Vitals and nursing note reviewed. Physical exam within normal limits.          PAT AIRWAY:   Airway:     Mallampati::  II    TM distance::  >3 FB    Neck ROM::  Full        Visit Vitals  /83   Pulse 77   Temp 36.6 °C (97.9 °F) (Temporal)   Resp 16   Ht 1.778 m (5' 10\")   Wt 87.2 kg (192 lb 3.2 oz)   SpO2 99%   BMI 27.58 kg/m²   Smoking Status Never   BSA 2.08 m²       DASI Risk Score      Flowsheet Row Pre-Admission Testing from 2025 in [facility] Pre-Admission Testing from 2024 in [facility]   Can you take care of yourself (eat, dress, bathe, or use toilet)?  2.75 filed at 2025 1458 2.75 " filed at 11/18/2024 1213   Can you walk indoors, such as around your house? 1.75 filed at 01/21/2025 1458 1.75 filed at 11/18/2024 1213   Can you walk a block or two on level ground?  2.75 filed at 01/21/2025 1458 2.75 filed at 11/18/2024 1213   Can you climb a flight of stairs or walk up a hill? 5.5 filed at 01/21/2025 1458 5.5 filed at 11/18/2024 1213   Can you run a short distance? 0 filed at 01/21/2025 1458 0 filed at 11/18/2024 1213   Can you do light work around the house like dusting or washing dishes? 2.7 filed at 01/21/2025 1458 2.7 filed at 11/18/2024 1213   Can you do moderate work around the house like vacuuming, sweeping floors or carrying groceries? 3.5 filed at 01/21/2025 1458 3.5 filed at 11/18/2024 1213   Can you do heavy work around the house like scrubbing floors or lifting and moving heavy furniture?  0 filed at 01/21/2025 1458 0 filed at 11/18/2024 1213   Can you do yard work like raking leaves, weeding or pushing a mower? 4.5 filed at 01/21/2025 1458 4.5 filed at 11/18/2024 1213   Can you have sexual relations? 5.25 filed at 01/21/2025 1458 5.25 filed at 11/18/2024 1213   Can you participate in moderate recreational activities like golf, bowling, dancing, doubles tennis or throwing a baseball or football? 0 filed at 01/21/2025 1458 0 filed at 11/18/2024 1213   Can you participate in strenous sports like swimming, singles tennis, football, basketball, or skiing? 0 filed at 01/21/2025 1458 0 filed at 11/18/2024 1213   DASI SCORE 28.7 filed at 01/21/2025 1458 28.7 filed at 11/18/2024 1213   METS Score (Will be calculated only when all the questions are answered) 6.3 filed at 01/21/2025 1458 6.3 filed at 11/18/2024 1213          Caprini DVT Assessment      Flowsheet Row Pre-Admission Testing from 1/21/2025 in [facility] Pre-Admission Testing from 11/18/2024 in [facility]   DVT Score (IF A SCORE IS NOT CALCULATING, MUST SELECT A BMI TO COMPLETE) 9 filed at  01/21/2025 1458 7 filed at 11/18/2024 1209   Surgical Factors Elective major lower extremity arthroplasty filed at 01/21/2025 1458 Major surgery planned, lasting 2-3 hours filed at 11/18/2024 1209   BMI (BMI MUST BE CHOSEN) 30 or less filed at 01/21/2025 1458 30 or less filed at 11/18/2024 1209          Modified Frailty Index    No data to display       CHADS2 Stroke Risk  Current as of 3 days ago        N/A 3 to 100%: High Risk   2 to < 3%: Medium Risk   0 to < 2%: Low Risk     Last Change: N/A          This score determines the patient's risk of having a stroke if the patient has atrial fibrillation.        This score is not applicable to this patient. Components are not calculated.          Revised Cardiac Risk Index      Flowsheet Row Pre-Admission Testing from 1/21/2025 in [facility] Pre-Admission Testing from 11/18/2024 in [facility]   High-Risk Surgery (Intraperitoneal, Intrathoracic,Suprainguinal vascular) 0 filed at 01/21/2025 1458 0 filed at 11/18/2024 1214   History of ischemic heart disease (History of MI, History of positive exercuse test, Current chest paint considered due to myocardial ischemia, Use of nitrate therapy, ECG with pathological Q Waves) 0 filed at 01/21/2025 1458 0 filed at 11/18/2024 1214   History of congestive heart failure (pulmonary edemia, bilateral rales or S3 gallop, Paroxysmal nocturnal dyspnea, CXR showing pulmonary vascular redistribution) 0 filed at 01/21/2025 1458 0 filed at 11/18/2024 1214   History of cerebrovascular disease (Prior TIA or stroke) 0 filed at 01/21/2025 1458 0 filed at 11/18/2024 1214   Pre-operative insulin treatment 0 filed at 01/21/2025 1458 0 filed at 11/18/2024 1214   Pre-operative creatinine>2 mg/dl 0 filed at 01/21/2025 1458 0 filed at 11/18/2024 1214   Revised Cardiac Risk Calculator 0 filed at 01/21/2025 1458 0 filed at 11/18/2024 1214          Apfel Simplified Score      Flowsheet Row Pre-Admission Testing from  1/21/2025 in [facility]   Smoking status 1 filed at 01/21/2025 1458   History of motion sickness or PONV  0 filed at 01/21/2025 1458   Use of postoperative opioids 0 filed at 01/21/2025 1458   Gender - Female 0=No filed at 01/21/2025 1458   Apfel Simplified Score Calculator 1 filed at 01/21/2025 1458          Risk Analysis Index Results This Encounter    No data found in the last 10 encounters.       Stop Bang Score      Flowsheet Row Pre-Admission Testing from 1/21/2025 in [facility] Pre-Admission Testing from 11/18/2024 in [facility]   Do you snore loudly? 0 filed at 01/21/2025 1414 0 filed at 11/18/2024 1102   Do you often feel tired or fatigued after your sleep? 0 filed at 01/21/2025 1414 0 filed at 11/18/2024 1102   Has anyone ever observed you stop breathing in your sleep? 0 filed at 01/21/2025 1414 0 filed at 11/18/2024 1102   Do you have or are you being treated for high blood pressure? 1 filed at 01/21/2025 1414 1 filed at 11/18/2024 1102   Recent BMI (Calculated) 27.6 filed at 01/21/2025 1414 28.3 filed at 11/18/2024 1102   Is BMI greater than 35 kg/m2? 0=No filed at 01/21/2025 1414 0=No filed at 11/18/2024 1102   Age older than 50 years old? 1=Yes filed at 01/21/2025 1414 1=Yes filed at 11/18/2024 1102   Is your neck circumference greater than 17 inches (Male) or 16 inches (Female)? 0 filed at 01/21/2025 1414 0 filed at 11/18/2024 1102   Gender - Male 1=Yes filed at 01/21/2025 1414 1=Yes filed at 11/18/2024 1102   STOP-BANG Total Score 3 filed at 01/21/2025 1414 3 filed at 11/18/2024 1102          Prodigy: High Risk  Total Score: 24              Prodigy Age Score      Prodigy Gender Score          ARISCAT Score for Postoperative Pulmonary Complications      Flowsheet Row Pre-Admission Testing from 1/21/2025 in [facility] Pre-Admission Testing from 11/18/2024 in [facility]   Age Calculated Score 16 filed at 01/21/2025 1452  16 filed at 11/18/2024 1214   Preoperative SpO2 0 filed at 01/21/2025 1459 0 filed at 11/18/2024 1214   Respiratory infection in the last month Either upper or lower (i.e., URI, bronchitis, pneumonia), with fever and antibiotic treatment 0 filed at 01/21/2025 1459 0 filed at 11/18/2024 1214   Preoperative anemia (Hgb less than 10 g/dl) 0 filed at 01/21/2025 1459 0 filed at 11/18/2024 1214   Surgical incision  0 filed at 01/21/2025 1459 0 filed at 11/18/2024 1214   Duration of surgery  16 filed at 01/21/2025 1459 16 filed at 11/18/2024 1214   Emergency Procedure  0 filed at 01/21/2025 1459 0 filed at 11/18/2024 1214   ARISCAT Total Score  32 filed at 01/21/2025 1459 32 filed at 11/18/2024 1214          Azael Perioperative Risk for Myocardial Infarction or Cardiac Arrest (TOYIN)      Flowsheet Row Pre-Admission Testing from 1/21/2025 in [facility] Pre-Admission Testing from 11/18/2024 in [facility]   Calculated Age Score 1.62 filed at 01/21/2025 1501 1.62 filed at 11/18/2024 1218   Functional Status  0 filed at 01/21/2025 1501 0 filed at 11/18/2024 1218   ASA Class  -3.29 filed at 01/21/2025 1501 -3.29 filed at 11/18/2024 1218   Creatinine 0 filed at 01/21/2025 1501 0 filed at 11/18/2024 1218   Type of Procedure  0.80 filed at 01/21/2025 1501 0.80 filed at 11/18/2024 1218   TOYIN Total Score  -6.12 filed at 01/21/2025 1501 -6.12 filed at 11/18/2024 1218   TOYIN % 0.22 filed at 01/21/2025 1501 0.22 filed at 11/18/2024 1218          Assessment and Plan   78 yo male presents to PAT for risk assessment and preoperative optimization in advance of knee surgery    Today BP is slightly elevated, he is afebrile, Pulse ox is 99% on room air pain with walking 4-5.     Neuro:  No diagnosis or significant findings on chart review or clinical presentation and evaluation.   The patient is at an increased risk for post operative delirium secondary to age >/= 65.  Preoperative brain exercise  discussed  "with patient. The patient is at an increased risk for perioperative stroke secondary age and co morbidities    HEENT/Airway:  No diagnosis or significant findings on chart review or clinical presentation and evaluation.     Denies chest pain, shortness of breathe, dizziness, palpations, or lightheadedness    HTN BP stable, cont losartan, last dose 2/9/25 since an evening medication     Lab Results   Component Value Date    CHOL 151 01/14/2025     Lab Results   Component Value Date    HDL 59.0 01/14/2025     No results found for: \"LDLCALC\"  Lab Results   Component Value Date    TRIG 36 01/14/2025     No components found for: \"CHOLHDL\"    METS are  6.3, RCRI is 0 (3.9% risk for 30 day postoperative MACE)  TOYIN indicates a .22% risk of intraoperative or 30 day postoperative MACE  No additional preoperative testing is currently indicated.    EKG - 11/18/2024 SR with 1st degree AVB, RSR or QR pattern in V1 suggests RV conduction delay, L ant fascicular block    Pulmonary:  No diagnosis or significant findings on chart review or clinical presentation and evaluation.     PRODIGY: High risk for opioid induced respiratory depression  Discussed smoking cessation and deep breathing handout given    Renal:   No diagnosis or significant findings on chart review, or clinical presentation and evaluation.     Pt at Low risk for perioperative OZZY based on Dynamic Predictive Scoring Tool for Perioperative OZZY  Lab Results   Component Value Date    GLUCOSE 92 01/14/2025    CALCIUM 8.6 01/14/2025     01/14/2025    K 4.7 01/14/2025    CO2 27 01/14/2025     01/14/2025    BUN 29 (H) 01/14/2025    CREATININE 1.08 01/14/2025       Endocrine:  No diagnosis or significant findings on chart review or clinical presentation and evaluation.     Lab Results   Component Value Date    HGBA1C 5.3 11/18/2024       Hematologic:  No diagnosis or significant findings on chart review or clinical presentation and evaluation.  Lab Results "   Component Value Date    WBC 5.50 01/14/2025    HGB 12.86 01/14/2025    HCT 38.6 01/14/2025    MCV 97.3 (H) 01/14/2025    .2 01/14/2025       CAPRINI SCORE 9    Pt supplied education/VTE handout    Gastrointestinal:   No diagnosis or significant findings on chart review or clinical presentation and evaluation.   EAT-10 score of 0 - self-perceived oropharyngeal dysphagia scale (0-40)      :  No diagnosis or significant findings on chart review or clinical presentation and evaluation.     Infectious disease:   No diagnosis or significant findings on chart review or clinical presentation and evaluation.   Prescription provided for CHG body wash and dental rinse. CHG use instructions reviewed and provided to patient.  Staph screen collected    Musculoskeletal:   See HPI  Last dose of NSAIDS  2/3/25  Instructed if  no issues with your liver you may take Tylenol 2-500 mg tablets every 8 hrs around the clock for pain including morning of surgery with a sip of water    Preoperative risk assessment  ASA II  NSQIP - Predicted length of stay days 0  PAT Testing - mrsa    Anesthesia:  No anesthesia complications    denies dental issues  Smoker-denies  Drugs-denies  ETOH-rare  denies personal/family issues with Anesthesia  No nickel, shell fish, or iodine allergies    CHLORHEXIDINE .12% DENTAL RINSE E PRESCIRBED PER  INFECTION PREVENTION PROTOCOL. PATIENT EDUCATED   Patient advised to call PAT if does not receive Mouthwash    Face to Face patient contact time 30 min    MAHI Nolasco 1/21/2025 3:03 PM

## 2025-01-21 NOTE — H&P (VIEW-ONLY)
CPM/PAT Evaluation       Name: Kun Castle Jr (Kun Castle Jr)  /Age: 1945/79 y.o.     Visit Type:   In-Person       Chief Complaint: OA L knee    HPI 80 yo male who is referred to PAT by Dr Ornelas for evaluation in advance of his L knee resurfacing arthroplasty total 25. He has failed conservative therapy. 3/2024 he had a R TKR. He has done    See PMH below    Past Medical History:   Diagnosis Date    Arthritis     Asymptomatic varicose veins of bilateral lower extremities 2015    Varicose veins of legs    Cataract     Diverticulosis     GERD (gastroesophageal reflux disease)     Hiatal hernia     Hyperlipidemia     Hypertension     Joint pain     Nephrolithiasis     Nocturia 2016    Nocturia    Orthostatic hypotension 2015    Sympathotonic orthostatic hypotension    Pain in right knee 2015    Recurrent knee pain, right    Personal history of diseases of the skin and subcutaneous tissue 2015    History of rhinophyma    Personal history of other diseases of the digestive system     History of gastroesophageal reflux (GERD)    Personal history of other diseases of the nervous system and sense organs 10/29/2015    History of sciatica    Rheumatoid arthritis     Sciatica, unspecified side 2015    Acute sciatica    Vitamin D deficiency, unspecified 2016    Vitamin D deficiency       Past Surgical History:   Procedure Laterality Date    ADENOIDECTOMY      APPENDECTOMY      CATARACT EXTRACTION      CATARACT EXTRACTION, BILATERAL      CHOLECYSTECTOMY      COLONOSCOPY  2014    Colonoscopy (Fiberoptic)    HERNIA REPAIR      JOINT REPLACEMENT      KNEE ARTHROPLASTY      SINUS SURGERY      TENDON REPAIR Left     left hand    TONSILLECTOMY         Patient Sexual activity questions deferred to the physician.    Family History   Problem Relation Name Age of Onset    Diabetes Father      Cancer Brother         No Known Allergies    Medication Documentation  "Review Audit       Reviewed by Juany Flores, RN (Registered Nurse) on 25 at 1410      Medication Order Taking? Sig Documenting Provider Last Dose Status   b complex (B Complex 1, with folic acid,) 0.4 mg tablet [id number] Yes Take 1 tablet by mouth once daily. Historical Provider, MD Past Week Active   cholecalciferol (Vitamin D-3) 50 MCG (2000 UT) tablet [id number] Yes Take 1 tablet (2,000 Units) by mouth once daily. Historical Provider, MD Past Week Active   losartan (Cozaar) 50 mg tablet [id number] Yes Take 1 tablet (50 mg) by mouth once daily. EVENING Hayden Bro MD 2025 Evening Active   multivitamin tablet [id number] Yes Take 1 tablet by mouth once daily. SENIOR MULTIVITAMIN Historical Provider, MD Past Week Active   pantoprazole (ProtoNix) 40 mg EC tablet [id number]  Take 1 tablet (40 mg) by mouth once daily in the morning. Take before meals. Do not crush, chew, or split. Katie Ornelas MD   25 2359                    PAT ROS:   Constitutional:   neg    Neuro/Psych:   neg    Eyes:   neg    Ears:   neg    Nose:   neg    Mouth:   neg    Throat:   neg    Neck:   neg    Cardio:   neg    Respiratory:   neg    Endocrine:   neg    GI:   neg    :   neg    Musculoskeletal:    See HPI   arthralgias  Hematologic:   neg    Skin:  neg        Physical Exam  Vitals and nursing note reviewed. Physical exam within normal limits.          PAT AIRWAY:   Airway:     Mallampati::  II    TM distance::  >3 FB    Neck ROM::  Full        Visit Vitals  /83   Pulse 77   Temp 36.6 °C (97.9 °F) (Temporal)   Resp 16   Ht 1.778 m (5' 10\")   Wt 87.2 kg (192 lb 3.2 oz)   SpO2 99%   BMI 27.58 kg/m²   Smoking Status Never   BSA 2.08 m²       DASI Risk Score      Flowsheet Row Pre-Admission Testing from 2025 in [facility] Pre-Admission Testing from 2024 in [facility]   Can you take care of yourself (eat, dress, bathe, or use toilet)?  2.75 filed at 2025 1458 2.75 " filed at 11/18/2024 1213   Can you walk indoors, such as around your house? 1.75 filed at 01/21/2025 1458 1.75 filed at 11/18/2024 1213   Can you walk a block or two on level ground?  2.75 filed at 01/21/2025 1458 2.75 filed at 11/18/2024 1213   Can you climb a flight of stairs or walk up a hill? 5.5 filed at 01/21/2025 1458 5.5 filed at 11/18/2024 1213   Can you run a short distance? 0 filed at 01/21/2025 1458 0 filed at 11/18/2024 1213   Can you do light work around the house like dusting or washing dishes? 2.7 filed at 01/21/2025 1458 2.7 filed at 11/18/2024 1213   Can you do moderate work around the house like vacuuming, sweeping floors or carrying groceries? 3.5 filed at 01/21/2025 1458 3.5 filed at 11/18/2024 1213   Can you do heavy work around the house like scrubbing floors or lifting and moving heavy furniture?  0 filed at 01/21/2025 1458 0 filed at 11/18/2024 1213   Can you do yard work like raking leaves, weeding or pushing a mower? 4.5 filed at 01/21/2025 1458 4.5 filed at 11/18/2024 1213   Can you have sexual relations? 5.25 filed at 01/21/2025 1458 5.25 filed at 11/18/2024 1213   Can you participate in moderate recreational activities like golf, bowling, dancing, doubles tennis or throwing a baseball or football? 0 filed at 01/21/2025 1458 0 filed at 11/18/2024 1213   Can you participate in strenous sports like swimming, singles tennis, football, basketball, or skiing? 0 filed at 01/21/2025 1458 0 filed at 11/18/2024 1213   DASI SCORE 28.7 filed at 01/21/2025 1458 28.7 filed at 11/18/2024 1213   METS Score (Will be calculated only when all the questions are answered) 6.3 filed at 01/21/2025 1458 6.3 filed at 11/18/2024 1213          Caprini DVT Assessment      Flowsheet Row Pre-Admission Testing from 1/21/2025 in [facility] Pre-Admission Testing from 11/18/2024 in [facility]   DVT Score (IF A SCORE IS NOT CALCULATING, MUST SELECT A BMI TO COMPLETE) 9 filed at  01/21/2025 1458 7 filed at 11/18/2024 1209   Surgical Factors Elective major lower extremity arthroplasty filed at 01/21/2025 1458 Major surgery planned, lasting 2-3 hours filed at 11/18/2024 1209   BMI (BMI MUST BE CHOSEN) 30 or less filed at 01/21/2025 1458 30 or less filed at 11/18/2024 1209          Modified Frailty Index    No data to display       CHADS2 Stroke Risk  Current as of 3 days ago        N/A 3 to 100%: High Risk   2 to < 3%: Medium Risk   0 to < 2%: Low Risk     Last Change: N/A          This score determines the patient's risk of having a stroke if the patient has atrial fibrillation.        This score is not applicable to this patient. Components are not calculated.          Revised Cardiac Risk Index      Flowsheet Row Pre-Admission Testing from 1/21/2025 in [facility] Pre-Admission Testing from 11/18/2024 in [facility]   High-Risk Surgery (Intraperitoneal, Intrathoracic,Suprainguinal vascular) 0 filed at 01/21/2025 1458 0 filed at 11/18/2024 1214   History of ischemic heart disease (History of MI, History of positive exercuse test, Current chest paint considered due to myocardial ischemia, Use of nitrate therapy, ECG with pathological Q Waves) 0 filed at 01/21/2025 1458 0 filed at 11/18/2024 1214   History of congestive heart failure (pulmonary edemia, bilateral rales or S3 gallop, Paroxysmal nocturnal dyspnea, CXR showing pulmonary vascular redistribution) 0 filed at 01/21/2025 1458 0 filed at 11/18/2024 1214   History of cerebrovascular disease (Prior TIA or stroke) 0 filed at 01/21/2025 1458 0 filed at 11/18/2024 1214   Pre-operative insulin treatment 0 filed at 01/21/2025 1458 0 filed at 11/18/2024 1214   Pre-operative creatinine>2 mg/dl 0 filed at 01/21/2025 1458 0 filed at 11/18/2024 1214   Revised Cardiac Risk Calculator 0 filed at 01/21/2025 1458 0 filed at 11/18/2024 1214          Apfel Simplified Score      Flowsheet Row Pre-Admission Testing from  1/21/2025 in [facility]   Smoking status 1 filed at 01/21/2025 1458   History of motion sickness or PONV  0 filed at 01/21/2025 1458   Use of postoperative opioids 0 filed at 01/21/2025 1458   Gender - Female 0=No filed at 01/21/2025 1458   Apfel Simplified Score Calculator 1 filed at 01/21/2025 1458          Risk Analysis Index Results This Encounter    No data found in the last 10 encounters.       Stop Bang Score      Flowsheet Row Pre-Admission Testing from 1/21/2025 in [facility] Pre-Admission Testing from 11/18/2024 in [facility]   Do you snore loudly? 0 filed at 01/21/2025 1414 0 filed at 11/18/2024 1102   Do you often feel tired or fatigued after your sleep? 0 filed at 01/21/2025 1414 0 filed at 11/18/2024 1102   Has anyone ever observed you stop breathing in your sleep? 0 filed at 01/21/2025 1414 0 filed at 11/18/2024 1102   Do you have or are you being treated for high blood pressure? 1 filed at 01/21/2025 1414 1 filed at 11/18/2024 1102   Recent BMI (Calculated) 27.6 filed at 01/21/2025 1414 28.3 filed at 11/18/2024 1102   Is BMI greater than 35 kg/m2? 0=No filed at 01/21/2025 1414 0=No filed at 11/18/2024 1102   Age older than 50 years old? 1=Yes filed at 01/21/2025 1414 1=Yes filed at 11/18/2024 1102   Is your neck circumference greater than 17 inches (Male) or 16 inches (Female)? 0 filed at 01/21/2025 1414 0 filed at 11/18/2024 1102   Gender - Male 1=Yes filed at 01/21/2025 1414 1=Yes filed at 11/18/2024 1102   STOP-BANG Total Score 3 filed at 01/21/2025 1414 3 filed at 11/18/2024 1102          Prodigy: High Risk  Total Score: 24              Prodigy Age Score      Prodigy Gender Score          ARISCAT Score for Postoperative Pulmonary Complications      Flowsheet Row Pre-Admission Testing from 1/21/2025 in [facility] Pre-Admission Testing from 11/18/2024 in [facility]   Age Calculated Score 16 filed at 01/21/2025 1456  16 filed at 11/18/2024 1214   Preoperative SpO2 0 filed at 01/21/2025 1459 0 filed at 11/18/2024 1214   Respiratory infection in the last month Either upper or lower (i.e., URI, bronchitis, pneumonia), with fever and antibiotic treatment 0 filed at 01/21/2025 1459 0 filed at 11/18/2024 1214   Preoperative anemia (Hgb less than 10 g/dl) 0 filed at 01/21/2025 1459 0 filed at 11/18/2024 1214   Surgical incision  0 filed at 01/21/2025 1459 0 filed at 11/18/2024 1214   Duration of surgery  16 filed at 01/21/2025 1459 16 filed at 11/18/2024 1214   Emergency Procedure  0 filed at 01/21/2025 1459 0 filed at 11/18/2024 1214   ARISCAT Total Score  32 filed at 01/21/2025 1459 32 filed at 11/18/2024 1214          Azael Perioperative Risk for Myocardial Infarction or Cardiac Arrest (TOYIN)      Flowsheet Row Pre-Admission Testing from 1/21/2025 in [facility] Pre-Admission Testing from 11/18/2024 in [facility]   Calculated Age Score 1.62 filed at 01/21/2025 1501 1.62 filed at 11/18/2024 1218   Functional Status  0 filed at 01/21/2025 1501 0 filed at 11/18/2024 1218   ASA Class  -3.29 filed at 01/21/2025 1501 -3.29 filed at 11/18/2024 1218   Creatinine 0 filed at 01/21/2025 1501 0 filed at 11/18/2024 1218   Type of Procedure  0.80 filed at 01/21/2025 1501 0.80 filed at 11/18/2024 1218   TOYIN Total Score  -6.12 filed at 01/21/2025 1501 -6.12 filed at 11/18/2024 1218   TOYIN % 0.22 filed at 01/21/2025 1501 0.22 filed at 11/18/2024 1218          Assessment and Plan   78 yo male presents to PAT for risk assessment and preoperative optimization in advance of knee surgery    Today BP is slightly elevated, he is afebrile, Pulse ox is 99% on room air pain with walking 4-5.     Neuro:  No diagnosis or significant findings on chart review or clinical presentation and evaluation.   The patient is at an increased risk for post operative delirium secondary to age >/= 65.  Preoperative brain exercise  discussed  "with patient. The patient is at an increased risk for perioperative stroke secondary age and co morbidities    HEENT/Airway:  No diagnosis or significant findings on chart review or clinical presentation and evaluation.     Denies chest pain, shortness of breathe, dizziness, palpations, or lightheadedness    HTN BP stable, cont losartan, last dose 2/9/25 since an evening medication     Lab Results   Component Value Date    CHOL 151 01/14/2025     Lab Results   Component Value Date    HDL 59.0 01/14/2025     No results found for: \"LDLCALC\"  Lab Results   Component Value Date    TRIG 36 01/14/2025     No components found for: \"CHOLHDL\"    METS are  6.3, RCRI is 0 (3.9% risk for 30 day postoperative MACE)  TOYIN indicates a .22% risk of intraoperative or 30 day postoperative MACE  No additional preoperative testing is currently indicated.    EKG - 11/18/2024 SR with 1st degree AVB, RSR or QR pattern in V1 suggests RV conduction delay, L ant fascicular block    Pulmonary:  No diagnosis or significant findings on chart review or clinical presentation and evaluation.     PRODIGY: High risk for opioid induced respiratory depression  Discussed smoking cessation and deep breathing handout given    Renal:   No diagnosis or significant findings on chart review, or clinical presentation and evaluation.     Pt at Low risk for perioperative OZZY based on Dynamic Predictive Scoring Tool for Perioperative OZZY  Lab Results   Component Value Date    GLUCOSE 92 01/14/2025    CALCIUM 8.6 01/14/2025     01/14/2025    K 4.7 01/14/2025    CO2 27 01/14/2025     01/14/2025    BUN 29 (H) 01/14/2025    CREATININE 1.08 01/14/2025       Endocrine:  No diagnosis or significant findings on chart review or clinical presentation and evaluation.     Lab Results   Component Value Date    HGBA1C 5.3 11/18/2024       Hematologic:  No diagnosis or significant findings on chart review or clinical presentation and evaluation.  Lab Results "   Component Value Date    WBC 5.50 01/14/2025    HGB 12.86 01/14/2025    HCT 38.6 01/14/2025    MCV 97.3 (H) 01/14/2025    .2 01/14/2025       CAPRINI SCORE 9    Pt supplied education/VTE handout    Gastrointestinal:   No diagnosis or significant findings on chart review or clinical presentation and evaluation.   EAT-10 score of 0 - self-perceived oropharyngeal dysphagia scale (0-40)      :  No diagnosis or significant findings on chart review or clinical presentation and evaluation.     Infectious disease:   No diagnosis or significant findings on chart review or clinical presentation and evaluation.   Prescription provided for CHG body wash and dental rinse. CHG use instructions reviewed and provided to patient.  Staph screen collected    Musculoskeletal:   See HPI  Last dose of NSAIDS  2/3/25  Instructed if  no issues with your liver you may take Tylenol 2-500 mg tablets every 8 hrs around the clock for pain including morning of surgery with a sip of water    Preoperative risk assessment  ASA II  NSQIP - Predicted length of stay days 0  PAT Testing - mrsa    Anesthesia:  No anesthesia complications    denies dental issues  Smoker-denies  Drugs-denies  ETOH-rare  denies personal/family issues with Anesthesia  No nickel, shell fish, or iodine allergies    CHLORHEXIDINE .12% DENTAL RINSE E PRESCIRBED PER  INFECTION PREVENTION PROTOCOL. PATIENT EDUCATED   Patient advised to call PAT if does not receive Mouthwash    Face to Face patient contact time 30 min    MAHI Nolasco 1/21/2025 3:03 PM

## 2025-01-22 ENCOUNTER — HOSPITAL ENCOUNTER (OUTPATIENT)
Dept: RADIOLOGY | Facility: HOSPITAL | Age: 80
Discharge: HOME | End: 2025-01-22
Payer: MEDICARE

## 2025-01-22 DIAGNOSIS — M17.12 ARTHRITIS OF LEFT KNEE: ICD-10-CM

## 2025-01-22 PROCEDURE — 73564 X-RAY EXAM KNEE 4 OR MORE: CPT | Mod: LT

## 2025-01-22 PROCEDURE — 73564 X-RAY EXAM KNEE 4 OR MORE: CPT | Performed by: RADIOLOGY

## 2025-01-22 PROCEDURE — 77073 BONE LENGTH STUDIES: CPT | Performed by: RADIOLOGY

## 2025-01-22 PROCEDURE — 77073 BONE LENGTH STUDIES: CPT

## 2025-01-23 LAB — STAPHYLOCOCCUS SPEC CULT: NORMAL

## 2025-01-29 DIAGNOSIS — I10 PRIMARY HYPERTENSION: ICD-10-CM

## 2025-01-29 RX ORDER — LOSARTAN POTASSIUM 50 MG/1
50 TABLET ORAL DAILY
Qty: 90 TABLET | Refills: 3 | Status: SHIPPED | OUTPATIENT
Start: 2025-01-29 | End: 2026-01-29

## 2025-01-31 ENCOUNTER — APPOINTMENT (OUTPATIENT)
Dept: ORTHOPEDIC SURGERY | Facility: CLINIC | Age: 80
End: 2025-01-31
Payer: MEDICARE

## 2025-01-31 DIAGNOSIS — M17.12 ARTHRITIS OF LEFT KNEE: Primary | ICD-10-CM

## 2025-01-31 PROCEDURE — 1123F ACP DISCUSS/DSCN MKR DOCD: CPT | Performed by: STUDENT IN AN ORGANIZED HEALTH CARE EDUCATION/TRAINING PROGRAM

## 2025-01-31 PROCEDURE — 99214 OFFICE O/P EST MOD 30 MIN: CPT | Performed by: STUDENT IN AN ORGANIZED HEALTH CARE EDUCATION/TRAINING PROGRAM

## 2025-01-31 NOTE — PROGRESS NOTES
Katie Ornelas MD   Adult Reconstruction and Joint Replacement Surgery  Phone: [phone]     Fax: [phone]       Name: Kun Castle Jr  Age: 79 y.o.   : 1945   Date of Visit: 2025    PREOPERATIVE CONSULTATION    CC: Left knee pain    Clinical History:  This patient presents for discussion of upcoming LEFT total KNEE arthroplasty.     The patient reports several years of LEFT knee pain.     He is looking forward to proceeding with all of Left knee replacement surgery.  He unfortunately had to reschedule his surgical date a couple times due to health issues with his wife.    The patient has tried at least 3 months of conservative treatments and continues to have disabling pain, impaired activities of daily living and worsened quality of life. They rate their pain and/or debilitation as severe at times.     Patient has tried the following Activity modification, NSAIDs, Tylenol (arthritis dosing) , Physical therapy, Ice, Brace , Corticosteroid injections , and Xray. Date of last steroid injection: More than 3 months ago. Patient does have pain at night. Patient does not report falls related to this problem. Patient is able to walk 2-3 blocks. Patient is currently using nothing as assistive device. Primarily complains of diffuse and medial pain. Patient has difficulty with climbing stairs, descending stairs, walking , getting up from a chair, prolonged sitting , and walking on unlevel surfaces . The pain is significantly impacting their ability to perform activities of daily living. Patient reports no longer able to do activities such as walk longer distances, walk without knee buckling.     Focused History  PMH: Reviewed and PE/DVT: none  PSH: Reviewed , Hip/Knee replacement: none, Hip/Knee surgery: none, Anesthesia complications: no, Spine surgery: no, Surgical infection: no, and Weight loss surgery: no  Meds: Reviewed, Current Anticoagulants: none, Weight loss medication: no, and Current  Opioids: no  Allergies: Reviewed  and The patient reports no contraindications or allergies to cephalosporins, aspirin, NSAIDs or opioids, except as noted above.  FH: No family history of any bleeding or clotting disorders.  SHx: Reviewed, Occupation: retired, Current smoker: no, EtOH intake weekly: minimal, Social support: wife, and Preferred physical activities: walking  Zoroastrian: no    PROMs/HISTORY  PROMs   Koos Jr-Knee Injury And Osteoarthritis Outcome Score For Joint Replacement    12/12/2024  4:53 PM EST - Filed by Patient   Instructions    The following question concerns the amount of joint stiffness you have experienced during the last week in your knee. Stiffness is a sensation of restriction or slowness in the ease with which you move your knee joint.   How severe is your knee stiffness after first wakening in the morning? Mild   What amount of knee pain have you experienced the last week during the following activities?   Twisting/pivoting on your knee None   Straightening knee fully None   Going up or down stairs None   Standing upright None   The following questions concern your physical function. By this we mean your ability to move around and to look after yourself. For each of the following activities please indicate the degree of difficulty you have experienced in the last week due to your knee.   Rising from sitting Mild   Bending to floor/ an object Mild   Koos Jr Scoring (range: 0 - 100) 79.91     [facility] 12 Item Health Survey (Vr-12)    2/28/2024  9:17 AM EST - Filed by Shanita Pope RN   In general, would you say your health is: Very Good   The following questions are about activities you might do during a typical day. Does your health now limit you in these activities?  If so, how much?   Moderate activities, such as moving a table, pushing a vacuum , bowling, or playing golf? Yes, Limited A Little   Climbing several flights of stairs? Yes, Limited A Little    During the past 4 weeks, have you had any of the following problems with your work or other regular daily activities as a result of your physical health?   Accomplished less than you would like. Yes, Some Of The Time   Were limited in the kind of work or other activities. Yes, Some Of The Time   During the past 4 weeks, have you had any of the following problems with your work or other regular daily activities as a result of any emotional problems (such as feeling depressed or anxious)?   Accomplished less than you would like No, None Of The Time   Didn't do work or other activities as carefully as usual Yes, A Little Of The Time   During the past 4 weeks, how much did pain interfere with your normal work (including both work outside the home and house work)? Moderately   How much of the time during the past 4 weeks:   Have you felt calm and peaceful? All Of The Time   Did you have a lot of energy? Some Of The Time   Have you felt downhearted and blue? A Little Of The Time   During the past 4 weeks, how much of the time has your physical health or emotional problems interfered with your social activities (like visiting with friends, relatives, etc.)? None Of The Time   Compared to one year ago, how would you rate your physical health in general now? About The Same   Compared to one year ago, how would you rate your emotional problems (such as feeling anxious, depressed or irritable) now? About The Same   Ort Vr-12 Question Pcs (range: 0 - 100) 33.65   Ort Vr-12 Question McS (range: 0 - 100) 60.78          Past Medical History:   Diagnosis Date    Arthritis     Asymptomatic varicose veins of bilateral lower extremities 07/27/2015    Varicose veins of legs    Cataract     Diverticulosis     GERD (gastroesophageal reflux disease)     Hiatal hernia     Hyperlipidemia     Hypertension     Joint pain     Nephrolithiasis     Nocturia 07/25/2016    Nocturia    Orthostatic hypotension 12/02/2015    Sympathotonic  orthostatic hypotension    Pain in right knee 08/28/2015    Recurrent knee pain, right    Personal history of diseases of the skin and subcutaneous tissue 07/25/2015    History of rhinophyma    Personal history of other diseases of the digestive system     History of gastroesophageal reflux (GERD)    Personal history of other diseases of the nervous system and sense organs 10/29/2015    History of sciatica    Rheumatoid arthritis     Sciatica, unspecified side 12/02/2015    Acute sciatica    Vitamin D deficiency, unspecified 04/05/2016    Vitamin D deficiency       Past Medical History:   Diagnosis Date    Arthritis     Asymptomatic varicose veins of bilateral lower extremities 07/27/2015    Varicose veins of legs    Cataract     Diverticulosis     GERD (gastroesophageal reflux disease)     Hiatal hernia     Hyperlipidemia     Hypertension     Joint pain     Nephrolithiasis     Nocturia 07/25/2016    Nocturia    Orthostatic hypotension 12/02/2015    Sympathotonic orthostatic hypotension    Pain in right knee 08/28/2015    Recurrent knee pain, right    Personal history of diseases of the skin and subcutaneous tissue 07/25/2015    History of rhinophyma    Personal history of other diseases of the digestive system     History of gastroesophageal reflux (GERD)    Personal history of other diseases of the nervous system and sense organs 10/29/2015    History of sciatica    Rheumatoid arthritis     Sciatica, unspecified side 12/02/2015    Acute sciatica    Vitamin D deficiency, unspecified 04/05/2016    Vitamin D deficiency     Documented in chart and reviewed.     Past Surgical History:   Procedure Laterality Date    ADENOIDECTOMY      APPENDECTOMY      CATARACT EXTRACTION      CATARACT EXTRACTION, BILATERAL      CHOLECYSTECTOMY      COLONOSCOPY  05/14/2014    Colonoscopy (Fiberoptic)    HERNIA REPAIR      JOINT REPLACEMENT      KNEE ARTHROPLASTY      SINUS SURGERY      TENDON REPAIR Left     left hand    TONSILLECTOMY          Allergies: He has No Known Allergies.     Medications:  Current Outpatient Medications   Medication Instructions    b complex (B Complex 1, with folic acid,) 0.4 mg tablet 1 tablet, Daily    chlorhexidine (Hibiclens) 4 % external liquid Topical, Daily PRN    cholecalciferol (Vitamin D-3) 50 MCG (2000 UT) tablet 1 tablet, Daily    losartan (COZAAR) 50 mg, oral, Daily, EVENING    multivitamin tablet 1 tablet, Daily    pantoprazole (PROTONIX) 40 mg, oral, Daily before breakfast, Do not crush, chew, or split.       Family History   Problem Relation Name Age of Onset    Diabetes Father      Cancer Brother       Documented in chart and reviewed.     Social History     Tobacco Use    Smoking status: Never     Passive exposure: Never    Smokeless tobacco: Never   Substance Use Topics    Alcohol use: Yes     Comment: RARE        Review of Systems: Review of systems completed with medical assistant intake. Please refer to this note.     Physical Exam:  BMI: 28.    General: The patient is well appearing and has an appropriate affect.     Neurological Examination: SILT in SPN/DPN/Sural/Saphenous/Tibial nerves. 5/5 EHL, FHL, Tibial anterior, Gastrocnemius. Coordination grossly intact.     Cardiovascular Exam: Capillary refill <2 seconds.     Lymphatic Examination: There is no obvious lymphatic swelling present around the involved joint.    Skin Exam: Skin around the pertinent joint is without evidence of infection or rash.    Gait: The patient ambulates with an antalgic gait.     Lumbar spine:    No tenderness to palpation midline.    Negative straight leg raise bilaterally.    Right Hip Examination:  The skin is intact over the hip.    There is no tenderness over the greater trochanter.    Range of motion is full extension to 100 degrees of flexion.    The hip is stable without subluxation or dislocation.    The hip internally rotates to 15 degrees and externally rotates to 45 degrees.    There is no pain with hip  motion.    Left Hip Examination:  The skin is intact over the hip.    There is no tenderness over the greater trochanter.    Range of motion is full extension to 100 degrees of flexion.    The hip is stable without subluxation or dislocation.    The hip internally rotates to 15 degrees and externally rotates to 45 degrees.    There is no pain with hip motion.    Left Knee Examination:  Examination of the knee reveals the skin to be intact.    There is a moderate effusion in the knee.    The alignment of the knee is Varus.    This deformity is not correctable.    There is tenderness to palpation over the joint line.    There is significant quadriceps atrophy.    Range of Motion: 5 to 110 degrees of flexion.    The knee is stable to varus-valgus stress and anterior-posterior stress.     There is moderate grinding with range of motion.    There is mild patellofemoral crepitus.    Right Knee Examination:  Examination of the knee reveals the skin to be intact.  Healed anterior incision from previous knee replacement.    There is no obvious swelling.    There is a no effusion in the knee.     The alignment of the knee is normal.    There is no tenderness to palpation over the joint line.    There is no significant quadriceps atrophy.    Range of motion is full extension to 130 degrees of flexion.    The knee is stable to varus-valgus stress and anterior-posterior stress.     There is no grinding with range of motion.    There is no patellofemoral crepitus.    Radiographs:  Radiographs were personally reviewed today. There is evidence of severe LEFT knee osteoarthritis with MEDIAL bone on bone apposition.    Impression:  This patient presents with severe LEFT knee osteoarthritis with bone on bone apposition. Patient has tried and failed appropriate conservative measures and now has limitation in ADL's.     Diagnosis:  Arthritis of left knee     Recommendations / Plan:    I have discussed the options in detail with the  patient. We have discussed anti-inflammatory medication, activity modification, physical therapy, corticosteroid injections, viscosupplementation injections, partial knee replacement surgery and total knee replacement surgery.    The risks and benefits of all these treatment options have been discussed in detail. The patient has tried at least 3 months of the above conservative treatments and continues to have disabling pain, impaired activities of daily living and worsened quality of life. The patient is a candidate for a LEFT TOTAL knee replacement.     We discussed the hospital stay, postoperative rehab and follow up required as well as the potential risks of surgery including bleeding, need for homologous blood transfusion, infection, need for reoperation, failure of the operation to provide symptomatic relief, need for multiple revision surgeries in the setting of bleeding, wear, infection, instability, stiffness (meaning loss of flexion and/or loss of extension) requiring closed and/or open manipulation and/or revision, damage to major neurovascular structures, venous thrombolic disease, complications of regional and/or general anesthesia, as well as death. The patient also understands that a good result is not guaranteed and that poor outcomes can at times be unavoidable. The patient may also have persistent and chronic pain that could require further intervention.  The patient confirms their understanding of the risks as well as the benefits of surgery. I have explained that although knee replacement generally provides excellent pain relief and improvement in function, some patients continue to have a feeling of stiffness in the knee that can be permanent. We discussed the importance of physical therapy postoperatively to regain knee range of motion. Postoperative pain management strategies were reviewed. We discussed that most patients discharge home in 0-1 days after their surgery depending on their  medical health, physical function and social support.      We have reviewed the typical recovery after surgery and have discussed the fact that full recovery can take up to 1 year or even longer.     The patient does not have any of the following contraindications to arthroplasty including active infection of the knee joint, systemic bacteremia, active skin infection or an open wound at the surgical site, neuropathic arthritis or severe, rapidly progressive neurological disease.     Patient will consider proceeding with LEFT TOTAL knee replacement. All questions were answered today. If the patient chooses to move forward with surgical scheduling, they will be enrolled in a preoperative arthroplasty teaching class and the pre-admission testing pathway. The patient was encouraged to contact their primary care physician to discuss fitness for surgery. The patient has sought medical clearance from: Primary Care Physician: 1/14/2025. Pre-admission testing appointment date: 1/21/2025.    Social support after surgery: Spouse   Pain medication plan: Standard (Acetominophen, Meloxicam, Oxycodone, Tramadol)   Additional preoperative considerations: None    Diagnosis: M17.12 - LEFT knee osteoarthritis    Procedure: 88946 - Total KNEE Arthroplasty (TKA)   Surgery Location: [facility]   Equipment Requests: TKA: Tiemann thin bent Hohmann retractors, 2 Langenbecks and DEPUY: Attune CR with MS (PS on-hand)   Anesthesia: TKA: Regional - Spinal, Adductor canal block, iPACK block   Discharge: Same-day (RAPID)    _____________  Katie Ornelas MD   Attending Orthopaedic Surgeon  [facility]    [facility]       This office note was transcribed with dictation software.  Please excuse any typographical errors, program misunderstandings leading to inadvertent insertions or deletions of inappropriate wording, pronoun errors and other unintentional transcription errors not noticed on  proof-reading.

## 2025-02-03 RX ORDER — PANTOPRAZOLE SODIUM 40 MG/1
40 TABLET, DELAYED RELEASE ORAL
Qty: 30 TABLET | Refills: 0 | Status: SHIPPED | OUTPATIENT
Start: 2025-02-03 | End: 2025-03-13

## 2025-02-03 RX ORDER — ACETAMINOPHEN 500 MG
1000 TABLET ORAL EVERY 8 HOURS
Qty: 360 TABLET | Refills: 0 | Status: SHIPPED | OUTPATIENT
Start: 2025-02-03 | End: 2025-04-04

## 2025-02-03 RX ORDER — DOCUSATE SODIUM 100 MG/1
100 CAPSULE, LIQUID FILLED ORAL 2 TIMES DAILY
Qty: 30 CAPSULE | Refills: 0 | Status: SHIPPED | OUTPATIENT
Start: 2025-02-03 | End: 2025-02-26

## 2025-02-03 RX ORDER — CEFADROXIL 500 MG/1
500 CAPSULE ORAL 2 TIMES DAILY
Qty: 14 CAPSULE | Refills: 0 | Status: SHIPPED | OUTPATIENT
Start: 2025-02-03 | End: 2025-02-18

## 2025-02-03 RX ORDER — SENNOSIDES 8.6 MG/1
1 TABLET ORAL DAILY
Qty: 15 TABLET | Refills: 0 | Status: SHIPPED | OUTPATIENT
Start: 2025-02-03 | End: 2025-02-26

## 2025-02-03 RX ORDER — NAPROXEN SODIUM 220 MG/1
81 TABLET, FILM COATED ORAL 2 TIMES DAILY
Qty: 84 TABLET | Refills: 0 | Status: SHIPPED | OUTPATIENT
Start: 2025-02-03 | End: 2025-03-25

## 2025-02-03 RX ORDER — TRAMADOL HYDROCHLORIDE 50 MG/1
50-100 TABLET ORAL EVERY 6 HOURS PRN
Qty: 40 TABLET | Refills: 0 | Status: SHIPPED | OUTPATIENT
Start: 2025-02-03 | End: 2025-02-18

## 2025-02-03 RX ORDER — OXYCODONE HYDROCHLORIDE 5 MG/1
5-10 TABLET ORAL EVERY 6 HOURS PRN
Qty: 40 TABLET | Refills: 0 | Status: SHIPPED | OUTPATIENT
Start: 2025-02-03 | End: 2025-02-16

## 2025-02-03 RX ORDER — MELOXICAM 15 MG/1
15 TABLET ORAL DAILY
Qty: 60 TABLET | Refills: 0 | Status: SHIPPED | OUTPATIENT
Start: 2025-02-03 | End: 2025-04-12

## 2025-02-03 RX ORDER — CEFAZOLIN 1 G/1
500 INJECTION, POWDER, FOR SOLUTION INTRAVENOUS ONCE
Status: CANCELLED | OUTPATIENT
Start: 2025-02-03 | End: 2025-02-03

## 2025-02-03 RX ORDER — BUPIVACAINE HCL/EPINEPHRINE 0.5-1:200K
30 VIAL (ML) INJECTION ONCE
Status: CANCELLED | OUTPATIENT
Start: 2025-02-03 | End: 2025-02-03

## 2025-02-03 RX ORDER — SODIUM CHLORIDE 9 MG/ML
22 INJECTION INTRAMUSCULAR; INTRAVENOUS; SUBCUTANEOUS ONCE
Status: CANCELLED | OUTPATIENT
Start: 2025-02-03 | End: 2025-02-03

## 2025-02-03 RX ORDER — ONDANSETRON 4 MG/1
4 TABLET, FILM COATED ORAL EVERY 8 HOURS PRN
Qty: 20 TABLET | Refills: 0 | Status: SHIPPED | OUTPATIENT
Start: 2025-02-03

## 2025-02-03 NOTE — DISCHARGE INSTRUCTIONS
Total/Partial Knee Arthroplasty   Postoperative Instructions    CONTACT INFORMATION  Katie Ornelas MD  Joint Replacement Surgeon   Kianna Ng      [phone]     Violette Sosa MBA, BSN, RN-BC  Ortho Coordinator [place]  [phone]    Reid Underwood RN, BSN  Ortho Coordinator [facility]  [phone]    Brenna Owen BSN-BC  Ortho Nurse Navigator  [phone]    DRIVING AFTER SURGERY   Please discuss post-surgical, long-distance travel with your surgeon. You may not drive until you are off narcotics and cleared by your surgical team.     WOUND CARE   A padded wrap (ace wrap) was placed on your knee on the day of surgery. You may remove this on the 2nd day after surgery.     A waterproof dressing was placed over your surgical site. You may shower over this dressing after surgery and pat it dry. Please do not scrub, soak, or submerge your surgical site for at least three weeks after surgery to allow your incision to heal. Avoid using creams and ointments around your surgical site while it is healing.    Your dressing will be removed on post-operative day 7 by your physical therapist. You may leave the incision open to air after the bandage has been removed. You may have a second dressing/incision on your leg from pin sites if robotic-assistance was used in your surgery. Please do not submerge your incision in a hot tub/pool/lake/etc. until cleared by your surgeon. Please contact the office if there are any concerns with your wound.     Pain, swelling, and bruising are normal after total knee replacement surgery. You may alleviate these symptoms by following the post-operative pain regimen that was prescribed, icing your surgical site multiple times a day, and elevating your extremity throughout the day.     ACTIVITY AND PRECAUTIONS  Please follow the post-operative activity precautions that were described to you by your surgical team and physical therapists (if  applicable).    Weightbearing restriction: ***weightbearing as tolerated.     DISCHARGE MEDICATIONS  Pain  Please take the pain medications that were prescribed to you according to the prescribed schedule. You may not operate a motor vehicle while taking narcotic medications (e.g. Oxycodone, Tramadol).     Please take Tylenol and NSAIDs (if you are able) on a schedule as discussed in clinic. Please take the prescribed Pantoprazole to protect your stomach from ulceration after surgery while taking NSAIDs.     Please wean off the narcotic pain medications as soon as you are able.     Blood-Thinners  Please take the blood thinning medications that were prescribed according to the instructions from your surgeon. These are typically continued for 6 weeks postoperatively. This schedule may differ if you were already on blood-thinning medication prior to your surgery. ***    Nausea  You may feel nauseous after surgery due to the anesthetics or pain medications you are taking. You may take anti-nausea medication (e.g. Ondansetron) to help with these symptoms.     Stool Softeners   Please take the stool softeners that were prescribed at discharge (e.g. Colace, Senna) while you are on narcotic pain medications to minimize your risk of constipation.    Home Medications   You may restart your home medications at time of discharge according to your discharge instructions.    PHYSICAL THERAPY AND OCCUPATIONAL THERAPY   In-home physical therapy and occupational therapy will start within a few days of your discharge to home. You will transition to outpatient physical therapy around 2-3 weeks after your surgery.     FOLLOW-UP  You will see your surgical team around 2 weeks after surgery for a wound check (unless otherwise arranged) and between 4-6 weeks after surgery for X-rays and clinical evaluation.    CALL YOUR SURGEON IF YOU HAVE:   ° Drainage that is not contained by your surgical dressing.  ° Redness, pain or swelling in your  calf.   ° Severe pain that is not controlled by the pain regimen prescribed.   ° Fever >101 Fahrenheit presents for at least 48 hours or other signs of infection (wound drainage, redness around your surgical incision, increased joint pain)  ° Go to the emergency department or call 911 if you experience chest pain, shortness of breath or other emergent symptoms. Do not call your surgeon first.     ANTIBIOTIC PROPHYLAXIS AFTER JOINT REPLACEMENT SURGERY   Although it is a rare occurrence, an artificial joint can become infected by the bloodstream carrying infection from another part of the body to the replaced joint.  Therefore, it is important that any bacterial infection be treated promptly. If you are unsure of whether you need to take antibiotics, please call the office before taking any medication.  We advise that you take antibiotics prior to the following invasive procedures for a lifetime. Please wait at least 3-6 months after joint replacement surgery before undergoing dental work or cleaning.    Please take antibiotics one hour before any of the following procedures:  ° Routine dental cleaning or dental procedure  ° Root canal  ° Podiatry procedures that involve cutting into the skin  ° Dermatologic procedures that involve cutting into the skin.  (Not required for laser or freezing of skin)  ° Invasive procedures regarding the urinary tract     Antibiotics are not required for the following procedures:   ° Manicures/Pedicures  ° Colonoscopy/Endoscopy/Sigmoidoscopy  ° Routine gynecologic exams/procedures/PAP smears, D&C´s  ° Cataract/laser eye surgery  ° Injection or blood work  ° Routine colds and flu and minor cuts and bruises    You may have a flu shot at any time following your surgery.

## 2025-02-09 ENCOUNTER — HOME HEALTH ADMISSION (OUTPATIENT)
Dept: HOME HEALTH SERVICES | Facility: HOME HEALTH | Age: 80
End: 2025-02-09
Payer: MEDICARE

## 2025-02-11 ENCOUNTER — ANESTHESIA (OUTPATIENT)
Dept: OPERATING ROOM | Facility: HOSPITAL | Age: 80
End: 2025-02-11
Payer: MEDICARE

## 2025-02-11 ENCOUNTER — HOSPITAL ENCOUNTER (OUTPATIENT)
Facility: HOSPITAL | Age: 80
Setting detail: OUTPATIENT SURGERY
Discharge: HOME HEALTH | End: 2025-02-11
Attending: STUDENT IN AN ORGANIZED HEALTH CARE EDUCATION/TRAINING PROGRAM | Admitting: STUDENT IN AN ORGANIZED HEALTH CARE EDUCATION/TRAINING PROGRAM
Payer: MEDICARE

## 2025-02-11 ENCOUNTER — DOCUMENTATION (OUTPATIENT)
Dept: HOME HEALTH SERVICES | Facility: HOME HEALTH | Age: 80
End: 2025-02-11

## 2025-02-11 ENCOUNTER — PHARMACY VISIT (OUTPATIENT)
Dept: PHARMACY | Facility: CLINIC | Age: 80
End: 2025-02-11
Payer: COMMERCIAL

## 2025-02-11 ENCOUNTER — APPOINTMENT (OUTPATIENT)
Dept: RADIOLOGY | Facility: HOSPITAL | Age: 80
End: 2025-02-11
Payer: MEDICARE

## 2025-02-11 ENCOUNTER — ANESTHESIA EVENT (OUTPATIENT)
Dept: OPERATING ROOM | Facility: HOSPITAL | Age: 80
End: 2025-02-11
Payer: MEDICARE

## 2025-02-11 VITALS
BODY MASS INDEX: 27.24 KG/M2 | DIASTOLIC BLOOD PRESSURE: 89 MMHG | RESPIRATION RATE: 16 BRPM | SYSTOLIC BLOOD PRESSURE: 176 MMHG | HEART RATE: 92 BPM | OXYGEN SATURATION: 96 % | WEIGHT: 189.82 LBS | TEMPERATURE: 97.3 F

## 2025-02-11 DIAGNOSIS — M17.12 UNILATERAL PRIMARY OSTEOARTHRITIS, LEFT KNEE: Primary | ICD-10-CM

## 2025-02-11 PROBLEM — K21.9 GASTROESOPHAGEAL REFLUX DISEASE: Status: ACTIVE | Noted: 2025-02-11

## 2025-02-11 PROBLEM — K44.9 HIATAL HERNIA: Status: ACTIVE | Noted: 2025-02-11

## 2025-02-11 PROBLEM — J45.909 ASTHMA DUE TO SEASONAL ALLERGIES (HHS-HCC): Status: RESOLVED | Noted: 2023-11-04 | Resolved: 2025-02-11

## 2025-02-11 PROCEDURE — 2500000005 HC RX 250 GENERAL PHARMACY W/O HCPCS

## 2025-02-11 PROCEDURE — RXMED WILLOW AMBULATORY MEDICATION CHARGE

## 2025-02-11 PROCEDURE — 2500000004 HC RX 250 GENERAL PHARMACY W/ HCPCS (ALT 636 FOR OP/ED): Performed by: STUDENT IN AN ORGANIZED HEALTH CARE EDUCATION/TRAINING PROGRAM

## 2025-02-11 PROCEDURE — 27447 TOTAL KNEE ARTHROPLASTY: CPT | Performed by: STUDENT IN AN ORGANIZED HEALTH CARE EDUCATION/TRAINING PROGRAM

## 2025-02-11 PROCEDURE — 3700000001 HC GENERAL ANESTHESIA TIME - INITIAL BASE CHARGE: Performed by: STUDENT IN AN ORGANIZED HEALTH CARE EDUCATION/TRAINING PROGRAM

## 2025-02-11 PROCEDURE — 2780000003 HC OR 278 NO HCPCS: Performed by: STUDENT IN AN ORGANIZED HEALTH CARE EDUCATION/TRAINING PROGRAM

## 2025-02-11 PROCEDURE — 73560 X-RAY EXAM OF KNEE 1 OR 2: CPT | Mod: LEFT SIDE | Performed by: RADIOLOGY

## 2025-02-11 PROCEDURE — 7100000010 HC PHASE TWO TIME - EACH INCREMENTAL 1 MINUTE: Performed by: STUDENT IN AN ORGANIZED HEALTH CARE EDUCATION/TRAINING PROGRAM

## 2025-02-11 PROCEDURE — 73560 X-RAY EXAM OF KNEE 1 OR 2: CPT | Mod: LT

## 2025-02-11 PROCEDURE — A27447 PR TOTAL KNEE ARTHROPLASTY: Performed by: ANESTHESIOLOGY

## 2025-02-11 PROCEDURE — 97161 PT EVAL LOW COMPLEX 20 MIN: CPT | Mod: GP

## 2025-02-11 PROCEDURE — 2500000004 HC RX 250 GENERAL PHARMACY W/ HCPCS (ALT 636 FOR OP/ED)

## 2025-02-11 PROCEDURE — 2500000001 HC RX 250 WO HCPCS SELF ADMINISTERED DRUGS (ALT 637 FOR MEDICARE OP): Performed by: STUDENT IN AN ORGANIZED HEALTH CARE EDUCATION/TRAINING PROGRAM

## 2025-02-11 PROCEDURE — A27447 PR TOTAL KNEE ARTHROPLASTY: Performed by: INTERNAL MEDICINE

## 2025-02-11 PROCEDURE — 97530 THERAPEUTIC ACTIVITIES: CPT | Mod: GP

## 2025-02-11 PROCEDURE — 97110 THERAPEUTIC EXERCISES: CPT | Mod: GP

## 2025-02-11 PROCEDURE — 7100000009 HC PHASE TWO TIME - INITIAL BASE CHARGE: Performed by: STUDENT IN AN ORGANIZED HEALTH CARE EDUCATION/TRAINING PROGRAM

## 2025-02-11 PROCEDURE — 3700000002 HC GENERAL ANESTHESIA TIME - EACH INCREMENTAL 1 MINUTE: Performed by: STUDENT IN AN ORGANIZED HEALTH CARE EDUCATION/TRAINING PROGRAM

## 2025-02-11 PROCEDURE — 2500000005 HC RX 250 GENERAL PHARMACY W/O HCPCS: Performed by: STUDENT IN AN ORGANIZED HEALTH CARE EDUCATION/TRAINING PROGRAM

## 2025-02-11 PROCEDURE — 3600000010 HC OR TIME - EACH INCREMENTAL 1 MINUTE - PROCEDURE LEVEL FIVE: Performed by: STUDENT IN AN ORGANIZED HEALTH CARE EDUCATION/TRAINING PROGRAM

## 2025-02-11 PROCEDURE — C1776 JOINT DEVICE (IMPLANTABLE): HCPCS | Performed by: STUDENT IN AN ORGANIZED HEALTH CARE EDUCATION/TRAINING PROGRAM

## 2025-02-11 PROCEDURE — 64447 NJX AA&/STRD FEMORAL NRV IMG: CPT | Performed by: STUDENT IN AN ORGANIZED HEALTH CARE EDUCATION/TRAINING PROGRAM

## 2025-02-11 PROCEDURE — 97116 GAIT TRAINING THERAPY: CPT | Mod: GP

## 2025-02-11 PROCEDURE — 3600000005 HC OR TIME - INITIAL BASE CHARGE - PROCEDURE LEVEL FIVE: Performed by: STUDENT IN AN ORGANIZED HEALTH CARE EDUCATION/TRAINING PROGRAM

## 2025-02-11 PROCEDURE — 7100000002 HC RECOVERY ROOM TIME - EACH INCREMENTAL 1 MINUTE: Performed by: STUDENT IN AN ORGANIZED HEALTH CARE EDUCATION/TRAINING PROGRAM

## 2025-02-11 PROCEDURE — 2500000004 HC RX 250 GENERAL PHARMACY W/ HCPCS (ALT 636 FOR OP/ED): Mod: JZ | Performed by: STUDENT IN AN ORGANIZED HEALTH CARE EDUCATION/TRAINING PROGRAM

## 2025-02-11 PROCEDURE — 2720000007 HC OR 272 NO HCPCS: Performed by: STUDENT IN AN ORGANIZED HEALTH CARE EDUCATION/TRAINING PROGRAM

## 2025-02-11 PROCEDURE — 99100 ANES PT EXTEME AGE<1 YR&>70: CPT | Performed by: ANESTHESIOLOGY

## 2025-02-11 PROCEDURE — 7100000001 HC RECOVERY ROOM TIME - INITIAL BASE CHARGE: Performed by: STUDENT IN AN ORGANIZED HEALTH CARE EDUCATION/TRAINING PROGRAM

## 2025-02-11 PROCEDURE — 94760 N-INVAS EAR/PLS OXIMETRY 1: CPT | Mod: 59

## 2025-02-11 PROCEDURE — C1713 ANCHOR/SCREW BN/BN,TIS/BN: HCPCS | Performed by: STUDENT IN AN ORGANIZED HEALTH CARE EDUCATION/TRAINING PROGRAM

## 2025-02-11 DEVICE — ATTUNE PATELLA MEDIALIZED DOME 38MM CEMENTED AOX
Type: IMPLANTABLE DEVICE | Site: PATELLA | Status: FUNCTIONAL
Brand: ATTUNE

## 2025-02-11 DEVICE — TOBRA FULL DOSE ANTIBIOTIC BONE CEMENT, 10 PACK CATALOG NUMBER IS 6197-9-010
Type: IMPLANTABLE DEVICE | Site: KNEE | Status: FUNCTIONAL
Brand: SIMPLEX

## 2025-02-11 DEVICE — ATTUNE KNEE SYSTEM FEMORAL CRUCIATE RETAINING SIZE 6 LEFT CEMENTED
Type: IMPLANTABLE DEVICE | Site: FEMUR | Status: FUNCTIONAL
Brand: ATTUNE

## 2025-02-11 RX ORDER — KETOROLAC TROMETHAMINE 30 MG/ML
INJECTION, SOLUTION INTRAMUSCULAR; INTRAVENOUS AS NEEDED
Status: DISCONTINUED | OUTPATIENT
Start: 2025-02-11 | End: 2025-02-11

## 2025-02-11 RX ORDER — FENTANYL CITRATE 50 UG/ML
25 INJECTION, SOLUTION INTRAMUSCULAR; INTRAVENOUS EVERY 5 MIN PRN
Status: DISCONTINUED | OUTPATIENT
Start: 2025-02-11 | End: 2025-02-11 | Stop reason: HOSPADM

## 2025-02-11 RX ORDER — ACETAMINOPHEN 325 MG/1
650 TABLET ORAL EVERY 6 HOURS SCHEDULED
Status: DISCONTINUED | OUTPATIENT
Start: 2025-02-11 | End: 2025-02-11 | Stop reason: HOSPADM

## 2025-02-11 RX ORDER — PROPOFOL 10 MG/ML
INJECTION, EMULSION INTRAVENOUS CONTINUOUS PRN
Status: DISCONTINUED | OUTPATIENT
Start: 2025-02-11 | End: 2025-02-11

## 2025-02-11 RX ORDER — SCOPOLAMINE 1 MG/3D
1 PATCH, EXTENDED RELEASE TRANSDERMAL ONCE
Status: DISCONTINUED | OUTPATIENT
Start: 2025-02-11 | End: 2025-02-11 | Stop reason: HOSPADM

## 2025-02-11 RX ORDER — CEFAZOLIN 1 G/1
INJECTION, POWDER, FOR SOLUTION INTRAVENOUS AS NEEDED
Status: DISCONTINUED | OUTPATIENT
Start: 2025-02-11 | End: 2025-02-11 | Stop reason: HOSPADM

## 2025-02-11 RX ORDER — PHENYLEPHRINE HCL IN 0.9% NACL 1 MG/10 ML
SYRINGE (ML) INTRAVENOUS AS NEEDED
Status: DISCONTINUED | OUTPATIENT
Start: 2025-02-11 | End: 2025-02-11

## 2025-02-11 RX ORDER — SODIUM CHLORIDE, SODIUM LACTATE, POTASSIUM CHLORIDE, CALCIUM CHLORIDE 600; 310; 30; 20 MG/100ML; MG/100ML; MG/100ML; MG/100ML
100 INJECTION, SOLUTION INTRAVENOUS CONTINUOUS
Status: ACTIVE | OUTPATIENT
Start: 2025-02-11 | End: 2025-02-11

## 2025-02-11 RX ORDER — ONDANSETRON 4 MG/1
4 TABLET, ORALLY DISINTEGRATING ORAL EVERY 8 HOURS PRN
Status: DISCONTINUED | OUTPATIENT
Start: 2025-02-11 | End: 2025-02-11 | Stop reason: HOSPADM

## 2025-02-11 RX ORDER — SODIUM CHLORIDE, SODIUM LACTATE, POTASSIUM CHLORIDE, CALCIUM CHLORIDE 600; 310; 30; 20 MG/100ML; MG/100ML; MG/100ML; MG/100ML
50 INJECTION, SOLUTION INTRAVENOUS CONTINUOUS
Status: DISCONTINUED | OUTPATIENT
Start: 2025-02-11 | End: 2025-02-11 | Stop reason: HOSPADM

## 2025-02-11 RX ORDER — ASPIRIN 81 MG/1
81 TABLET ORAL 2 TIMES DAILY
Status: DISCONTINUED | OUTPATIENT
Start: 2025-02-11 | End: 2025-02-11 | Stop reason: HOSPADM

## 2025-02-11 RX ORDER — CEFAZOLIN SODIUM 2 G/100ML
2 INJECTION, SOLUTION INTRAVENOUS ONCE
Status: COMPLETED | OUTPATIENT
Start: 2025-02-11 | End: 2025-02-11

## 2025-02-11 RX ORDER — LABETALOL HYDROCHLORIDE 5 MG/ML
5 INJECTION, SOLUTION INTRAVENOUS ONCE AS NEEDED
Status: DISCONTINUED | OUTPATIENT
Start: 2025-02-11 | End: 2025-02-11 | Stop reason: HOSPADM

## 2025-02-11 RX ORDER — ONDANSETRON HYDROCHLORIDE 2 MG/ML
4 INJECTION, SOLUTION INTRAVENOUS EVERY 8 HOURS PRN
Status: DISCONTINUED | OUTPATIENT
Start: 2025-02-11 | End: 2025-02-11 | Stop reason: HOSPADM

## 2025-02-11 RX ORDER — FENTANYL CITRATE 50 UG/ML
50 INJECTION, SOLUTION INTRAMUSCULAR; INTRAVENOUS EVERY 5 MIN PRN
Status: DISCONTINUED | OUTPATIENT
Start: 2025-02-11 | End: 2025-02-11 | Stop reason: HOSPADM

## 2025-02-11 RX ORDER — POLYETHYLENE GLYCOL 3350 17 G/17G
17 POWDER, FOR SOLUTION ORAL DAILY
Status: DISCONTINUED | OUTPATIENT
Start: 2025-02-11 | End: 2025-02-11 | Stop reason: HOSPADM

## 2025-02-11 RX ORDER — IPRATROPIUM BROMIDE 0.5 MG/2.5ML
500 SOLUTION RESPIRATORY (INHALATION) ONCE
Status: DISCONTINUED | OUTPATIENT
Start: 2025-02-11 | End: 2025-02-11 | Stop reason: HOSPADM

## 2025-02-11 RX ORDER — ALBUTEROL SULFATE 0.83 MG/ML
2.5 SOLUTION RESPIRATORY (INHALATION) ONCE AS NEEDED
Status: DISCONTINUED | OUTPATIENT
Start: 2025-02-11 | End: 2025-02-11 | Stop reason: HOSPADM

## 2025-02-11 RX ORDER — PANTOPRAZOLE SODIUM 40 MG/1
40 TABLET, DELAYED RELEASE ORAL
Status: DISCONTINUED | OUTPATIENT
Start: 2025-02-12 | End: 2025-02-11 | Stop reason: HOSPADM

## 2025-02-11 RX ORDER — IBUPROFEN 600 MG/1
600 TABLET ORAL 3 TIMES DAILY
Status: DISCONTINUED | OUTPATIENT
Start: 2025-02-12 | End: 2025-02-11 | Stop reason: HOSPADM

## 2025-02-11 RX ORDER — BISACODYL 5 MG
10 TABLET, DELAYED RELEASE (ENTERIC COATED) ORAL DAILY PRN
Status: DISCONTINUED | OUTPATIENT
Start: 2025-02-11 | End: 2025-02-11 | Stop reason: HOSPADM

## 2025-02-11 RX ORDER — FENTANYL CITRATE 50 UG/ML
50 INJECTION, SOLUTION INTRAMUSCULAR; INTRAVENOUS ONCE
Status: COMPLETED | OUTPATIENT
Start: 2025-02-11 | End: 2025-02-11

## 2025-02-11 RX ORDER — CEFAZOLIN SODIUM 2 G/100ML
2 INJECTION, SOLUTION INTRAVENOUS EVERY 8 HOURS
Status: DISCONTINUED | OUTPATIENT
Start: 2025-02-11 | End: 2025-02-11 | Stop reason: HOSPADM

## 2025-02-11 RX ORDER — TRANEXAMIC ACID 100 MG/ML
INJECTION, SOLUTION INTRAVENOUS AS NEEDED
Status: DISCONTINUED | OUTPATIENT
Start: 2025-02-11 | End: 2025-02-11

## 2025-02-11 RX ORDER — MIDAZOLAM HYDROCHLORIDE 1 MG/ML
2 INJECTION, SOLUTION INTRAMUSCULAR; INTRAVENOUS ONCE
Status: COMPLETED | OUTPATIENT
Start: 2025-02-11 | End: 2025-02-11

## 2025-02-11 RX ORDER — ONDANSETRON HYDROCHLORIDE 2 MG/ML
INJECTION, SOLUTION INTRAVENOUS AS NEEDED
Status: DISCONTINUED | OUTPATIENT
Start: 2025-02-11 | End: 2025-02-11

## 2025-02-11 RX ORDER — HYDRALAZINE HYDROCHLORIDE 20 MG/ML
5 INJECTION INTRAMUSCULAR; INTRAVENOUS EVERY 30 MIN PRN
Status: DISCONTINUED | OUTPATIENT
Start: 2025-02-11 | End: 2025-02-11 | Stop reason: HOSPADM

## 2025-02-11 RX ORDER — BUPIVACAINE HYDROCHLORIDE 7.5 MG/ML
INJECTION, SOLUTION INTRASPINAL AS NEEDED
Status: DISCONTINUED | OUTPATIENT
Start: 2025-02-11 | End: 2025-02-11

## 2025-02-11 RX ORDER — KETOROLAC TROMETHAMINE 15 MG/ML
15 INJECTION, SOLUTION INTRAMUSCULAR; INTRAVENOUS EVERY 6 HOURS
Status: DISCONTINUED | OUTPATIENT
Start: 2025-02-11 | End: 2025-02-11 | Stop reason: HOSPADM

## 2025-02-11 RX ADMIN — TRANEXAMIC ACID 1000 MG: 100 INJECTION INTRAVENOUS at 08:58

## 2025-02-11 RX ADMIN — SODIUM CHLORIDE, POTASSIUM CHLORIDE, SODIUM LACTATE AND CALCIUM CHLORIDE: 600; 310; 30; 20 INJECTION, SOLUTION INTRAVENOUS at 07:12

## 2025-02-11 RX ADMIN — POVIDONE-IODINE 1 APPLICATION: 5 SOLUTION TOPICAL at 05:58

## 2025-02-11 RX ADMIN — Medication 50 MCG: at 09:16

## 2025-02-11 RX ADMIN — BUPIVACAINE HYDROCHLORIDE IN DEXTROSE 1.6 ML: 7.5 INJECTION, SOLUTION SUBARACHNOID at 07:20

## 2025-02-11 RX ADMIN — DEXAMETHASONE SODIUM PHOSPHATE 4 MG: 4 INJECTION, SOLUTION INTRAMUSCULAR; INTRAVENOUS at 07:27

## 2025-02-11 RX ADMIN — Medication 100 MCG: at 07:54

## 2025-02-11 RX ADMIN — CEFAZOLIN SODIUM 2 G: 2 INJECTION, SOLUTION INTRAVENOUS at 07:23

## 2025-02-11 RX ADMIN — TRANEXAMIC ACID 1000 MG: 100 INJECTION INTRAVENOUS at 07:27

## 2025-02-11 RX ADMIN — MIDAZOLAM 2 MG: 1 INJECTION INTRAMUSCULAR; INTRAVENOUS at 07:02

## 2025-02-11 RX ADMIN — FENTANYL CITRATE 50 MCG: 50 INJECTION INTRAMUSCULAR; INTRAVENOUS at 07:02

## 2025-02-11 RX ADMIN — ONDANSETRON 4 MG: 2 INJECTION, SOLUTION INTRAMUSCULAR; INTRAVENOUS at 07:27

## 2025-02-11 RX ADMIN — KETOROLAC TROMETHAMINE 15 MG: 30 INJECTION, SOLUTION INTRAMUSCULAR; INTRAVENOUS at 09:02

## 2025-02-11 RX ADMIN — Medication 50 MCG: at 09:03

## 2025-02-11 RX ADMIN — PROPOFOL 125 MCG/KG/MIN: 10 INJECTION, EMULSION INTRAVENOUS at 07:24

## 2025-02-11 RX ADMIN — ACETAMINOPHEN 650 MG: 325 TABLET ORAL at 13:22

## 2025-02-11 ASSESSMENT — PAIN - FUNCTIONAL ASSESSMENT
PAIN_FUNCTIONAL_ASSESSMENT: 0-10

## 2025-02-11 ASSESSMENT — PAIN SCALES - GENERAL
PAIN_LEVEL: 0
PAINLEVEL_OUTOF10: 0 - NO PAIN
PAINLEVEL_OUTOF10: 1
PAINLEVEL_OUTOF10: 0 - NO PAIN

## 2025-02-11 ASSESSMENT — ACTIVITIES OF DAILY LIVING (ADL)
LACK_OF_TRANSPORTATION: NO
ADLS_ADDRESSED: YES
ADL_ASSISTANCE: INDEPENDENT

## 2025-02-11 ASSESSMENT — COLUMBIA-SUICIDE SEVERITY RATING SCALE - C-SSRS
6. HAVE YOU EVER DONE ANYTHING, STARTED TO DO ANYTHING, OR PREPARED TO DO ANYTHING TO END YOUR LIFE?: NO
2. HAVE YOU ACTUALLY HAD ANY THOUGHTS OF KILLING YOURSELF?: NO
1. IN THE PAST MONTH, HAVE YOU WISHED YOU WERE DEAD OR WISHED YOU COULD GO TO SLEEP AND NOT WAKE UP?: NO

## 2025-02-11 ASSESSMENT — COGNITIVE AND FUNCTIONAL STATUS - GENERAL: MOBILITY SCORE: 24

## 2025-02-11 ASSESSMENT — PAIN DESCRIPTION - ORIENTATION: ORIENTATION: LEFT

## 2025-02-11 ASSESSMENT — PAIN DESCRIPTION - LOCATION: LOCATION: KNEE

## 2025-02-11 NOTE — PROGRESS NOTES
Met with Patient and Family at bedside- Patient is s/p Left Total Knee Replacement with Dr. Katie Ornelas.  Discussion with patient included education on the following topics: [facility] Education: Wound Care (Bandage Care & Removal, Personal Hygiene & Infection Prevention), Post-Op Activity (Home PT Regimen, Movement Precautions, Assistive Equipment & 1-2hr Movement), Post-Op Precautions (Falls, Blood Clots & Constipation), Cold-Therapy (Ice vs. Cold Therapy Machines) , Methods for Symptom Management (Pain, Nausea, Swelling & Constipation), Importance of Post-op Prescriptions, How to Obtain Medication Refills, When to Resume Driving & Who to Call, Use of Re-Compose & Staff Contact Information, When to call 9-1-1, and When to call the Surgeon's Office.  Patient Did Not Complete - Patient had surgery within the last 12 months class prior to surgery.  Patient is able to verbalize understanding of class content/post-operative discussion.  Contact information was provided to patient for support and assistance during the post-operative period.    At the time of this discussion, the patient's plan includes:    Discharge Date/Disposition:  Home, Today with, Home Care Services  Discharge Needs: No Equipment Needs Identified  Medications/Pharmacy: [email or web address] service utilized for discharge prescriptions through [facility]

## 2025-02-11 NOTE — PROGRESS NOTES
Physical Therapy Evaluation & Treatment    Patient Name: Kun Castle Jr  MRN: [id number]  Department: [facility] PACU  Room: [facility] OR  Today's Date: 2/11/2025   Time Calculation  Start Time: 1328  Stop Time: 1423  Time Calculation (min): 55 min    Assessment/Plan   PT Assessment  PT Assessment Results: Decreased strength, Decreased range of motion, Decreased mobility, Decreased coordination, Impaired sensation, Orthopedic restrictions, Pain  Rehab Prognosis: Excellent  Barriers to Discharge Home: No anticipated barriers  Evaluation/Treatment Tolerance: Patient tolerated treatment well  Strengths: Ability to acquire knowledge, Access to adaptive/assistive products, Attitude of self, Capable of completing ADLs semi/independent, Rehab experience, Support of Caregivers  End of Session Communication: Bedside nurse (DME tech)  Assessment Comment: Pt low complexity eval presenting with impaired sensation, impaired LE coordination, soft L knee, and deficits to functional mobility following L TKA performed on 2/11/2025. Education regarding TKA precautions provided with handout issued; pt verbalized understanding. Therapeutic exercises performed; HEP handout provided. Pt able to participate in bed mobility, functional transfers, ambulation with walker, and stair negotiation at high functional level without knee buckle or LOB. PT recommends DC home with 24/7 supervision and HHPT.    End of Session Patient Position: Up in chair, Alarm off, caregiver present with LLE elevated and extended with ice to surgical site. Call light left in reach; patient instructed not to get up on own and verbalized understanding of this. RN aware. Orthopedic coordinator in with pt at PT exit.     IP OR SWING BED PT PLAN  Inpatient or Swing Bed: Inpatient  PT Plan  Treatment/Interventions: Bed mobility, Transfer training, Gait training, Stair training, Strengthening, Range of motion, Therapeutic exercise, Therapeutic activity, Home exercise  program, Positioning  PT Plan: Ongoing PT  PT Frequency: BID  PT Discharge Recommendations: Low intensity level of continued care  Equipment Recommended upon Discharge: Wheeled walker  PT Recommended Transfer Status: Stand by assist, Assistive device  PT - OK to Discharge: Yes      Subjective     General Visit Information:  General  Reason for Referral: L TKA (2/11/2025)  Referred By: Dr. Ornelas  Past Medical History Relevant to Rehab: OA, GERD, HLD, HTN, orthostatic hypotension, sciatica, RA, appendectomy, cholecystectomy, hernia repair, R TKA  Family/Caregiver Present: Yes (daughter Constance and sister)  Prior to Session Communication: Bedside nurse  Patient Position Received: On cart  General Comment: Session cleared by RN. Pt very pleasant and agreeable to PT evaluation. Dressing to L knee dry & intact.     Home Living:  Home Living  Type of Home: House  Lives With: Spouse (daughter Constance will be staying with pt for 1 week upon DC)  Home Adaptive Equipment: Walker rolling or standard, Cane  Home Layout: Two level, Stairs to alternate level with rails  Alternate Level Stairs-Rails: Right  Alternate Level Stairs-Number of Steps: 2+13  Home Access: Stairs to enter without rails  Entrance Stairs-Number of Steps: 1 curb JOAQUÍN  Bathroom Shower/Tub: Walk-in shower  Bathroom Equipment: Grab bars in shower, Built-in shower seat  Prior Level of Function:  Prior Function Per Pt/Caregiver Report  Level of [place]: Independent with ADLs and functional transfers, Independent with homemaking with ambulation  ADL Assistance: Independent  Homemaking Assistance: Independent  Ambulatory Assistance: Independent  Vocational: Retired  Prior Function Comments: Pt denies falls prior to admission.  Precautions:  Precautions  LE Weight Bearing Status: Weight Bearing as Tolerated  Medical Precautions: Fall precautions  Post-Surgical Precautions: Right total knee precautions     Date/Time Vitals Session Patient Position Pulse Resp  SpO2 BP MAP (mmHg)    02/11/25 1430 --  --  92  16  --  176/89  --               Objective   Pain:  Pain Assessment  Pain Assessment: 0-10  0-10 (Numeric) Pain Score: 0 - No pain  Pain Interventions: Cold applied, Ambulation/increased activity, Elevated  Cognition:  Cognition  Overall Cognitive Status: Within Functional Limits  Attention: Within Functional Limits  Memory: Within Funtional Limits  Problem Solving: Within Functional Limits  Numeric Reasoning: Within Functional Limits  Abstract Reasoning: Within Functional Limits  Safety/Judgement: Within Functional Limits  Insight: Within function limits  Impulsive: Within functional limits  Processing Speed: Within funtional limits    General Assessments:  Activity Tolerance  Endurance: Endurance does not limit participation in activity    Sensation  Light Touch: Partial deficits in the LLE (distal aspect of LLE impaired; RLE WFL)  Proprioception: No apparent deficits         Coordination  Movements are Fluid and Coordinated: No  Lower Body Coordination: impaired from post-op limitations    Postural Control  Postural Control: Within Functional Limits    Static Sitting Balance  Static Sitting-Balance Support: Feet supported  Static Sitting-Level of Assistance: Independent  Dynamic Sitting Balance  Dynamic Sitting-Balance Support: Feet supported  Dynamic Sitting-Level of Assistance: Independent    Static Standing Balance  Static Standing-Balance Support: Bilateral upper extremity supported (with rolling walker)  Static Standing-Level of Assistance: Distant supervision  Dynamic Standing Balance  Dynamic Standing-Balance Support: Bilateral upper extremity supported (with rolling walker)  Dynamic Standing-Level of Assistance: Close supervision  Functional Assessments:  ADL  ADL's Addressed: Yes  LE Dressing Deficit: Thread LLE into underwear, Thread RLE into underwear, Thread LLE into pants, Thread RLE into pants, Verbal cueing, Supervision/safety, Increased time to  "complete (pt completed LE dressing seated EOB with cues from PT to dress surgical limb first. able to stand at walker and adjust clothing with BUE support removed from walker, no LOB or knee buckle.)    Bed Mobility  Bed Mobility: Yes  Bed Mobility 1  Bed Mobility 1: Supine to sitting, Scooting  Level of Assistance 1: Independent    Transfers  Transfer: Yes  Transfer 1  Transfer From 1: Bed to, Stand to  Transfer to 1: Stand, Chair with arms, Wheelchair  Technique 1: Sit to stand, Stand to sit  Transfer Device 1: Walker, Gait belt  Transfer Level of Assistance 1: Distant supervision, Minimal verbal cues (cues for hand placement)  Trials/Comments 1: x4    Ambulation/Gait Training  Ambulation/Gait Training Performed: Yes  Ambulation/Gait Training 1  Surface 1: Level tile  Device 1: Rolling walker  Gait Support Devices: Gait belt  Assistance 1: Close supervision, Minimal verbal cues (cues for sequecning, safe walker approximation)  Quality of Gait 1: Decreased step length, Antalgic, Soft knee(s) (no knee buckle or LOB but soft L knee noted pt able to correct with cues to engage L quad. decreased kevin, recirpocal pattern)  Comments/Distance (ft) 1: 100 feet    Stairs  Stairs: Yes  Stairs  Rails 1: Right  Device 1: Railing  Assistance 1: Close supervision, Minimal verbal cues (cues for sequencing, body mechanics)  Comment/Number of Steps 1: pt negotiated three 6\" steps with BUE support on R rail via step to pattern. no knee buckle or LOB  Stairs 2  Curb Step 2: Yes  Device 2: Wheeled walker  Assistance 2: Close supervision, Minimal verbal cues (cues for sequencing, safe walker approximation)  Comment/Number of Steps 2: pt negotiated x2 curb steps in pt room via step to pattern with appropriate sequencing following cues. no knee buckle or LOB  Extremity/Trunk Assessments:  RUE   RUE : Within Functional Limits  LUE   LUE: Within Functional Limits  RLE   RLE : Within Functional Limits  LLE   LLE : Exceptions to WFL  AROM " LLE (degrees)  LLE AROM Comment: L knee AROM 0-90 degrees  Strength LLE  LLE Overall Strength: Greater than or equal to 3/5 as evidenced by functional mobility  Treatments:  Therapeutic Exercise  Therapeutic Exercise Performed: Yes B ankle pumps, L quad sets, L gluteal sets, L heel slides, L SAQ, L hip abduction, and L SLR x 10 reps each.      Therapeutic Activity  Therapeutic Activity Performed: Yes  Therapeutic Activity 1: PT provided pt education regarding TKA precautions, weightbearing status, HEP, sequencing and body mechanics during functional mobility, POC, post-op pain expectations, recommendation for 24/7 supervision and standby assist upon DC; pt and caregivers verbalized understanding.  Outcome Measures:  [facility] Basic Mobility  Turning from your back to your side while in a flat bed without using bedrails: None  Moving from lying on your back to sitting on the side of a flat bed without using bedrails: None  Moving to and from bed to chair (including a wheelchair): None  Standing up from a chair using your arms (e.g. wheelchair or bedside chair): None  To walk in hospital room: None  Climbing 3-5 steps with railing: None  Basic Mobility - Total Score: 24        Education Documentation  Body Mechanics, taught by Brianne Clay PT at 2/11/2025  3:10 PM.  Learner: Family, Patient  Readiness: Eager  Method: Explanation, Demonstration, Handout  Response: Verbalizes Understanding, Demonstrated Understanding    Handouts, taught by Brianne Clay PT at 2/11/2025  3:10 PM.  Learner: Family, Patient  Readiness: Eager  Method: Explanation, Demonstration, Handout  Response: Verbalizes Understanding, Demonstrated Understanding    Precautions, taught by Brianne Clay PT at 2/11/2025  3:10 PM.  Learner: Family, Patient  Readiness: Eager  Method: Explanation, Demonstration, Handout  Response: Verbalizes Understanding, Demonstrated Understanding    Home Exercise Program, taught by Brianne Clay PT at  2/11/2025  3:10 PM.  Learner: Family, Patient  Readiness: Eager  Method: Explanation, Demonstration, Handout  Response: Verbalizes Understanding, Demonstrated Understanding    Mobility Training, taught by Brianne Clay PT at 2/11/2025  3:10 PM.  Learner: Family, Patient  Readiness: Eager  Method: Explanation, Demonstration, Handout  Response: Verbalizes Understanding, Demonstrated Understanding    Education Comments  No comments found.          Brianne Clay PT, DPT

## 2025-02-11 NOTE — PERIOPERATIVE NURSING NOTE
Patient has eaten.    Respiratory therapy has completed IS education with patient.    Physical therapy has completed assessment and education; patient has functionally cleared for discharge.  Patient has voided.     has seen patient and home needs are arranged.     no complaint of pain.    no complaint of nausea.     Family at bedside; discussed discharge instructions with patient and Family. All questions at this time answered.     Pharmacy has delivered patient discharge medications.     Patient has completed requirements for discharge from [facility].      Patient clinically appropriate for discharge. IV removed and patient transported to discharge area via wheelchair.    Patient given ice man and DME instructed how to use it.   All questions were answered.

## 2025-02-11 NOTE — ANESTHESIA PREPROCEDURE EVALUATION
Patient: Kun Castle Jr    Procedure Information       Date/Time: 02/11/25 0700    Procedure: Arthroplasty Resurfacing Total Knee (DEPUY) *Rapid Recovery* (Left: Knee) - Adductor canal, IPACK    Location: [facility] / [facility]    Surgeons: Katie Ornelas MD            Relevant Problems   Anesthesia (within normal limits)      Cardiac   (+) Hyperlipidemia   (+) Hypertension      Pulmonary   (+) Asthma due to seasonal allergies (HHS-HCC)      Neuro (within normal limits)      GI (within normal limits)      /Renal (within normal limits)      Liver (within normal limits)      Endocrine (within normal limits)      Hematology (within normal limits)      Musculoskeletal   (+) Rheumatoid arthritis   (+) Unilateral primary osteoarthritis, left knee   (+) Unilateral primary osteoarthritis, right knee      HEENT (within normal limits)      ID (within normal limits)      Skin (within normal limits)      GYN (within normal limits)     Past Medical History:   Diagnosis Date    Arthritis     Asymptomatic varicose veins of bilateral lower extremities 07/27/2015    Varicose veins of legs    Cataract     Diverticulosis     GERD (gastroesophageal reflux disease)     Hiatal hernia     Hyperlipidemia     Hypertension     Joint pain     Nephrolithiasis     Nocturia 07/25/2016    Nocturia    Orthostatic hypotension 12/02/2015    Sympathotonic orthostatic hypotension    Pain in right knee 08/28/2015    Recurrent knee pain, right    Personal history of diseases of the skin and subcutaneous tissue 07/25/2015    History of rhinophyma    Personal history of other diseases of the digestive system     History of gastroesophageal reflux (GERD)    Personal history of other diseases of the nervous system and sense organs 10/29/2015    History of sciatica    Rheumatoid arthritis     Sciatica, unspecified side 12/02/2015    Acute sciatica    Vitamin D deficiency, unspecified 04/05/2016    Vitamin D deficiency     Past Surgical History:  "  Procedure Laterality Date    ADENOIDECTOMY      APPENDECTOMY      CATARACT EXTRACTION      CATARACT EXTRACTION, BILATERAL      CHOLECYSTECTOMY      COLONOSCOPY  05/14/2014    Colonoscopy (Fiberoptic)    HERNIA REPAIR      JOINT REPLACEMENT      KNEE ARTHROPLASTY      SINUS SURGERY      TENDON REPAIR Left     left hand    TONSILLECTOMY       No Known Allergies    Lab Results   Component Value Date    WBC 5.50 01/14/2025    HGB 12.86 01/14/2025    HCT 38.6 01/14/2025    MCV 97.3 (H) 01/14/2025    .2 01/14/2025     Lab Results   Component Value Date    GLUCOSE 92 01/14/2025    CALCIUM 8.6 01/14/2025     01/14/2025    K 4.7 01/14/2025    CO2 27 01/14/2025     01/14/2025    BUN 29 (H) 01/14/2025    CREATININE 1.08 01/14/2025     No results found for: \"INR\", \"PROTIME\"    Clinical information reviewed:   Tobacco  Allergies  Meds   Med Hx  Surg Hx   Fam Hx  Soc Hx        NPO Detail:  NPO/Void Status  Date of Last Liquid: 02/10/25  Time of Last Liquid: 1800  Date of Last Solid: 02/10/25  Time of Last Solid: 1800  Last Intake Type: Clear fluids  Time of Last Void: 0345         Physical Exam    Airway  Mallampati: II  TM distance: >3 FB  Neck ROM: full     Cardiovascular    Dental    Pulmonary    Abdominal            Anesthesia Plan    History of general anesthesia?: yes  History of complications of general anesthesia?: no    ASA 3     regional and spinal     intravenous induction   Postoperative administration of opioids is intended.  Anesthetic plan and risks discussed with patient.  Use of blood products discussed with patient who.      "

## 2025-02-11 NOTE — ANESTHESIA PROCEDURE NOTES
Spinal Block    Start time: 2/11/2025 7:15 AM  End time: 2/11/2025 7:20 AM  Reason for block: primary anesthetic and post-op pain management  Staffing  Performed: SRNA and attending   Authorized by: Kendal Washburn MD MPH    Performed by: Alycia Lomas    Preanesthetic Checklist    Block Timeout  RN/Licensed healthcare professional reads aloud to the Anesthesia provider and entire team: Patient identity, procedure with side and site, patient position, and as applicable the availability of implants/special equipment/special requirements.    Timeout performed at: 2/11/2025 7:15 AM  Spinal Block  Patient position: sitting  Prep: Betadine  Sterility prep: drape, gloves and mask  Sedation level: light sedation  Patient monitoring: continuous pulse oximetry, blood pressure and heart rate  Approach: midline  Vertebral space: L4-5  Injection technique: single-shot  Needle  Needle type: pencil-point   Needle gauge: 24 G  Needle length: 4 in  Free flowing CSF: yes    Assessment  Sensory level: T4  Block outcome: patient comfortable  Procedure assessment: patient tolerated procedure well with no immediate complications  Additional Notes  Sterile prep and drape  3 mL 1% lidocaine local

## 2025-02-11 NOTE — ANESTHESIA PROCEDURE NOTES
Peripheral Block    Patient location during procedure: pre-op  Start time: 2/11/2025 7:05 AM  End time: 2/11/2025 7:06 AM  Reason for block: at surgeon's request and post-op pain management  Staffing  Performed: attending   Authorized by: Arti Vee MD    Performed by: Arti Vee MD  Preanesthetic Checklist  Completed: patient identified, IV checked, site marked, risks and benefits discussed, surgical consent, monitors and equipment checked, pre-op evaluation and timeout performed   Timeout performed at: 2/11/2025 7:01 AM  Peripheral Block  Patient position: laying flat  Prep: ChloraPrep  Patient monitoring: heart rate, continuous pulse ox and cardiac monitor  Block type: adductor canal  Laterality: left  Injection technique: single-shot  Guidance: ultrasound guided  Local infiltration: ropivacaine  Infiltration strength: 0.5 %  Dose: 20 mL  Needle  Needle gauge: 21 G  Needle length: 10 cm  Needle localization: ultrasound guidance     image stored in chart  Assessment  Injection assessment: incremental injection, negative aspiration for heme, local visualized surrounding nerve on ultrasound and no paresthesia on injection  Paresthesia pain: none  Heart rate change: no  Slow fractionated injection: yes  Additional Notes  Time out was performed with block nurse.  Site was prepped with chlorhexadine scrub.  Needle was inserted under ultrasound guidance and advanced to the correct position.  20mL Ropivacaine with 5mg Decadron was injected in 5cc increments with negative aspiration every 5ccs.  The patient tolerated the procedure well.  No complications.

## 2025-02-11 NOTE — HH CARE COORDINATION
Home Care received a Referral for Physical Therapy and Occupational Therapy. We have processed the referral for a Start of Care on 2/12/25.     If you have any questions or concerns, please feel free to contact us at [phone]. Follow the prompts, enter your five digit zip code, and you will be directed to your care team on [facility].

## 2025-02-11 NOTE — ANESTHESIA POSTPROCEDURE EVALUATION
Patient: Kun Castle Jr    Procedure Summary       Date: 02/11/25 Room / Location: [facility] / [facility]    Anesthesia Start: 0712 Anesthesia Stop: 0943    Procedure: Arthroplasty Resurfacing Total Knee (DEPUY) *Rapid Recovery* (Left: Knee) Diagnosis:       Unilateral primary osteoarthritis, left knee      (Unilateral primary osteoarthritis, left knee [M17.12])    Surgeons: Katie Ornelas MD Responsible Provider: Kendal Washburn MD MPH    Anesthesia Type: regional, spinal ASA Status: 3            Anesthesia Type: regional, spinal    Vitals Value Taken Time   /62 02/11/25 1005   Temp 36 °C (96.8 °F) 02/11/25 1020   Pulse 69 02/11/25 1020   Resp 16 02/11/25 1020   SpO2 98 % 02/11/25 1020       Anesthesia Post Evaluation    Patient location during evaluation: PACU  Patient participation: complete - patient participated  Level of consciousness: awake and alert  Pain score: 0  Pain management: adequate  Multimodal analgesia pain management approach  Airway patency: patent  Two or more strategies used to mitigate risk of obstructive sleep apnea  Cardiovascular status: acceptable  Respiratory status: acceptable  Hydration status: acceptable  Postoperative Nausea and Vomiting: none    There were no known notable events for this encounter.

## 2025-02-11 NOTE — BRIEF OP NOTE
Date: 2025  OR Location: [facility]    Name: Kun Castle Jr, : 1945, Age: 79 y.o., MRN: [id number], Sex: male    Diagnosis  Pre-op Diagnosis      * Unilateral primary osteoarthritis, left knee [M17.12] Post-op Diagnosis     * Unilateral primary osteoarthritis, left knee [M17.12]     Procedures  Arthroplasty Resurfacing Total Knee (DEPUY) *Rapid Recovery*  14778 - [place] ARTHRP KNE CONDYLE&PLATU MEDIAL&LAT COMPARTMENTS      Surgeons      * Katie Ornelas - Primary    Resident/Fellow/Other Assistant:  Surgeons and Role:  * No surgeons found with a matching role *    Staff:   Scrub Person: Bailey  Scrub Person: Juan A  Scrub Person: Kendal  Circulator: Bessie Saab Circulator: Penelope    Anesthesia Staff: Anesthesiologist: Kendal Washburn MD MPH  CRNA: SUSAN Chung-CNP, APRN-CRNA  SRNA: Alycia Lomas  Frontline Breaker: AJIT Aranda    Procedure Summary  Anesthesia: Regional, Spinal  ASA: III  Estimated Blood Loss: 50 mL  Intra-op Medications:   Administrations occurring from 0700 to 0950 on 25:   Medication Name Total Dose   BUPivacaine-EPINEPHrine (Marcaine w/EPI) 30 mL, methylPREDNISolone acetate (DEPO-Medrol) 40 mg in sodium chloride 0.9% 22 mL syringe 53 mL   ceFAZolin (Ancef) injection 500 mg   bupivacaine PF 0.75 %-dextrose 8.25 % (Sensorcaine) intrathecal 1.6 mL   dexAMETHasone (Decadron) injection 4 mg/mL 4 mg   ketorolac (Toradol) 15 mg 15 mg   LR bolus Cannot be calculated   ondansetron 2 mg/mL 4 mg   phenylephrine 100 mcg/mL syringe 10 mL (prefilled) 200 mcg   propofol (Diprivan) injection 10 mg/mL 643.17 mg   tranexamic acid (Cyklokapron) injection 2,000 mg   ceFAZolin (Ancef) 2 g in dextrose (iso)  mL 2 g   fentaNYL PF (Sublimaze) injection 50 mcg 50 mcg   midazolam (Versed) injection 2 mg 2 mg              Anesthesia Record               Intraprocedure I/O Totals          Intake    LR bolus 800.00 mL    Tranexamic Acid 0.00 mL    The total shown is the total  volume documented since Anesthesia Start was filed.    Total Intake 800 mL       Output    Est. Blood Loss 50 mL    Total Output 50 mL       Net    Net Volume 750 mL          Specimen: No specimens collected     Findings: See operative record    Complications:  None; patient tolerated the procedure well.     Disposition: PACU - hemodynamically stable.  Condition: stable  Specimens Collected: No specimens collected  Attending Attestation: I was present and scrubbed for the entire procedure.    Katie Ornelas  Phone Number: [phone]

## 2025-02-11 NOTE — PROGRESS NOTES
Medication Education     Medication education for Kun Castle Jr was provided to the patient and family for the following medication(s):  Aspirin  Tylenol  Cefadroxil  Docusate  Meloxicam  Zofran  Oxycodone  Pantoprazole  Senna  tramadol        Medication education provided by a Pharmacist:  Dose, frequency, storage Proper dose, indication, possible ADRs Refilling the medication  How the medication works and benefits of taking it Benefits of taking the medication  Importance of compliance    Identified potential barriers to education:  None    Method(s) of Education:  Verbal Written materials provided and reviewed    An opportunity to ask questions and receive answers was provided.     Assessment of understanding the patient and family:  2= meets goals/outcomes    Additional Notes (if applicable):     M2B delivered.    Foster Craven, PharmD

## 2025-02-11 NOTE — OP NOTE
Operative Note    PREOPERATIVE DIAGNOSIS: Left knee Osteoarthritis    POSTOPERATIVE DIAGNOSIS: Left knee osteoarthritis    PROCEDURE:   Left total knee arthroplasty    Surgeon:  Katie Ornelas MD     First Assist:  SA DOYLE Edmonds     Anesthesia Staff:  Anesthesiologist: Kendal Washburn MD MPH  CRNA: Dieudonne Garza, APRN-CNP, APRN-CRNA  SRNA: Alycia Lomas  Frontline Breaker: Jessica Knapp, APRN-CRNA    Anesthesia Type: Spinal     Specimens:  No specimens collected    Estimated Blood Loss:  50 mL    Implants:  Implant Name Type Inv. Item Serial No.  Lot No. LRB No. Used Action   CEMENT, BONE SIMPLEX P W/TOBRA - [id number] Joint CEMENT, BONE SIMPLEX P W/TOBRA  [employer] [id number] Left 1 Implanted   CEMENT, BONE SIMPLEX P W/TOBRA - [id number] Joint CEMENT, BONE SIMPLEX P W/TOBRA  [employer] [id number] Left 1 Implanted   SIZE 6, ATTUNEFEMORAL COMPONENT, CEMENTED, CRUCIATE RETAINING, LEFT     [id number] Left 1 Implanted   SIZE 6 ATTUNE FIXED BEARING TIBIAL BASE, CEMENTEDDUPLICATE, PLEASE TRANSITION TO CAT# [id number]     [id number] Left 1 Implanted   DOME, PATELLA, MEDIALIZED, 38MM - [id number] Joint Knee DOME, PATELLA, MEDIALIZED, 38MM  DEPU[employer] [id number] Left 1 Implanted   SIZE 6, 6.0MM, ATTUNE CRUCIATE RETAINING FIXED BEARING TIBIAL INSERT, AOX, LEFT, MEDIAL STABILIZED     [id number] Left 1 Implanted   Previous    Complications: None    Findings: End-stage arthritis of left knee.  Fixed varus deformity.  10 degree flexion contracture.  Rheumatoid arthritis.    Clinical History:  The patient presents with severe osteoarthritis of the knee.  The patient has failed conservative management including activity modification, anti-inflammatory medications, and injections. All options were discussed in detail with the patient and the patient has decided that they would like to proceed with total knee replacement after informed consent was obtained in the office and on the day of surgery.    Description  of procedure:  Informed consent was obtained. The operative site was marked. The patient was transferred to the operating room. A combined spinal epidural anesthetic and saphenous nerve block were performed. The patient received preoperative antibiotics and tranexamic acid.     The operative leg was scrubbed and prepped and draped in standard sterile fashion.  A surgical time out was performed confirming the operative site. Tourniquet use was deferred.     A longitudinal incision approximately 15 cm in length was made centered over the patella. Soft tissue was dissected sharply down to the extensor mechanism. A medial parapatellar arthrotomy was performed to expose the knee joint. Soft tissue was elevated off the proximal medial tibia for exposure. Fat pad was excised. The patella was dislocated laterally.     Attention was first turned to the femur. The femoral canal was opened with a drill and an intramedullary elizabeth was passed. A 3-degree valgus distal femoral cut relative to the anatomic axis was made without difficulty.      Attention was then turned to the tibia. The extramedullary guide was placed around the tibia. The tibial cutting block was then pinned for a slightly varus relative to mechanical axis cut with approximately 5 degrees of posterior slope. The block was then pinned to remove 3 mm of bone from the worn medial side. The tibial cut was made without difficulty.    The extension gap was then checked. I was happy with medial lateral balance and that I could bring the knee out into full extension with a 6 mm spacer in place. Attention was turned back to the femur. The gap  guide was placed and expanded by 6 mm to match extension gap. The sizer was pinned in place looking at rotation based on posterior condyles, trans-epicondylar axis and AP axis. The femur was sized and pins were drilled for the AP cutting block in approximately 3 degrees of external rotation based on the epicondylar axis and  vikas's. The AP cutting block was pinned in place and the anterior cut as well as the posterior cut and chamfer cuts were made without difficulty. The AP cutting block was removed and all bone fragments were removed.    A laminar  was placed on the lateral side of the knee. The remaining medial meniscus as well as osteophytes from the posterior aspect of the medial femoral condyle were removed. A second laminar  was placed on the medial side of the knee and remaining lateral meniscus as well as osteophytes from the posterior aspect of the lateral femoral condyle were excised.  The flexion gap balance was tested at this point. There was equal balance between flexion and extension as well as medial and lateral.  The femoral sulcus, cut was then performed.    Attention was turned back to the tibia. The tibia was sized and a tibial trial was pinned in appropriate rotation. A trial femur was put in place. A cruciate retaining trial polyethylene component was placed, and the knee was taken through range of motion. The knee was stable through an arc of motion and patellar tracking was midline. The trial polyethylene component was removed. Femoral lug holes were drilled. Osteophytes in the femoral notch were removed with an osteotome. The tibial keel punch was performed. A medial downsizing osteotomy was performed using a rongeur with tibial tray in place.     Attention was then turned to the patella. A freehand patellar cut was made without difficulty. Lug holes were drilled for the patella and a trial patella was put in place. The knee was once again taken through range of motion and the knee was stable with excellent patellar tracking through range of motion.    All trial implants were removed. The posterior capsule was cauterized to minimize postoperative bleeding. A periarticular injection of Marcaine with epinephrine was injected into the periarticular soft tissues. Bone ends were irrigated and  dried. A femoral bone plug was placed.     Implants were cemented into place. A trial 6 mm polyethylene liner was put in place to compress implants in extension. Hemostasis was obtained with electrocautery. All excess cement was removed. The cement was allowed to harden. The knee was then taken through a range of motion and I was happy with the balance on the medial and lateral side of the knee through an arc of motion as well as the kinematic tracking.     The trial polyethylene was removed. The locking mechanism was irrigated removing all debris. Excess cement was removed. A final 6 mm medial stabilized polyethylene was put in place and locked into place. The locking mechanism was tested and was intact. The knee was taken through final range of motion to test for stability and patellar tracking. The PCL was recessed slightly from its femoral insertion to optimize femoral rollback. The knee was irrigated with dilute betadine solution and saline pulsatile lavage.    At this point we began our closure. Several #2 ethibonds followed by Zero Vicryls and then #1 stratafix were used for closure of the arthrotomy. Subcutaneous tissue was closed in layers with 0 Vicryl followed by 3-0 Vicryl and skin was closed with 3-0 stratafix and Dermabond. Sterile dressings including Mepilex and Zaheer Collado Dressing were applied. At the end of the case all needle and instrument counts were correct. There were no complications. The patient was transferred to the bed and then recovery room in stable condition. A surgical debrief was performed at the end of the procedure.    A 22 modifier is being added to this case for increased complexity secondary to fixed varus deformity with flexion contracture as well as rheumatoid arthritis resulting in severe inflammation throughout the knee which required significant debridement.  This led to increased surgical time, need for additional help in the operating room, and additional retractor use  as compared to a standard arthroplasty procedure.    Post-operative Plan:  The patient will be weight-bearing as tolerated. They will receive perioperative antibiotics and be started on aspirin for DVT prophylaxis with mechanical compression devices. They will be discharged from the hospital when pain is controlled and they have met all PT goals.    Attestation Statement:  I was present for and performed all critical portions of the procedure.      Katie Ornelas MD    This note was transcribed with dictation software.  Please excuse any typographical errors, program misunderstandings leading to inadvertent insertions or deletions of inappropriate wording, pronoun errors and other unintentional transcription errors not noticed on proof-reading.

## 2025-02-11 NOTE — PROGRESS NOTES
79 yr old male admitted RR following left knee replacement with Dr. Ornelas.  Plan is home today with [facility] PT. SOC confirmed on 2/12/25.  Post op follow up on Friday Feb 28, 2025 11:20 AM-[facility].  Sister will transport home and assist family with patients care as needed.   02/11/25 1122   Discharge Planning   Living Arrangements Spouse/significant other   Support Systems Spouse/significant other;Family members   Assistance Needed transportation   Type of Residence Private residence   Number of Stairs to Enter Residence 1   Number of Stairs Within Residence 13   Do you have animals or pets at home? No   Who is requesting discharge planning? Provider   Home or Post Acute Services In home services   Type of Home Care Services Home PT   Expected Discharge Disposition Home H  ( Home Care)   Does the patient need discharge transport arranged? No   Financial Resource Strain   How hard is it for you to pay for the very basics like food, housing, medical care, and heating? Not hard   Housing Stability   In the last 12 months, was there a time when you were not able to pay the mortgage or rent on time? N   In the past 12 months, how many times have you moved where you were living? 0   At any time in the past 12 months, were you homeless or living in a shelter (including now)? N   Transportation Needs   In the past 12 months, has lack of transportation kept you from medical appointments or from getting medications? no   In the past 12 months, has lack of transportation kept you from meetings, work, or from getting things needed for daily living? No   Patient Choice   Provider Choice list and CMS website (https://medicare.gov/care-compare#search) for post-acute Quality and Resource Measure Data were provided and reviewed with: Patient   Patient / Family choosing to utilize agency / facility established prior to hospitalization No   Stroke Family Assessment   Stroke Family Assessment Needed No

## 2025-02-11 NOTE — PERIOPERATIVE NURSING NOTE
Reviewed discharge instructions with patient and family and all questions were answered.  Awaiting on PT to see patient.

## 2025-02-11 NOTE — PERIOPERATIVE NURSING NOTE
Patient to room pre-op 24 for RR. Team notified of patient arrival.      Bedside handoff report done in PACU. Patient transported via cart to room.      Plan of day discussed with patient and family; all questions answered at this time. Patient and family verbalized understanding that patient is not to ambulate without RN at bedside. Bed locked and lowered, call light in reach. Patient safety maintained.     Orders reviewed and released.

## 2025-02-12 ENCOUNTER — HOME CARE VISIT (OUTPATIENT)
Dept: HOME HEALTH SERVICES | Facility: HOME HEALTH | Age: 80
End: 2025-02-12
Payer: MEDICARE

## 2025-02-12 VITALS
TEMPERATURE: 97.5 F | SYSTOLIC BLOOD PRESSURE: 130 MMHG | HEART RATE: 68 BPM | OXYGEN SATURATION: 100 % | DIASTOLIC BLOOD PRESSURE: 70 MMHG

## 2025-02-12 PROCEDURE — G0151 HHCP-SERV OF PT,EA 15 MIN: HCPCS | Mod: HHH | Performed by: PHYSICAL THERAPIST

## 2025-02-12 PROCEDURE — 169592 NO-PAY CLAIM PROCEDURE

## 2025-02-12 ASSESSMENT — ENCOUNTER SYMPTOMS
MUSCLE WEAKNESS: 1
PAIN LOCATION - PAIN SEVERITY: 1/10
PAIN: 1
LIMITED RANGE OF MOTION: 1
PERSON REPORTING PAIN: PATIENT
PAIN LOCATION: LEFT KNEE

## 2025-02-12 ASSESSMENT — ACTIVITIES OF DAILY LIVING (ADL)
ENTERING_EXITING_HOME: MINIMUM ASSIST
OASIS_M1830: 03

## 2025-02-14 ENCOUNTER — HOME CARE VISIT (OUTPATIENT)
Dept: HOME HEALTH SERVICES | Facility: HOME HEALTH | Age: 80
End: 2025-02-14
Payer: MEDICARE

## 2025-02-14 PROCEDURE — G0152 HHCP-SERV OF OT,EA 15 MIN: HCPCS | Mod: HHH

## 2025-02-16 ASSESSMENT — ACTIVITIES OF DAILY LIVING (ADL)
BATHING_CURRENT_FUNCTION: INDEPENDENT
PHYSICAL TRANSFERS ASSESSED: 1
DRESSING_LB_CURRENT_FUNCTION: INDEPENDENT
DRESSING_UB_CURRENT_FUNCTION: INDEPENDENT
BATHING ASSESSED: 1
AMBULATION ASSISTANCE: 1
CURRENT_FUNCTION: INDEPENDENT
AMBULATION ASSISTANCE: INDEPENDENT

## 2025-02-16 ASSESSMENT — ENCOUNTER SYMPTOMS
PAIN SEVERITY GOAL: 0/10
LOWEST PAIN SEVERITY IN PAST 24 HOURS: 0/10
HIGHEST PAIN SEVERITY IN PAST 24 HOURS: 0/10
DENIES PAIN: 1
PERSON REPORTING PAIN: PATIENT
SUBJECTIVE PAIN PROGRESSION: UNCHANGED

## 2025-02-19 ENCOUNTER — HOME CARE VISIT (OUTPATIENT)
Dept: HOME HEALTH SERVICES | Facility: HOME HEALTH | Age: 80
End: 2025-02-19
Payer: MEDICARE

## 2025-02-19 VITALS
HEART RATE: 80 BPM | TEMPERATURE: 97.3 F | OXYGEN SATURATION: 100 % | SYSTOLIC BLOOD PRESSURE: 120 MMHG | DIASTOLIC BLOOD PRESSURE: 58 MMHG

## 2025-02-19 PROCEDURE — G0151 HHCP-SERV OF PT,EA 15 MIN: HCPCS | Mod: HHH | Performed by: PHYSICAL THERAPIST

## 2025-02-19 ASSESSMENT — ENCOUNTER SYMPTOMS
PAIN LOCATION: LEFT KNEE
HIGHEST PAIN SEVERITY IN PAST 24 HOURS: 5/10
PAIN: 1
MUSCLE WEAKNESS: 1
LIMITED RANGE OF MOTION: 1
PERSON REPORTING PAIN: PATIENT
PAIN LOCATION - PAIN SEVERITY: 0/10

## 2025-02-21 ENCOUNTER — HOME CARE VISIT (OUTPATIENT)
Dept: HOME HEALTH SERVICES | Facility: HOME HEALTH | Age: 80
End: 2025-02-21
Payer: MEDICARE

## 2025-02-21 VITALS
TEMPERATURE: 97.5 F | HEART RATE: 76 BPM | SYSTOLIC BLOOD PRESSURE: 150 MMHG | DIASTOLIC BLOOD PRESSURE: 65 MMHG | OXYGEN SATURATION: 98 %

## 2025-02-21 PROCEDURE — G0151 HHCP-SERV OF PT,EA 15 MIN: HCPCS | Mod: HHH | Performed by: PHYSICAL THERAPIST

## 2025-02-21 ASSESSMENT — ENCOUNTER SYMPTOMS
PAIN LOCATION - PAIN SEVERITY: 2/10
PAIN LOCATION: LEFT KNEE
PERSON REPORTING PAIN: PATIENT
PAIN: 1

## 2025-02-24 ENCOUNTER — HOME CARE VISIT (OUTPATIENT)
Dept: HOME HEALTH SERVICES | Facility: HOME HEALTH | Age: 80
End: 2025-02-24
Payer: MEDICARE

## 2025-02-24 VITALS
HEART RATE: 68 BPM | SYSTOLIC BLOOD PRESSURE: 144 MMHG | OXYGEN SATURATION: 100 % | DIASTOLIC BLOOD PRESSURE: 62 MMHG | TEMPERATURE: 97.3 F

## 2025-02-24 PROCEDURE — G0151 HHCP-SERV OF PT,EA 15 MIN: HCPCS | Mod: HHH | Performed by: PHYSICAL THERAPIST

## 2025-02-24 ASSESSMENT — ENCOUNTER SYMPTOMS
PAIN LOCATION: LEFT KNEE
MUSCLE WEAKNESS: 1
PAIN: 1
PAIN LOCATION - PAIN SEVERITY: 0/10
LIMITED RANGE OF MOTION: 1
PERSON REPORTING PAIN: PATIENT

## 2025-02-26 ENCOUNTER — HOME CARE VISIT (OUTPATIENT)
Dept: HOME HEALTH SERVICES | Facility: HOME HEALTH | Age: 80
End: 2025-02-26
Payer: MEDICARE

## 2025-02-26 ENCOUNTER — APPOINTMENT (OUTPATIENT)
Dept: ORTHOPEDIC SURGERY | Facility: HOSPITAL | Age: 80
End: 2025-02-26
Payer: MEDICARE

## 2025-02-26 VITALS
DIASTOLIC BLOOD PRESSURE: 62 MMHG | OXYGEN SATURATION: 100 % | TEMPERATURE: 97.2 F | HEART RATE: 63 BPM | SYSTOLIC BLOOD PRESSURE: 130 MMHG

## 2025-02-26 DIAGNOSIS — Z96.652 STATUS POST LEFT KNEE REPLACEMENT: Primary | ICD-10-CM

## 2025-02-26 PROCEDURE — G0151 HHCP-SERV OF PT,EA 15 MIN: HCPCS | Mod: HHH | Performed by: PHYSICAL THERAPIST

## 2025-02-26 PROCEDURE — 1123F ACP DISCUSS/DSCN MKR DOCD: CPT | Performed by: STUDENT IN AN ORGANIZED HEALTH CARE EDUCATION/TRAINING PROGRAM

## 2025-02-26 PROCEDURE — 99211 OFF/OP EST MAY X REQ PHY/QHP: CPT | Performed by: STUDENT IN AN ORGANIZED HEALTH CARE EDUCATION/TRAINING PROGRAM

## 2025-02-26 ASSESSMENT — ENCOUNTER SYMPTOMS
PERSON REPORTING PAIN: PATIENT
HIGHEST PAIN SEVERITY IN PAST 24 HOURS: 2/10
PAIN: 1
PAIN LOCATION: LEFT KNEE
PAIN LOCATION - PAIN SEVERITY: 0/10

## 2025-02-26 ASSESSMENT — ACTIVITIES OF DAILY LIVING (ADL)
HOME_HEALTH_OASIS: 00
OASIS_M1830: 00

## 2025-02-26 NOTE — PROGRESS NOTES
Katie Ornelas MD   Adult Reconstruction and Joint Replacement Surgery  Phone: [phone]     Fax: [phone]       Name: Kun Castle Jr  Age: 79 y.o.   : 1945   Date of Visit: 2025    KNEE REPLACEMENT POST-OP VISIT     Chief Complaint:  Left Total Knee Replacement Surgery Follow-Up    History of Present Illness:    The patient is now 15 days status post left Total knee replacement surgery.    The patient is doing home physical therapy and is progressing well.    Denies fevers or drainage from the incision.    Patient is walking with nothing for assistive device.    The patient has no fevers or drainage from the incision.  The patient does report itching and irritation around the previous mesh and dressing region.  Symptoms have improved after dressing and Prineo mesh were removed.    The patient is currently taking Tylenol for pain.     The patient is currently taking aspirin for DVT prophylaxis.    There are no concerns.    Physical Exam:    The patient is well appearing, alert and oriented to person place and time.    The incision is healing without complication. No drainage or evidence of infection.    Range of motion is excellent and strength is improving. ROM today is 0 to 105 deg.     There is no instability of the joint.    Homans sign is negative.    Neurologic, and vascular examinations are normal.    PROMs   Koos Jr-Knee Injury And Osteoarthritis Outcome Score For Joint Replacement    2024  4:53 PM EST - Filed by Patient   Instructions    The following question concerns the amount of joint stiffness you have experienced during the last week in your knee. Stiffness is a sensation of restriction or slowness in the ease with which you move your knee joint.   How severe is your knee stiffness after first wakening in the morning? Mild   What amount of knee pain have you experienced the last week during the following activities?   Twisting/pivoting on your knee None    Straightening knee fully None   Going up or down stairs None   Standing upright None   The following questions concern your physical function. By this we mean your ability to move around and to look after yourself. For each of the following activities please indicate the degree of difficulty you have experienced in the last week due to your knee.   Rising from sitting Mild   Bending to floor/ an object Mild   Koos Jr Scoring (range: 0 - 100) 79.91     [employer] 12 Item Health Survey (Vr-12)    2/28/2024  9:17 AM EST - Filed by Shanita Pope RN   In general, would you say your health is: Very Good   The following questions are about activities you might do during a typical day. Does your health now limit you in these activities?  If so, how much?   Moderate activities, such as moving a table, pushing a vacuum , bowling, or playing golf? Yes, Limited A Little   Climbing several flights of stairs? Yes, Limited A Little   During the past 4 weeks, have you had any of the following problems with your work or other regular daily activities as a result of your physical health?   Accomplished less than you would like. Yes, Some Of The Time   Were limited in the kind of work or other activities. Yes, Some Of The Time   During the past 4 weeks, have you had any of the following problems with your work or other regular daily activities as a result of any emotional problems (such as feeling depressed or anxious)?   Accomplished less than you would like No, None Of The Time   Didn't do work or other activities as carefully as usual Yes, A Little Of The Time   During the past 4 weeks, how much did pain interfere with your normal work (including both work outside the home and house work)? Moderately   How much of the time during the past 4 weeks:   Have you felt calm and peaceful? All Of The Time   Did you have a lot of energy? Some Of The Time   Have you felt downhearted and blue? A Little Of The Time    During the past 4 weeks, how much of the time has your physical health or emotional problems interfered with your social activities (like visiting with friends, relatives, etc.)? None Of The Time   Compared to one year ago, how would you rate your physical health in general now? About The Same   Compared to one year ago, how would you rate your emotional problems (such as feeling anxious, depressed or irritable) now? About The Same   Ort Vr-12 Question Pcs (range: 0 - 100) 33.65   Ort Vr-12 Question McS (range: 0 - 100) 60.78           Imaging:    X-rays were personally reviewed today and show implants in good position with no evidence of complication. There have been no significant interval changes.    Impression and Plan:  This patient is now 15 days status post Left Total knee replacement.    The patient is doing well following Total knee replacement surgery.  Antibiotic prophylaxis prior to dental procedures were reviewed.  Long-term failure mechanisms were reviewed.  A new PT prescription was given to the patient and we reviewed exercises to be performed at home to work on improving range of motion and strength. The patient was asked to contact the office sooner if there are any concerns.    RTC: 6 weeks     X-rays at next visit: Postop TKA series    _____________________  Katie Ornelas MD   Attending Orthopaedic Surgeon  [facility]    [facility]    This office note was transcribed with dictation software.  Please excuse any typographical errors, program misunderstandings leading to inadvertent insertions or deletions of inappropriate wording, pronoun errors and other unintentional transcription errors not noticed on proof-reading.

## 2025-02-28 ENCOUNTER — APPOINTMENT (OUTPATIENT)
Dept: ORTHOPEDIC SURGERY | Facility: CLINIC | Age: 80
End: 2025-02-28
Payer: MEDICARE

## 2025-03-04 ENCOUNTER — EVALUATION (OUTPATIENT)
Dept: PHYSICAL THERAPY | Facility: CLINIC | Age: 80
End: 2025-03-04
Payer: MEDICARE

## 2025-03-04 DIAGNOSIS — M17.12 UNILATERAL PRIMARY OSTEOARTHRITIS, LEFT KNEE: ICD-10-CM

## 2025-03-04 DIAGNOSIS — M25.562 LEFT KNEE PAIN: Primary | ICD-10-CM

## 2025-03-04 PROCEDURE — 97110 THERAPEUTIC EXERCISES: CPT | Mod: GP | Performed by: PHYSICAL THERAPIST

## 2025-03-04 PROCEDURE — 97161 PT EVAL LOW COMPLEX 20 MIN: CPT | Mod: GP | Performed by: PHYSICAL THERAPIST

## 2025-03-04 ASSESSMENT — ENCOUNTER SYMPTOMS
LOSS OF SENSATION IN FEET: 0
OCCASIONAL FEELINGS OF UNSTEADINESS: 0
DEPRESSION: 0

## 2025-03-04 NOTE — PROGRESS NOTES
Physical Therapy    Physical Therapy Evaluation and Treatment    Patient Name: Kun Castle Jr  MRN: [id number]  Encounter Date: 3/4/2025     Time Calculation  Start Time: 0950  Stop Time: 1018  Time Calculation (min): 28 min    Current Problem:   1. Left knee pain  Follow Up In Physical Therapy      2. Unilateral primary osteoarthritis, left knee  Referral to Physical Therapy        Relevant Past Medical History: RA, diverticulosis, hyperlipidemia, hypertension, prostate hypertrophy, vitamin D deficiency, R inguinal hernia, nocturia, GERD, hiatal hernia, nephrolithiasis, orthostatic hypotension, sciatica   Surgical History: adenoidectomy, appendectomy, bilateral cataract extraction, cholecystectomy, colonoscopy, hernia repair, R TKA, L TKA, sinus surgery, L hand tendon repair, tonsillectomy  Medications: acetaminophen, aspirin, b complex, cholecalciferol, losartan, meloxicam, multivitamin, ondansetron, pantoprazole  Allergies: no known allergies    Precautions: s/p L TKA 2/11/25, s/p R TKA 3/12/24  STEADI Fall Risk: 1 (score of 4+ indicates fall risk)       SUBJECTIVE:   Pt underwent L TKA on 2/11/25 by Dr. Ornelas. Discharged home and participated in home health PT from 2/12/25 to 2/26/25. Reports ongoing performance of home health HEP. Most recently followed up with surgeon's office on 2/26/25 with the knee measuring 0-105 degrees. Has history of R TKA 3/12/24 for which he participated in PT at this clinic post-operatively, having achieved 0-124 degrees of R knee ROM.     Pain:   Current: 0/10  Lowest: 0/10  Highest: 2/10  Location: L knee  Onset: post-surgical  Description: ache  Aggravating Factors: bending  Relieving Factors: tylenol PRN, icing, elevating   Sleep Pattern: waking intermittently due to the pressure when lying on his side  Previous Interventions: home health PT       Red flags: denies numbness/tingling or saddle paresthesia, no changes to bowel or bladder    Occupation: retired 2005 - [employer]  [employer]   Hobbies: golf  Home Living: lives with wife, 1 entry stair, 2+13 with B handrail to second floor to bedroom/bathroom   Prior Level of Function: limited by L knee pain    Patient/Family Goal: \"able to walk\"    Outcome Measures: WOMAC 10/96    OBJECTIVE:    Observation/Posture:   Incision: scabbing distal > proximal, dime sized scab proximal lateral, (+) redness surrounding incision    Signs of infection: (-) drainage, warmth  Ecchymosis: (-)  Edema: (+)    Gait: independent, mild antalgia     Range of Motion:   KNEE ROM PROM    LEFT RIGHT   Flexion 110 125   Extension 0 0     Strength:  HIP MMT LEFT RIGHT   Flexion 4+/5 5/5     KNEE MMT LEFT RIGHT   Flexion 4+/5 5/5   Extension 4+/5 5/5     ANKLE MMT LEFT RIGHT   Dorsiflexion 5/5 5/5   Plantarflexion 5/5 5/5     Functional Strength and Mobility:  Active Straight leg raise: able with L, extensor lag develops with repetition  Bed Mobility: independent  Sit to stand:  modified independent with B UE support of arm rests  Stairs: nonreciprocal with UE support    Balance/Proprioception: impaired    Treatments:  Therapeutic Exercise: (15 minutes)  Review of home health HEP  Supine L quad sets x5  Supine L SLR x10 -- added to HEP  Supine L SAQ x20  Supine L calf stretch with strap 3x20\" -- added to HEP  Supine L heel slides with strap x10  -- added to HEP  Seated L LAQ x20    OP Education:   Initial evaluation completed, findings and plan of care discussed with patient. Patient education was provided regarding symptom management through activity modification, LE elevation, cold modalities. Patient performed and was instructed in an individualized HEP with handout issued as below.    Response to treatment: Patient demonstrate appropriate performance of interventions issued for HEP and verbalized good understanding of eduction provided. Denied exacerbation of symptoms post treatment.       HEP:  Access Code: [email or web address]  URL: [email or web address]  Date: " 03/04/2025  Prepared by: Archana Nunn    Exercises  - Supine Active Straight Leg Raise  - 3 x daily - 7 x weekly - 2-3 sets - 10 reps  - Supine Heel Slide with Strap  - 3 x daily - 7 x weekly - 10 reps - 5 hold  - Supine Calf Stretch with Strap  - 1 x daily - 7 x weekly - 3 sets - 20 hold  - Seated Calf Stretch with Strap  - 1 x daily - 7 x weekly - 3 sets - 20 hold    Patient Education  - Pain Management    ASSESSMENT:   Kun Castle Jr is a 79 y.o. male presenting 3 weeks s/p L TKA on 2/11/25 by Dr. Ornelas. Pain levels are well controlled with patient reporting compliance with post-operative instructions. Patient is currently ambulating independently without an assistive device with mild gait deviations. L knee mobility is reduced at 0-110 degrees PROM with moderate edema extending to the ankle. Incision is well healing with moderate scabbing and some redness. Quad recruitment and LE strength are impaired with patient able to perform a SLR but with lag developing with repetition. Pt currently limited in his standing, walking, sitting, and sleeping tolerance and presents with impairments with ambulation and stair negotiation. Patient would greatly benefit from skilled outpatient physical therapy services to address these impairments to facilitate symptom relief and improved level of function.    Complexity of Evaluation:   Based on the history including personal factors and/or comorbidities, examination of body systems including body structures and function, activity limitations, and/or participation restrictions, as well as clinical presentation, patient meets criteria for a LOW complexity evaluation.       PLAN:   OP PT PLAN:  Treatment/Interventions: Cryotherapy , Education/Instruction , Gait training , Manual Therapy  , Neuromuscular re-education , Self care/home management , Therapeutic activities , and Therapeutic exercise    PT Plan: Skilled PT   PT Frequency: 2 times per week   Duration: 5  weeks  Insurance: 2025: MEDICARE A/B - NO AUTH / $257 DED MET / [place] VISITS / $0 used 2025 pt/st.  2) AARP [facility] SUPP ACTIVE / ds 3/3/25.   Visits Approved: medical necessity  Certification Period Start Date: 3/4/25  Certification Period End Date: 6/2/25  Rehab Potential: Good  Plan of Care Agreement: Patient         Goals:   SHORT TERM GOALS  1) Pt will improve L knee ROM to 0-120+ degrees to facilitate improved functional mobility without limitation    2) Pt will demonstrate x30 L SLR without quad lag to reflect improvement in quadriceps muscular endurance and increased ease with bed mobility/car transfers  3) Pt will demonstrate painfree, symmetrical sit to stand to/from chair without UE support to improve transfers    LONG TERM GOALS  1) Pt will demonstrate independence with HEP for self management of condition  2) Pt will improve L hip flexion and knee flexion/extension strength to 5/5 for improved standing and walking tolerance  3) Pt will improve gait to independent without assistive device and normalized mechanics for improved ambulation  4) Pt will improve stair negotiation to reciprocal with unilateral UE support for improved home/community navigation

## 2025-03-07 DIAGNOSIS — I10 PRIMARY HYPERTENSION: ICD-10-CM

## 2025-03-11 ENCOUNTER — TREATMENT (OUTPATIENT)
Dept: PHYSICAL THERAPY | Facility: CLINIC | Age: 80
End: 2025-03-11
Payer: MEDICARE

## 2025-03-11 DIAGNOSIS — M25.562 LEFT KNEE PAIN: ICD-10-CM

## 2025-03-11 PROCEDURE — 97110 THERAPEUTIC EXERCISES: CPT | Mod: GP,CQ

## 2025-03-11 NOTE — PROGRESS NOTES
"Physical Therapy    Physical Therapy Treatment    Patient Name: Kun Castle Jr  MRN: [id number]  Encounter Date: 3/11/2025     Time Calculation  Start Time: 1020  Stop Time: 1110  Time Calculation (min): 50 min    Visit #: 2  out of 10  Insurance: 2025: MEDICARE A/B - NO AUTH / $257 DED MET / [place] / $0 used 2025 pt/st.  2) [facility] SUPP ACTIVE / ds 3/3/25.   Evaluation date: 3/4/25  DOS: 2/11/25    Current Problem:   1. Left knee pain  Follow Up In Physical Therapy        SUBJECTIVE:   Pt states to be doing OK overall, especially with ADL and relative functioning.  Notices most discomfort at night with bedtime, possibly secondary to stiffening.     Precautions:  s/p L TKA 2/11/25, s/p R TKA 3/12/24  STEADI Fall Risk: 1 (score of 4+ indicates fall risk)        Pain:   Start of session: 0/10       OBJECTIVE:    AROM L knee 0-118    Treatments:  Therapeutic Exercise: (50 minutes)   Nustep L3, S10 x10min  Rail support-high knee marching x20> on foam mat x25  Slanted stretch off mat x1-2 min  Leg press DL 65# x25 , 85# 2x25  -SL 45# x30  Sidestep on AIREX x2min  Sidestep vs yellow HC loop 4x8ft (x2)  Lat step up 4\" 2x15  Supine PROM and L knee jt mobs A/P at variable angles x5min     HEP:  Encouraged to mobilize and pursue optimal functioning -squats, marching, balance    ASSESSMENT:   Pt is doing very well with progression thru output , demonstrating good mobility and strength.  No apparent barriers occurring to note that should stall his progression.    Post session pain: feels good.     PLAN:  OP PT PLAN:  Treatment/Interventions: Cryotherapy , Education/Instruction , Gait training , Manual Therapy  , Neuromuscular re-education , Self care/home management , Therapeutic activities , and Therapeutic exercise    PT Plan: Skilled PT   PT Frequency: 2 times per week   Duration: 5 weeks  Insurance: 2025: MEDICARE A/B - NO AUTH / $257 DED MET / [place] / $0 used 2025 pt/st.  2) [facility] SUPP ACTIVE / ds 3/3/25. "   Visits Approved: medical necessity  Certification Period Start Date: 3/4/25  Certification Period End Date: 6/2/25  Rehab Potential: Good  Plan of Care Agreement: Patient         Goals:   SHORT TERM GOALS  1) Pt will improve L knee ROM to 0-120+ degrees to facilitate improved functional mobility without limitation  -PROGRESSING  2) Pt will demonstrate x30 L SLR without quad lag to reflect improvement in quadriceps muscular endurance and increased ease with bed mobility/car transfers -PROGRESSING  3) Pt will demonstrate painfree, symmetrical sit to stand to/from chair without UE support to improve transfers -PROGRESSING     LONG TERM GOALS  1) Pt will demonstrate independence with HEP for self management of condition -PROGRESSING  2) Pt will improve L hip flexion and knee flexion/extension strength to 5/5 for improved standing and walking tolerance -PROGRESSING  3) Pt will improve gait to independent without assistive device and normalized mechanics for improved ambulation -PROGRESSING  4) Pt will improve stair negotiation to reciprocal with unilateral UE support for improved home/community navigation -PROGRESSING

## 2025-03-13 ENCOUNTER — TREATMENT (OUTPATIENT)
Dept: PHYSICAL THERAPY | Facility: CLINIC | Age: 80
End: 2025-03-13
Payer: MEDICARE

## 2025-03-13 DIAGNOSIS — M25.562 LEFT KNEE PAIN: ICD-10-CM

## 2025-03-13 PROCEDURE — 97110 THERAPEUTIC EXERCISES: CPT | Mod: GP,CQ

## 2025-03-13 NOTE — PROGRESS NOTES
"Physical Therapy    Physical Therapy Treatment    Patient Name: Kun Castle Jr  MRN: [id number]  Encounter Date: 3/13/2025     Time Calculation  Start Time: 1015  Stop Time: 1100  Time Calculation (min): 45 min    Visit #: 3  out of 10  Insurance: 2025: MEDICARE A/B - NO AUTH / $257 DED MET / [place] / $0 used 2025 pt/st.  2) [facility] SUPP ACTIVE / ds 3/3/25.   Evaluation date: 3/4/25  DOS: 2/11/25    Current Problem:   1. Left knee pain  Follow Up In Physical Therapy        SUBJECTIVE:   Pt remarks to have had good reaction to last session.  No real soreness in DOMS effect noted.  Primary complaint remains at night while laying in bed with the ant knee.     Precautions:  s/p L TKA 2/11/25, s/p R TKA 3/12/24  STEADI Fall Risk: 1 (score of 4+ indicates fall risk)        Pain:   Start of session: 0/10       OBJECTIVE:    AROM L knee 0-118    Treatments:  Therapeutic Exercise: (45 minutes)  Recumbent L1>4 x10min  Slantboard 6x10s  High knee marching x150ft  4\" lateral step overs x15   6\" step ups x15   Leg press SL 60# x30 +20  -# 2x20  Sidestep vs yellow HC loop 5x8ft   Step taps to 6\" -sagittal and coronal 2x20     Supine PROM and L knee jt mobs A/P at variable angles x5min     HEP:  Encouraged to mobilize and pursue optimal functioning -squats, marching, balance    ASSESSMENT:   Pt able to magnify the strengthening of the L leg, most notably in relative output today.  He continues to demo good progression and nice status for the timeline he is at.    Post session pain: feels good.     PLAN:  OP PT PLAN:  Treatment/Interventions: Cryotherapy , Education/Instruction , Gait training , Manual Therapy  , Neuromuscular re-education , Self care/home management , Therapeutic activities , and Therapeutic exercise    PT Plan: Skilled PT   PT Frequency: 2 times per week   Duration: 5 weeks  Insurance: 2025: MEDICARE A/B - NO AUTH / $257 DED MET / [place] / $0 used 2025 pt/st.  2) [facility] SUPP ACTIVE / ds 3/3/25. "   Visits Approved: medical necessity  Certification Period Start Date: 3/4/25  Certification Period End Date: 6/2/25  Rehab Potential: Good  Plan of Care Agreement: Patient         Goals:   SHORT TERM GOALS  1) Pt will improve L knee ROM to 0-120+ degrees to facilitate improved functional mobility without limitation  -PROGRESSING  2) Pt will demonstrate x30 L SLR without quad lag to reflect improvement in quadriceps muscular endurance and increased ease with bed mobility/car transfers -PROGRESSING  3) Pt will demonstrate painfree, symmetrical sit to stand to/from chair without UE support to improve transfers -PROGRESSING     LONG TERM GOALS  1) Pt will demonstrate independence with HEP for self management of condition -PROGRESSING  2) Pt will improve L hip flexion and knee flexion/extension strength to 5/5 for improved standing and walking tolerance -PROGRESSING  3) Pt will improve gait to independent without assistive device and normalized mechanics for improved ambulation -PROGRESSING  4) Pt will improve stair negotiation to reciprocal with unilateral UE support for improved home/community navigation -PROGRESSING

## 2025-03-18 ENCOUNTER — TREATMENT (OUTPATIENT)
Dept: PHYSICAL THERAPY | Facility: CLINIC | Age: 80
End: 2025-03-18
Payer: MEDICARE

## 2025-03-18 DIAGNOSIS — M25.562 LEFT KNEE PAIN: ICD-10-CM

## 2025-03-18 PROCEDURE — 97110 THERAPEUTIC EXERCISES: CPT | Mod: GP,CQ

## 2025-03-18 NOTE — PROGRESS NOTES
"Physical Therapy    Physical Therapy Treatment    Patient Name: Kun Castle Jr  MRN: [id number]  Encounter Date: 3/18/2025     Time Calculation  Start Time: 1013  Stop Time: 1058  Time Calculation (min): 45 min    Visit #: 4  out of 10  Insurance: 2025: MEDICARE A/B - NO AUTH / $257 DED MET / [place] / $0 used 2025 pt/st.  2) [facility] SUPP ACTIVE / ds 3/3/25.   Evaluation date: 3/4/25  DOS: 2/11/25    Current Problem:   1. Left knee pain  Follow Up In Physical Therapy        SUBJECTIVE:   Pt states feeling sore after previous session.  No pain or discomfort noted as of  today's session.     Precautions:  s/p L TKA 2/11/25, s/p R TKA 3/12/24  STEADI Fall Risk: 1 (score of 4+ indicates fall risk)        Pain:   Start of session: 0/10       OBJECTIVE:    AROM L knee 0-118    Treatments:  Therapeutic Exercise: (45 minutes)  Recumbent bike L3 x 10min  Sidestep vs yellow HC loop mid tibial 3x 8ft (x2)  8\" step ups 2 x 10  Retro lunge x 10 ea.  Leg press SL 60# 2 x 20   -DL 80# x 20  -DL 90# x 20  SL L knee ext 10# 2 x 20  - SL R knee ext 10# x 20  DL knee curl 55# 2 x 20  B/L tandem stance pot stirs vs mini bungee 2 x 20 ea             HEP:  Encouraged to mobilize and pursue optimal functioning -squats, marching, balance    ASSESSMENT:   Pt tolerated treatment well and is able to use cues to focus on strengthening. Continues to progress well throughout treatment sessions.  He as evolved with some ability of L LE while still acknowledging the deficits relative to R LE.    Post session pain: feels good.     PLAN:  OP PT PLAN:  Treatment/Interventions: Cryotherapy , Education/Instruction , Gait training , Manual Therapy  , Neuromuscular re-education , Self care/home management , Therapeutic activities , and Therapeutic exercise    PT Plan: Skilled PT   PT Frequency: 2 times per week   Duration: 5 weeks  Insurance: 2025: MEDICARE A/B - NO AUTH / $257 DED MET / [place] / $0 used 2025 pt/st.  2) [facility] SUPP ACTIVE / ds " 3/3/25.   Visits Approved: medical necessity  Certification Period Start Date: 3/4/25  Certification Period End Date: 6/2/25  Rehab Potential: Good  Plan of Care Agreement: Patient         Goals:   SHORT TERM GOALS  1) Pt will improve L knee ROM to 0-120+ degrees to facilitate improved functional mobility without limitation  -PROGRESSING  2) Pt will demonstrate x30 L SLR without quad lag to reflect improvement in quadriceps muscular endurance and increased ease with bed mobility/car transfers -PROGRESSING  3) Pt will demonstrate painfree, symmetrical sit to stand to/from chair without UE support to improve transfers -PROGRESSING     LONG TERM GOALS  1) Pt will demonstrate independence with HEP for self management of condition -PROGRESSING  2) Pt will improve L hip flexion and knee flexion/extension strength to 5/5 for improved standing and walking tolerance -PROGRESSING  3) Pt will improve gait to independent without assistive device and normalized mechanics for improved ambulation -PROGRESSING  4) Pt will improve stair negotiation to reciprocal with unilateral UE support for improved home/community navigation -PROGRESSING

## 2025-03-20 ENCOUNTER — TREATMENT (OUTPATIENT)
Dept: PHYSICAL THERAPY | Facility: CLINIC | Age: 80
End: 2025-03-20
Payer: MEDICARE

## 2025-03-20 DIAGNOSIS — M25.562 LEFT KNEE PAIN: ICD-10-CM

## 2025-03-20 PROCEDURE — 97110 THERAPEUTIC EXERCISES: CPT | Mod: GP,CQ

## 2025-03-20 NOTE — PROGRESS NOTES
"Physical Therapy    Physical Therapy Treatment    Patient Name: Kun Castle Jr  MRN: [id number]  Encounter Date: 3/20/2025     Time Calculation  Start Time: 1010  Stop Time: 1055  Time Calculation (min): 45 min    Visit #: 5  out of 10  Insurance: 2025: MEDICARE A/B - NO AUTH / $257 DED MET / [place] / $0 used 2025 pt/st.  2) [facility] SUPP ACTIVE / ds 3/3/25.   Evaluation date: 3/4/25  DOS: 2/11/25    Current Problem:   1. Left knee pain  Follow Up In Physical Therapy        SUBJECTIVE:   Pt states he is going up the stairs better with his L knee. No description of pain or discomfort after last session leading into today's session.     Precautions:  s/p L TKA 2/11/25, s/p R TKA 3/12/24  STEADI Fall Risk: 1 (score of 4+ indicates fall risk)        Pain:   Start of session: 0/10       OBJECTIVE:    AROM L knee 0-118    Treatments:  Therapeutic Exercise: (45 minutes)  4\" ant step down w/ handrail assist 2x15   [employer]: -reverse walk x10  -Side walk x 10 ea  SL press 65# x20  - 70# x20  DL press 90# x20  - 120# x20  SL B/L knee ext 15# 2x15  DL knee curl 70# 2x15  A/P knee shift at variable angles x5min           HEP:  Encouraged to mobilize and pursue optimal functioning -squats, marching, balance    ASSESSMENT:   Pt seems to tolerate increased resistance with machine exercises well. Seated rest breaks required between exercises though he is demonstrating a solid endurance overall.    Charted by: Alli Branham, Student Physical Therapist Assistant    Post session pain: feels good.     PLAN:  OP PT PLAN:  Treatment/Interventions: Cryotherapy , Education/Instruction , Gait training , Manual Therapy  , Neuromuscular re-education , Self care/home management , Therapeutic activities , and Therapeutic exercise    PT Plan: Skilled PT   PT Frequency: 2 times per week   Duration: 5 weeks  Insurance: 2025: MEDICARE A/B - NO AUTH / $257 DED MET / [place] / $0 used 2025 pt/st.  2) [facility] SUPP ACTIVE / ds 3/3/25. "   Visits Approved: medical necessity  Certification Period Start Date: 3/4/25  Certification Period End Date: 6/2/25  Rehab Potential: Good  Plan of Care Agreement: Patient         Goals:   SHORT TERM GOALS  1) Pt will improve L knee ROM to 0-120+ degrees to facilitate improved functional mobility without limitation  -PROGRESSING  2) Pt will demonstrate x30 L SLR without quad lag to reflect improvement in quadriceps muscular endurance and increased ease with bed mobility/car transfers -PROGRESSING  3) Pt will demonstrate painfree, symmetrical sit to stand to/from chair without UE support to improve transfers -PROGRESSING     LONG TERM GOALS  1) Pt will demonstrate independence with HEP for self management of condition -PROGRESSING  2) Pt will improve L hip flexion and knee flexion/extension strength to 5/5 for improved standing and walking tolerance -PROGRESSING  3) Pt will improve gait to independent without assistive device and normalized mechanics for improved ambulation -PROGRESSING  4) Pt will improve stair negotiation to reciprocal with unilateral UE support for improved home/community navigation -PROGRESSING

## 2025-03-25 ENCOUNTER — CLINICAL SUPPORT (OUTPATIENT)
Dept: PHYSICAL THERAPY | Facility: CLINIC | Age: 80
End: 2025-03-25
Payer: MEDICARE

## 2025-03-25 DIAGNOSIS — M25.562 LEFT KNEE PAIN: ICD-10-CM

## 2025-03-25 PROCEDURE — 97110 THERAPEUTIC EXERCISES: CPT | Mod: GP,CQ

## 2025-03-25 NOTE — PROGRESS NOTES
Katie Ornelas MD   Adult Reconstruction and Joint Replacement Surgery  Phone: [phone]     Fax: [phone]       Name: Kun Castle Jr  Age: 79 y.o.   : 1945   Date of Visit: 3/26/2025    FOLLOW UP    Procedure: Left Total knee replacement  Date: 2025    Chief Complaint: Left Total knee replacement surgery follow-up    History of Present Illness:  The patient is now 6 weeks 1 days out from left Total knee replacement surgery.     The patient has no pain.    Currently taking tylenol or nothing for pain.     The patient has low stiffness.     Patient is walking with nothing for assistive device.    The patient goes up and down stairs with single leg negotiation .    Patient can walk 6 blocks.    The patient is doing outpatient physical therapy.    No fevers or drainage from the incision. Small stitch abscess <1 mm in central portion of incision removed.     There are no concerns.    The patient is mildly limited. Wants to return to golf.     Physical Exam:  The patient is well appearing, alert and oriented to person place and time.    The incision is well healed. There is no sign of wound complication.    Range of Motion: full extension to 140 degrees of flexion.    There is a 2 degree extensor lag.    There is a moderate effusion in the knee.    There is no instability. The knee is stable to varus-valgus stress and anterior-posterior stress.     Homans sign is negative.    Neurologic, and vascular examinations are normal.    PROMs  Koos Jr-Knee Injury And Osteoarthritis Outcome Score For Joint Replacement    2024  4:53 PM EST - Filed by Patient   Instructions    The following question concerns the amount of joint stiffness you have experienced during the last week in your knee. Stiffness is a sensation of restriction or slowness in the ease with which you move your knee joint.   How severe is your knee stiffness after first wakening in the morning? Mild   What amount of knee pain  have you experienced the last week during the following activities?   Twisting/pivoting on your knee None   Straightening knee fully None   Going up or down stairs None   Standing upright None   The following questions concern your physical function. By this we mean your ability to move around and to look after yourself. For each of the following activities please indicate the degree of difficulty you have experienced in the last week due to your knee.   Rising from sitting Mild   Bending to floor/ an object Mild   Koos Jr Scoring (range: 0 - 100) 79.91     Ort [facility] 12 Item Health Survey (Vr-12)    2/28/2024  9:17 AM EST - Filed by Shanita Pope RN   In general, would you say your health is: Very Good   The following questions are about activities you might do during a typical day. Does your health now limit you in these activities?  If so, how much?   Moderate activities, such as moving a table, pushing a vacuum , bowling, or playing golf? Yes, Limited A Little   Climbing several flights of stairs? Yes, Limited A Little   During the past 4 weeks, have you had any of the following problems with your work or other regular daily activities as a result of your physical health?   Accomplished less than you would like. Yes, Some Of The Time   Were limited in the kind of work or other activities. Yes, Some Of The Time   During the past 4 weeks, have you had any of the following problems with your work or other regular daily activities as a result of any emotional problems (such as feeling depressed or anxious)?   Accomplished less than you would like No, None Of The Time   Didn't do work or other activities as carefully as usual Yes, A Little Of The Time   During the past 4 weeks, how much did pain interfere with your normal work (including both work outside the home and house work)? Moderately   How much of the time during the past 4 weeks:   Have you felt calm and peaceful? All Of The Time   Did  you have a lot of energy? Some Of The Time   Have you felt downhearted and blue? A Little Of The Time   During the past 4 weeks, how much of the time has your physical health or emotional problems interfered with your social activities (like visiting with friends, relatives, etc.)? None Of The Time   Compared to one year ago, how would you rate your physical health in general now? About The Same   Compared to one year ago, how would you rate your emotional problems (such as feeling anxious, depressed or irritable) now? About The Same   Ort Vr-12 Question Pcs (range: 0 - 100) 33.65   Ort Vr-12 Question McS (range: 0 - 100) 60.78           Imaging:    X-rays were personally reviewed today and show implants in good position with no evidence of complication.    Impression and Plan:    This patient is 6 weeks 1 days from left Total knee replacement. 7 day course of  duricef given small area of redness (<2 mm) around small central stitch abscess site.     The patient is doing well following Total knee replacement surgery.  Antibiotic prophylaxis prior to dental procedures were reviewed.  Long term failure mechanisms were reviewed. Discussed importance of long term follow up. The patient was asked to contact the office sooner if there are any concerns. Their questions were answered.     RTC: 3 months     X-rays at next visit: Postop TKA series    _____________________  Katie Ornelas MD   Attending Orthopaedic Surgeon  [facility]    [facility]    This office note was transcribed with dictation software.  Please excuse any typographical errors, program misunderstandings leading to inadvertent insertions or deletions of inappropriate wording, pronoun errors and other unintentional transcription errors not noticed on proof-reading.

## 2025-03-26 ENCOUNTER — HOSPITAL ENCOUNTER (OUTPATIENT)
Dept: RADIOLOGY | Facility: HOSPITAL | Age: 80
Discharge: HOME | End: 2025-03-26
Payer: MEDICARE

## 2025-03-26 ENCOUNTER — APPOINTMENT (OUTPATIENT)
Dept: ORTHOPEDIC SURGERY | Facility: HOSPITAL | Age: 80
End: 2025-03-26
Payer: MEDICARE

## 2025-03-26 VITALS — WEIGHT: 189 LBS | BODY MASS INDEX: 27.06 KG/M2 | HEIGHT: 70 IN

## 2025-03-26 DIAGNOSIS — Z96.652 STATUS POST LEFT KNEE REPLACEMENT: ICD-10-CM

## 2025-03-26 DIAGNOSIS — Z96.652 STATUS POST LEFT KNEE REPLACEMENT: Primary | ICD-10-CM

## 2025-03-26 PROCEDURE — 73562 X-RAY EXAM OF KNEE 3: CPT | Mod: LT

## 2025-03-26 PROCEDURE — 1036F TOBACCO NON-USER: CPT | Performed by: STUDENT IN AN ORGANIZED HEALTH CARE EDUCATION/TRAINING PROGRAM

## 2025-03-26 PROCEDURE — 1159F MED LIST DOCD IN RCRD: CPT | Performed by: STUDENT IN AN ORGANIZED HEALTH CARE EDUCATION/TRAINING PROGRAM

## 2025-03-26 PROCEDURE — 99211 OFF/OP EST MAY X REQ PHY/QHP: CPT | Performed by: STUDENT IN AN ORGANIZED HEALTH CARE EDUCATION/TRAINING PROGRAM

## 2025-03-26 PROCEDURE — 1123F ACP DISCUSS/DSCN MKR DOCD: CPT | Performed by: STUDENT IN AN ORGANIZED HEALTH CARE EDUCATION/TRAINING PROGRAM

## 2025-03-26 RX ORDER — CEFADROXIL 500 MG/1
500 CAPSULE ORAL 2 TIMES DAILY
Qty: 14 CAPSULE | Refills: 0 | Status: SHIPPED | OUTPATIENT
Start: 2025-03-26 | End: 2025-04-02

## 2025-03-26 ASSESSMENT — PAIN - FUNCTIONAL ASSESSMENT: PAIN_FUNCTIONAL_ASSESSMENT: NO/DENIES PAIN

## 2025-03-26 NOTE — LETTER
2025     Hayden Bro MD  [address] [facility] [address]    Patient: Kun Castle Jr   YOB: 1945   Date of Visit: 3/26/2025       Dear Dr. Hayden Bro MD:    Thank you for referring Kun Castle Jr to me for evaluation. Below are my notes for this consultation.  If you have questions, please do not hesitate to call me. I look forward to following your patient along with you.       Sincerely,     Katie Ornelas MD      CC: No Recipients  ______________________________________________________________________________________     Katie Ornelas MD   Adult Reconstruction and Joint Replacement Surgery  Phone: [phone]     Fax: [phone]       Name: Kun Castle Jr  Age: 79 y.o.   : 1945   Date of Visit: 3/26/2025    FOLLOW UP    Procedure: Left Total knee replacement  Date: 2025    Chief Complaint: Left Total knee replacement surgery follow-up    History of Present Illness:  The patient is now 6 weeks 1 days out from left Total knee replacement surgery.     The patient has no pain.    Currently taking tylenol or nothing for pain.     The patient has low stiffness.     Patient is walking with nothing for assistive device.    The patient goes up and down stairs with single leg negotiation .    Patient can walk 6 blocks.    The patient is doing outpatient physical therapy.    No fevers or drainage from the incision. Small stitch abscess <1 mm in central portion of incision removed.     There are no concerns.    The patient is mildly limited. Wants to return to golf.     Physical Exam:  The patient is well appearing, alert and oriented to person place and time.    The incision is well healed. There is no sign of wound complication.    Range of Motion: full extension to 140 degrees of flexion.    There is a 2 degree extensor lag.    There is a moderate effusion in the knee.    There is no instability. The knee is stable to varus-valgus stress and  anterior-posterior stress.     Homans sign is negative.    Neurologic, and vascular examinations are normal.    PROMs  Koos Jr-Knee Injury And Osteoarthritis Outcome Score For Joint Replacement    12/12/2024  4:53 PM EST - Filed by Patient   Instructions    The following question concerns the amount of joint stiffness you have experienced during the last week in your knee. Stiffness is a sensation of restriction or slowness in the ease with which you move your knee joint.   How severe is your knee stiffness after first wakening in the morning? Mild   What amount of knee pain have you experienced the last week during the following activities?   Twisting/pivoting on your knee None   Straightening knee fully None   Going up or down stairs None   Standing upright None   The following questions concern your physical function. By this we mean your ability to move around and to look after yourself. For each of the following activities please indicate the degree of difficulty you have experienced in the last week due to your knee.   Rising from sitting Mild   Bending to floor/ an object Mild   Koos Jr Scoring (range: 0 - 100) 79.91     [facility] [place] 12 Item Health Survey (Vr-12)    2/28/2024  9:17 AM EST - Filed by Shanita Pope RN   In general, would you say your health is: Very Good   The following questions are about activities you might do during a typical day. Does your health now limit you in these activities?  If so, how much?   Moderate activities, such as moving a table, pushing a vacuum , bowling, or playing golf? Yes, Limited A Little   Climbing several flights of stairs? Yes, Limited A Little   During the past 4 weeks, have you had any of the following problems with your work or other regular daily activities as a result of your physical health?   Accomplished less than you would like. Yes, Some Of The Time   Were limited in the kind of work or other activities. Yes, Some Of The Time   During  the past 4 weeks, have you had any of the following problems with your work or other regular daily activities as a result of any emotional problems (such as feeling depressed or anxious)?   Accomplished less than you would like No, None Of The Time   Didn't do work or other activities as carefully as usual Yes, A Little Of The Time   During the past 4 weeks, how much did pain interfere with your normal work (including both work outside the home and house work)? Moderately   How much of the time during the past 4 weeks:   Have you felt calm and peaceful? All Of The Time   Did you have a lot of energy? Some Of The Time   Have you felt downhearted and blue? A Little Of The Time   During the past 4 weeks, how much of the time has your physical health or emotional problems interfered with your social activities (like visiting with friends, relatives, etc.)? None Of The Time   Compared to one year ago, how would you rate your physical health in general now? About The Same   Compared to one year ago, how would you rate your emotional problems (such as feeling anxious, depressed or irritable) now? About The Same   Ort Vr-12 Question Pcs (range: 0 - 100) 33.65   Ort Vr-12 Question McS (range: 0 - 100) 60.78           Imaging:    X-rays were personally reviewed today and show implants in good position with no evidence of complication.    Impression and Plan:    This patient is 6 weeks 1 days from left Total knee replacement. 7 day course of  duricef given small area of redness (<2 mm) around small central stitch abscess site.     The patient is doing well following Total knee replacement surgery.  Antibiotic prophylaxis prior to dental procedures were reviewed.  Long term failure mechanisms were reviewed. Discussed importance of long term follow up. The patient was asked to contact the office sooner if there are any concerns. Their questions were answered.     RTC: 3 months     X-rays at next visit: Postop TKA  series    _____________________  Katie Ornelas MD   Attending Orthopaedic Surgeon  [facility]    [facility]    This office note was transcribed with dictation software.  Please excuse any typographical errors, program misunderstandings leading to inadvertent insertions or deletions of inappropriate wording, pronoun errors and other unintentional transcription errors not noticed on proof-reading.

## 2025-03-27 ENCOUNTER — TREATMENT (OUTPATIENT)
Dept: PHYSICAL THERAPY | Facility: CLINIC | Age: 80
End: 2025-03-27
Payer: MEDICARE

## 2025-03-27 DIAGNOSIS — M25.562 LEFT KNEE PAIN: ICD-10-CM

## 2025-03-27 PROCEDURE — 97110 THERAPEUTIC EXERCISES: CPT | Mod: GP,CQ

## 2025-03-27 NOTE — PROGRESS NOTES
"Physical Therapy    Physical Therapy Treatment    Patient Name: Kun Castle Jr  MRN: [id number]  Encounter Date: 3/27/2025     Time Calculation  Start Time: 1005  Stop Time: 1055  Time Calculation (min): 50 min    Visit #: 7  out of 10  Insurance: 2025: MEDICARE A/B - NO AUTH / $257 DED MET / [place] / $0 used 2025 pt/st.  2) AARP MC SUPP ACTIVE / ds 3/3/25.   Evaluation date: 3/4/25  DOS: 2/11/25    Current Problem:   1. Left knee pain  Follow Up In Physical Therapy          SUBJECTIVE:   Pt states that some of the wound suture is not dissolving upon the surgical scar.  The doctor was working to excise potential suture to help expedite the healing of the scar. Otherwise, no changes in status noted.     Precautions:  s/p L TKA 2/11/25, s/p R TKA 3/12/24  STEADI Fall Risk: 1 (score of 4+ indicates fall risk)        Pain:   Start of session: 0/10       OBJECTIVE:    AROM L knee 0-118, pt arrives with a minor limp this date upon arrival.     Treatments:  Therapeutic Exercise: (45 minutes)  Nustep L5 x 10 min  4\" ant step down w/ handrail 2 x 15  6\" posterior step down w/ handrail 2 x 15  Sportcord (1 cord):  side walk x 10 ea  SL press 70# 2 x 20  DL press 120# 2 x 20  SL B/L knee ext 15# 2 x 20  DL knee curl 70# 2 x 20  SL heel raise 45# 2 x 20 ea         HEP:  Encouraged to mobilize and pursue optimal functioning -squats, marching, balance    ASSESSMENT:   Pt states throughout session that he feels exercises in targeted muscles, and shows signs of slight fatigue. Min rest breaks required, otherwise tolerated treatment well. Pt reports no adverse signs or symptoms after treatment session. Showing great signs of progression throughout each treatment session.    Charted by: Alli Branham, Student Physical Therapist Assistant    Post session pain: No pain noted today.     PLAN:  OP PT PLAN:  Treatment/Interventions: Cryotherapy , Education/Instruction , Gait training , Manual Therapy  , Neuromuscular " re-education , Self care/home management , Therapeutic activities , and Therapeutic exercise    PT Plan: Skilled PT   PT Frequency: 2 times per week   Duration: 5 weeks  Insurance: 2025: MEDICARE A/B - NO AUTH / $257 DED MET / [place]IS[place] / $0 used 2025 pt/st.  2) AARP MC SUPP ACTIVE / ds 3/3/25.   Visits Approved: medical necessity  Certification Period Start Date: 3/4/25  Certification Period End Date: 6/2/25  Rehab Potential: Good  Plan of Care Agreement: Patient         Goals:   SHORT TERM GOALS  1) Pt will improve L knee ROM to 0-120+ degrees to facilitate improved functional mobility without limitation  -PROGRESSING  2) Pt will demonstrate x30 L SLR without quad lag to reflect improvement in quadriceps muscular endurance and increased ease with bed mobility/car transfers -PROGRESSING  3) Pt will demonstrate painfree, symmetrical sit to stand to/from chair without UE support to improve transfers -PROGRESSING     LONG TERM GOALS  1) Pt will demonstrate independence with HEP for self management of condition -PROGRESSING  2) Pt will improve L hip flexion and knee flexion/extension strength to 5/5 for improved standing and walking tolerance -PROGRESSING  3) Pt will improve gait to independent without assistive device and normalized mechanics for improved ambulation -PROGRESSING  4) Pt will improve stair negotiation to reciprocal with unilateral UE support for improved home/community navigation -PROGRESSING

## 2025-04-01 ENCOUNTER — TREATMENT (OUTPATIENT)
Dept: PHYSICAL THERAPY | Facility: CLINIC | Age: 80
End: 2025-04-01
Payer: MEDICARE

## 2025-04-01 DIAGNOSIS — M25.562 LEFT KNEE PAIN: Primary | ICD-10-CM

## 2025-04-01 PROCEDURE — 97110 THERAPEUTIC EXERCISES: CPT | Mod: GP | Performed by: PHYSICAL THERAPIST

## 2025-04-01 NOTE — PROGRESS NOTES
"Physical Therapy    Physical Therapy Treatment    Patient Name: Kun Castle Jr  MRN: [id number]  Encounter Date: 4/1/2025     Time Calculation  Start Time: 1116  Stop Time: 1156  Time Calculation (min): 40 min    Visit #: 8  out of 10  Insurance: 2025: MEDICARE A/B - NO AUTH / $257 DED MET / [place] / $0 used 2025 pt/st.  2) AARP MC SUPP ACTIVE / ds 3/3/25.   Evaluation date: 3/4/25  DOS: 2/11/25    Current Problem:   1. Left knee pain  Follow Up In Physical Therapy        SUBJECTIVE:   Pt notes that his incision is not as red as it was. Denies pain other than the associated stiffness. Sleeping through the night without waking due to pain. Has been able to negotiate stairs reciprocally with UE support of rails for balance rather than the needed support.      Precautions:  s/p L TKA 2/11/25, s/p R TKA 3/12/24  STEADI Fall Risk: 1 (score of 4+ indicates fall risk)        Pain:   Start of session: 0/10       OBJECTIVE:    Observation/Posture:   Incision: bandaid covering scabbing, mild redness around incision centrally  Signs of infection: (-) drainage, warmth  Ecchymosis: (-)  Edema: (+)     Gait: independent, mild antalgia      Range of Motion:   KNEE ROM PROM    LEFT   Flexion 124   Extension 0      Strength:  HIP MMT LEFT   Flexion 5/5     KNEE MMT LEFT   Flexion 5/5   Extension 5/5     ANKLE MMT LEFT   Dorsiflexion 5/5   Plantarflexion 5/5      Functional Strength and Mobility:  Active Straight leg raise: x30 without extensor lag  Bed Mobility: independent  Sit to stand:  independent without UE support  Stairs: reciprocal with UE support     Balance/Proprioception: L SLS 5+     Treatments:  Therapeutic Exercise: (15 minutes)  Recumbent bike L3 x10 minutes  Reassessment (see objective)  L SLR x30  Sit to stand to/from low mat without UE support 2x10  Standing 12\" forward lunging for knee flexion  6\" forward step up L x10 with unilateral UE support  6\" lateral step up L x10 with unilateral UE support  6\" " forward step downs L x10 with unilateral UE support  Review of calf stretch: standing unilateral vs B heel drop off step  Sport cord (black, handle) retro walking x10  DL press 120# 2 x 20  SL press 70# 2 x 20  DL knee curl 70# 2 x 20  SL knee ext 15# 2 x 20    HEP:  SLR  Sit to stands with progression to box squat  L knee flexion stretch on step  Forward step ups  Lateral step ups  Step downs  Standing calf stretch    ASSESSMENT:   Pt has made excellent progress with PT. L knee ROM 0-124 degrees. Gross L knee strength 5/5. Sit to stand independent and symmetrical without UE support. Negotiating stairs reciprocally with UE support; some ongoing eccentric quad strength impairment. Updated HEP this date for pt to perform continued strengthening at home in the absence of weight equipment.    Post session pain: denies    PLAN:  Pt has one additional appointment scheduled; plan to maintain for pt to participate in one additional strengthening session with review of HEP and anticipated DC to independent management    OP PT PLAN:  Treatment/Interventions: Cryotherapy , Education/Instruction , Gait training , Manual Therapy  , Neuromuscular re-education , Self care/home management , Therapeutic activities , and Therapeutic exercise    PT Plan: Skilled PT   PT Frequency: 2 times per week   Duration: 5 weeks  Insurance: 2025: MEDICARE A/B - NO AUTH / $257 DED MET / [place] / $0 used 2025 pt/st.  2) DANNY  SUPP ACTIVE / ds 3/3/25.   Visits Approved: medical necessity  Certification Period Start Date: 3/4/25  Certification Period End Date: 6/2/25  Rehab Potential: Good  Plan of Care Agreement: Patient         Goals:   SHORT TERM GOALS  1) Pt will improve L knee ROM to 0-120+ degrees to facilitate improved functional mobility without limitation  -MET  2) Pt will demonstrate x30 L SLR without quad lag to reflect improvement in quadriceps muscular endurance and increased ease with bed mobility/car transfers -MET  3) Pt will  demonstrate painfree, symmetrical sit to stand to/from chair without UE support to improve transfers -MET     LONG TERM GOALS  1) Pt will demonstrate independence with HEP for self management of condition -PROGRESSING  2) Pt will improve L hip flexion and knee flexion/extension strength to 5/5 for improved standing and walking tolerance -MET  3) Pt will improve gait to independent without assistive device and normalized mechanics for improved ambulation -PARTIALLY MET, independent with mild deviation   4) Pt will improve stair negotiation to reciprocal with unilateral UE support for improved home/community navigation -MET

## 2025-04-02 RX ORDER — LOSARTAN POTASSIUM 50 MG/1
50 TABLET ORAL DAILY
Qty: 90 TABLET | Refills: 3 | Status: SHIPPED | OUTPATIENT
Start: 2025-04-02 | End: 2026-04-02

## 2025-04-08 ENCOUNTER — TREATMENT (OUTPATIENT)
Dept: PHYSICAL THERAPY | Facility: CLINIC | Age: 80
End: 2025-04-08
Payer: MEDICARE

## 2025-04-08 DIAGNOSIS — M25.562 LEFT KNEE PAIN: ICD-10-CM

## 2025-04-08 PROCEDURE — 97110 THERAPEUTIC EXERCISES: CPT | Mod: GP,CQ

## 2025-04-08 NOTE — PROGRESS NOTES
"Physical Therapy    Physical Therapy Treatment / Discharge Summary    Patient Name: Kun Castle Jr  MRN: [id number]  Encounter Date: 4/8/2025     Time Calculation  Start Time: 1015  Stop Time: 1055  Time Calculation (min): 40 min    Visit #: 9  out of 10  Insurance: 2025: MEDICARE A/B - NO AUTH / $257 DED MET / [place] / $0 used 2025 pt/st.  2) AARP MC SUPP ACTIVE / ds 3/3/25.   Evaluation date: 3/4/25  DOS: 2/11/25    Current Problem:   1. Left knee pain  Follow Up In Physical Therapy        SUBJECTIVE:   Pt feels good this morning, familiar morning body stiffness.  Denies muscle soreness after last PT session. Pt frequently going up and down stairs at home , reciprocal with hand rail for comfort.  Performs HEP daily      Precautions:  s/p L TKA 2/11/25, s/p R TKA 3/12/24  STEADI Fall Risk: 1 (score of 4+ indicates fall risk)        Pain:   Start of session: 0/10       OBJECTIVE:    4/1/25  Observation/Posture:   Incision: bandaid covering scabbing, mild redness around incision centrally  Signs of infection: (-) drainage, warmth  Ecchymosis: (-)  Edema: (+)     Gait: independent, mild antalgia      Range of Motion:   KNEE ROM PROM    LEFT   Flexion 124   Extension 0      Strength:  HIP MMT LEFT   Flexion 5/5     KNEE MMT LEFT   Flexion 5/5   Extension 5/5     ANKLE MMT LEFT   Dorsiflexion 5/5   Plantarflexion 5/5      Functional Strength and Mobility:  Active Straight leg raise: x30 without extensor lag  Bed Mobility: independent  Sit to stand:  independent without UE support  Stairs: reciprocal with UE support     Balance/Proprioception: L SLS 5+     Treatments:  Therapeutic Exercise: (40 minutes)  Recumbent bike L3 x10 minutes  DL press 120# 2 x 20  SL press 70# 2 x 20  DL knee curl 70# 2 x 20  SL 20# 2 x 15  SL knee ext 15# 2 x 20  6\" forward step up L x10 with unilateral UE support  6\" lateral step up L x10 with unilateral UE support  Knee flexion stretch at 6 inch step x 3  Review of  - calf stretch: " standing unilateral runner stretch   -supine hamstring stretch with strap  -supine knee flexion with strap       HEP:  SLR  Sit to stands with progression to box squat  L knee flexion stretch on step  Forward step ups  Lateral step ups  Step downs  Standing calf stretch    ASSESSMENT:   8 weeks post op. Pt participated in continued strengthening program this session without incident . Review of current HEP that was updated last session. Pt able to demonstrate correct performance of current program and will continue at this time at home for self management of continued LE strengthening. He has met all rehab goals with the exception of continuation of mild gait deviations. Pt to continue with independent HEP at this time.    Post session pain: denies    PLAN:  Discharge to home exercise program     OP PT PLAN:  Treatment/Interventions: Cryotherapy , Education/Instruction , Gait training , Manual Therapy  , Neuromuscular re-education , Self care/home management , Therapeutic activities , and Therapeutic exercise    PT Plan: Skilled PT   PT Frequency: 2 times per week   Duration: 5 weeks  Insurance: 2025: MEDICARE A/B - NO AUTH / $257 DED MET / [place]IS[place] / $0 used 2025 pt/st.  2) AARP  SUPP ACTIVE / ds 3/3/25.   Visits Approved: medical necessity  Certification Period Start Date: 3/4/25  Certification Period End Date: 6/2/25  Rehab Potential: Good  Plan of Care Agreement: Patient         Goals:   SHORT TERM GOALS  1) Pt will improve L knee ROM to 0-120+ degrees to facilitate improved functional mobility without limitation  -MET  2) Pt will demonstrate x30 L SLR without quad lag to reflect improvement in quadriceps muscular endurance and increased ease with bed mobility/car transfers -MET  3) Pt will demonstrate painfree, symmetrical sit to stand to/from chair without UE support to improve transfers -MET     LONG TERM GOALS  1) Pt will demonstrate independence with HEP for self management of condition achieved   2) Pt  will improve L hip flexion and knee flexion/extension strength to 5/5 for improved standing and walking tolerance -MET  3) Pt will improve gait to independent without assistive device and normalized mechanics for improved ambulation -PARTIALLY MET, independent with mild deviation   4) Pt will improve stair negotiation to reciprocal with unilateral UE support for improved home/community navigation -MET

## 2025-04-16 ENCOUNTER — APPOINTMENT (OUTPATIENT)
Dept: PRIMARY CARE | Facility: CLINIC | Age: 80
End: 2025-04-16
Payer: MEDICARE

## 2025-04-16 VITALS
HEIGHT: 70 IN | DIASTOLIC BLOOD PRESSURE: 82 MMHG | SYSTOLIC BLOOD PRESSURE: 118 MMHG | TEMPERATURE: 97.7 F | BODY MASS INDEX: 26.48 KG/M2 | HEART RATE: 74 BPM | RESPIRATION RATE: 14 BRPM | WEIGHT: 185 LBS

## 2025-04-16 DIAGNOSIS — E78.2 MODERATE MIXED HYPERLIPIDEMIA NOT REQUIRING STATIN THERAPY: Primary | ICD-10-CM

## 2025-04-16 DIAGNOSIS — M05.741 RHEUMATOID ARTHRITIS INVOLVING BOTH HANDS WITH POSITIVE RHEUMATOID FACTOR (MULTI): ICD-10-CM

## 2025-04-16 DIAGNOSIS — K21.9 GASTROESOPHAGEAL REFLUX DISEASE WITHOUT ESOPHAGITIS: ICD-10-CM

## 2025-04-16 DIAGNOSIS — E55.9 VITAMIN D DEFICIENCY: ICD-10-CM

## 2025-04-16 DIAGNOSIS — M06.9 RHEUMATOID ARTHRITIS, INVOLVING UNSPECIFIED SITE, UNSPECIFIED WHETHER RHEUMATOID FACTOR PRESENT (MULTI): ICD-10-CM

## 2025-04-16 DIAGNOSIS — M05.742 RHEUMATOID ARTHRITIS INVOLVING BOTH HANDS WITH POSITIVE RHEUMATOID FACTOR (MULTI): ICD-10-CM

## 2025-04-16 DIAGNOSIS — M17.12 UNILATERAL PRIMARY OSTEOARTHRITIS, LEFT KNEE: ICD-10-CM

## 2025-04-16 DIAGNOSIS — I10 PRIMARY HYPERTENSION: ICD-10-CM

## 2025-04-16 LAB
25(OH)D3 SERPL-MCNC: 45 NG/ML (ref 30–100)
ALT SERPL W P-5'-P-CCNC: 20 U/L (ref 14–59)
ANION GAP SERPL CALC-SCNC: 10 MMOL/L (ref 10–20)
AST SERPL W P-5'-P-CCNC: 17 U/L (ref 15–37)
BASOPHILS # BLD AUTO: 0.01 X10*3/UL (ref 0.1–1.6)
BASOPHILS NFR BLD AUTO: 0.23 % (ref 0–0.3)
BUN SERPL-MCNC: 28 MG/DL (ref 7–18)
CALCIUM SERPL-MCNC: 8.7 MG/DL (ref 8.5–10.1)
CHLORIDE SERPL-SCNC: 104 MMOL/L (ref 98–107)
CHOLEST SERPL-MCNC: 162 MG/DL (ref 0–199)
CHOLESTEROL/HDL RATIO: 2.7 (ref 4.2–7)
CO2 SERPL-SCNC: 30 MMOL/L (ref 21–32)
CREAT SERPL-MCNC: 1.02 MG/DL (ref 0.6–1.1)
EGFRCR SERPLBLD CKD-EPI 2021: 74 ML/MIN/1.73M*2
EOSINOPHIL # BLD AUTO: 0.18 X10*3/UL (ref 0.04–0.5)
EOSINOPHIL NFR BLD AUTO: 3.19 % (ref 0.7–7)
ERYTHROCYTE [DISTWIDTH] IN BLOOD BY AUTOMATED COUNT: 13.4 % (ref 11.5–14.5)
GLUCOSE SERPL-MCNC: 89 MG/DL (ref 74–100)
HCT VFR BLD AUTO: 37.9 % (ref 38.4–51.3)
HDLC SERPL-MCNC: 60 MG/DL (ref 40–59)
HGB BLD-MCNC: 12.9 G/DL (ref 12.7–17)
IS PATIENT FASTING: YES
LDLC SERPL DIRECT ASSAY-MCNC: 91 MG/DL (ref 0–100)
LYMPHOCYTES # BLD AUTO: 1.51 X10*3/UL (ref 0–6)
LYMPHOCYTES NFR BLD AUTO: 27.07 % (ref 20.5–51.1)
MCH RBC QN AUTO: 32.7 PG (ref 26–32)
MCHC RBC AUTO-ENTMCNC: 34 G/DL (ref 31–38)
MCV RBC AUTO: 96.3 FL (ref 80–96)
MONOCYTES # BLD AUTO: 0.43 X10*3/UL (ref 1.6–24.9)
MONOCYTES NFR BLD AUTO: 7.74 % (ref 1.7–9.3)
NEUTROPHILS # BLD AUTO: 3.43 X10*3/UL (ref 1.4–6.5)
NEUTROPHILS NFR BLD AUTO: 61.77 % (ref 42.2–75.2)
PLATELET # BLD AUTO: 227.2 X10*3/UL (ref 150–450)
PMV BLD AUTO: 7.43 FL (ref 7.8–11)
POTASSIUM SERPL-SCNC: 4.6 MMOL/L (ref 3.5–5.1)
RBC # BLD AUTO: 3.94 X10*6/UL (ref 4.1–5.6)
SODIUM SERPL-SCNC: 139 MMOL/L (ref 136–145)
TRIGL SERPL-MCNC: 37 MG/DL
WBC # BLD AUTO: 5.56 X10*3/UL (ref 4.5–10.5)

## 2025-04-16 PROCEDURE — 1159F MED LIST DOCD IN RCRD: CPT | Performed by: INTERNAL MEDICINE

## 2025-04-16 PROCEDURE — 3074F SYST BP LT 130 MM HG: CPT | Performed by: INTERNAL MEDICINE

## 2025-04-16 PROCEDURE — 3079F DIAST BP 80-89 MM HG: CPT | Performed by: INTERNAL MEDICINE

## 2025-04-16 PROCEDURE — 1123F ACP DISCUSS/DSCN MKR DOCD: CPT | Performed by: INTERNAL MEDICINE

## 2025-04-16 PROCEDURE — 99214 OFFICE O/P EST MOD 30 MIN: CPT | Performed by: INTERNAL MEDICINE

## 2025-04-16 PROCEDURE — 1158F ADVNC CARE PLAN TLK DOCD: CPT | Performed by: INTERNAL MEDICINE

## 2025-04-16 PROCEDURE — 1160F RVW MEDS BY RX/DR IN RCRD: CPT | Performed by: INTERNAL MEDICINE

## 2025-04-16 PROCEDURE — G2211 COMPLEX E/M VISIT ADD ON: HCPCS | Performed by: INTERNAL MEDICINE

## 2025-04-16 PROCEDURE — 1036F TOBACCO NON-USER: CPT | Performed by: INTERNAL MEDICINE

## 2025-04-16 PROCEDURE — 1126F AMNT PAIN NOTED NONE PRSNT: CPT | Performed by: INTERNAL MEDICINE

## 2025-04-16 ASSESSMENT — ENCOUNTER SYMPTOMS
DEPRESSION: 0
LOSS OF SENSATION IN FEET: 0
OCCASIONAL FEELINGS OF UNSTEADINESS: 0

## 2025-04-16 ASSESSMENT — PAIN SCALES - GENERAL: PAINLEVEL_OUTOF10: 0-NO PAIN

## 2025-04-16 NOTE — PROGRESS NOTES
Subjective   Kun Castle Jr is a 79 y.o. male who presents for patient is here for follow-up fasting blood work.  HPI  Kun is a 77-year-old male with known history of hypertension dyslipidemia vitamin D deficiency rheumatoid arthritis patient is here for follow-up, with major complaint denies chest pain shortness of breath fever chills nausea vomiting constipation diarrhea dysuria urgency frequency.  Patient is fasting for blood work. SP left arthroplasty 02/11/2025 Rt Knee arthroplasty 03/2024  Review of Systems  10 system review pertinent as above  Objective     Visit Vitals  /82   Pulse 74   Temp 36.5 °C (97.7 °F)   Resp 14        Physical Exam  HEENT: Atraumatic normocephalic the pupils are equal and round and reactive to light the sclerae nonicteric extraocular motion are intact.  Neck: Is supple without JVD no carotid bruits the trachea is midline there are no masses pulses are equal and bilateral with normal upstroke.  Skin: Normal.  Skin good texture.  Moist.  Good turgor.  No lesions, no rashes.  Lymph: No lymphadenopathy appreciated, no masses, no lesions  Lungs: Are clear to auscultation and percussion, good breath sounds bilaterally, no rhonchi, no wheezing, good diaphragmatic excursion.  Heart: Normal rate and normal rhythm S1, S2, no S3, no gallop, murmur or rub.  Abdomen: Soft, nontender, no organomegaly, good bowel sounds.    Extremities: Full range of motion, good pulses bilateral.  No cyanosis, no clubbing or edema.  Neuro: Cranial nerves II-XII are grossly intact there is no sensory or motor deficits.  Able to move all extremities.      Assessment/Plan     Fasting Blood test    CBC BMP LIPID AST ALT Vit D    Immunization  Flu 2024 fall  Pneuvax 2025 fall 2025  Shingrix up to date  RSV needed    Prevention  Aged out unless symptomatic        Left Knee replacement   Feb 2025  Dr Ornelas     Rt arthroplasty 03/2024  Dr ISSAC Ornelas  Doing great    Patient is doing well      Continue  with the low-fat, low-cholesterol diet,  I recommended Mediterranean diet, which include fish, chicken, vegetables and olive oil  Exercise daily for 30 minutes at least 3 times a week  Continue home medications    Hypertension  No added salt diet, do not and salt to your food  Try to exercise every other day for 30 minutes  Continue current medications    Rheumatoid arthritis  With deformed joint integrity  Occasional pain        Problem List Items Addressed This Visit       Rheumatoid arthritis    Hyperlipidemia - Primary    Relevant Orders    Lipid Panel    AST    ALT    Hypertension    Relevant Orders    Basic Metabolic Panel    Vitamin D deficiency    Relevant Orders    Vitamin D 25-Hydroxy,Total (for eval of Vitamin D levels)    Unilateral primary osteoarthritis, left knee    Relevant Orders    Basic Metabolic Panel    Gastroesophageal reflux disease    Relevant Orders    CBC w/5 Part Differential, [facility]             Hayden Bro MD

## 2025-04-17 ENCOUNTER — APPOINTMENT (OUTPATIENT)
Dept: PRIMARY CARE | Facility: CLINIC | Age: 80
End: 2025-04-17
Payer: MEDICARE

## 2025-05-08 ENCOUNTER — HOSPITAL ENCOUNTER (EMERGENCY)
Facility: HOSPITAL | Age: 80
Discharge: HOME | End: 2025-05-08
Attending: STUDENT IN AN ORGANIZED HEALTH CARE EDUCATION/TRAINING PROGRAM
Payer: MEDICARE

## 2025-05-08 ENCOUNTER — APPOINTMENT (OUTPATIENT)
Dept: RADIOLOGY | Facility: HOSPITAL | Age: 80
End: 2025-05-08
Payer: MEDICARE

## 2025-05-08 VITALS
DIASTOLIC BLOOD PRESSURE: 91 MMHG | SYSTOLIC BLOOD PRESSURE: 164 MMHG | WEIGHT: 184.08 LBS | BODY MASS INDEX: 26.41 KG/M2 | TEMPERATURE: 97.8 F | OXYGEN SATURATION: 99 % | HEART RATE: 79 BPM | RESPIRATION RATE: 18 BRPM

## 2025-05-08 DIAGNOSIS — S72.115A CLOSED NONDISPLACED FRACTURE OF GREATER TROCHANTER OF LEFT FEMUR, INITIAL ENCOUNTER: Primary | ICD-10-CM

## 2025-05-08 PROCEDURE — 73502 X-RAY EXAM HIP UNI 2-3 VIEWS: CPT | Mod: LT

## 2025-05-08 PROCEDURE — 2500000001 HC RX 250 WO HCPCS SELF ADMINISTERED DRUGS (ALT 637 FOR MEDICARE OP): Performed by: STUDENT IN AN ORGANIZED HEALTH CARE EDUCATION/TRAINING PROGRAM

## 2025-05-08 PROCEDURE — 73502 X-RAY EXAM HIP UNI 2-3 VIEWS: CPT | Mod: LEFT SIDE | Performed by: RADIOLOGY

## 2025-05-08 PROCEDURE — 99283 EMERGENCY DEPT VISIT LOW MDM: CPT | Performed by: STUDENT IN AN ORGANIZED HEALTH CARE EDUCATION/TRAINING PROGRAM

## 2025-05-08 RX ORDER — ACETAMINOPHEN 325 MG/1
650 TABLET ORAL ONCE
Status: COMPLETED | OUTPATIENT
Start: 2025-05-08 | End: 2025-05-08

## 2025-05-08 RX ORDER — ACETAMINOPHEN 325 MG/1
650 TABLET ORAL EVERY 6 HOURS PRN
Qty: 30 TABLET | Refills: 0 | Status: SHIPPED | OUTPATIENT
Start: 2025-05-08

## 2025-05-08 RX ORDER — LIDOCAINE 50 MG/G
1 PATCH TOPICAL DAILY
Qty: 6 PATCH | Refills: 0 | Status: SHIPPED | OUTPATIENT
Start: 2025-05-08

## 2025-05-08 RX ADMIN — ACETAMINOPHEN 650 MG: 325 TABLET ORAL at 18:17

## 2025-05-08 ASSESSMENT — PAIN SCALES - GENERAL: PAINLEVEL_OUTOF10: 3

## 2025-05-08 ASSESSMENT — PAIN - FUNCTIONAL ASSESSMENT: PAIN_FUNCTIONAL_ASSESSMENT: 0-10

## 2025-05-08 ASSESSMENT — PAIN DESCRIPTION - LOCATION: LOCATION: HIP

## 2025-05-08 NOTE — DISCHARGE INSTRUCTIONS
Follow-up with primary care physician and orthopedics outpatient for further management of your greater trochanter fracture.  Return to the emergency department at anytime with any new or worsening symptoms.

## 2025-05-08 NOTE — ED PROVIDER NOTES
HPI   Chief Complaint   Patient presents with    Fall     Fell with -LOC, no thinners. No deformities noted but pt complaining of L hip pain        Patient is 79-year-old male presented to the emergency department for evaluation of left hip pain.  Patient [place] [facility] [place] [facility] [place] when his left foot got stuck on the track and he landed on his left knee.  He states he grabbed the railing next to him.  He states he did not hit his head and did not lose consciousness.  He is not complaining of pain in the left hip.  He states he was able to do 5 additional laps around the track and his hip was sore but he was still able to ambulate.  He states he went home and sat down for some time and when he went to get out of the chair he had some difficulty.  He states he used his wife's walker however was having pain in the hip and therefore EMS was called.  He denies any chest pain, shortness of breath, fevers, chills, nausea, vomiting abdominal pain.  He states he is still able to ambulate however does have some pain on the lateral aspect of the left hip.  He denies any numbness or tingling down the left lower extremity.                Patient History   Medical History[1]  Surgical History[2]  Family History[3]  Social History[4]    Physical Exam   ED Triage Vitals [05/08/25 1807]   Temperature Heart Rate Respirations BP   36.6 °C (97.8 °F) 79 18 (!) 164/91      Pulse Ox Temp Source Heart Rate Source Patient Position   99 % Oral Monitor --      BP Location FiO2 (%)     -- --       Physical Exam  Vitals and nursing note reviewed.   Constitutional:       General: He is not in acute distress.     Appearance: Normal appearance. He is not ill-appearing or toxic-appearing.   HENT:      Head: Normocephalic and atraumatic.      Nose: Nose normal.      Mouth/Throat:      Mouth: Mucous membranes are moist.   Eyes:      Extraocular Movements: Extraocular movements intact.      Pupils: Pupils are equal,  round, and reactive to light.   Cardiovascular:      Rate and Rhythm: Normal rate.      Pulses: Normal pulses.   Pulmonary:      Effort: Pulmonary effort is normal.   Musculoskeletal:         General: Tenderness (Lateral aspect of the left hip with no palpable  deformities) present. No deformity. Normal range of motion.   Skin:     General: Skin is warm and dry.   Neurological:      General: No focal deficit present.      Mental Status: He is alert and oriented to person, place, and time.      Sensory: No sensory deficit (Normal sensation left lower extremity).      Motor: No weakness.   Psychiatric:         Mood and Affect: Mood normal.         Behavior: Behavior normal.           ED Course & MDM   Diagnoses as of 05/08/25 1905   Closed nondisplaced fracture of greater trochanter of left femur, initial encounter                 No data recorded     Ade Coma Scale Score: 15 (05/08/25 1809 : Daria Lance RN)                           Medical Decision Making  **Disclaimer parts of this chart have been completed using voice recognition software. Please excuse any errors of transcription.     Patient seen in conjunction with attending physician Dr. Elmore.    HPI: Detailed above.    Exam: A medically appropriate exam performed, outlined above, given the known history and presentation.    History obtained from: Patient    Labs/Diagnostics:  XR hip left with pelvis when performed 2 or 3 views   Final Result   Fracture through the greater trochanter of the left hip.             MACRO:   None        Signed by: Tonio Andrade 5/8/2025 6:55 PM   Dictation workstation:   [id number]        EMERGENCY DEPARTMENT COURSE and DIFFERENTIAL DIAGNOSIS/MDM:  Patient is a 79-year-old male presenting to the emergency department for evaluation of left hip pain after fall earlier today.  On physical exam vital signs remarkable for hypertension but otherwise stable and patient is in no acute distress.  Patient has normal sensation in  the bilateral lower extremities.  He has full range of motion of the left hip with some tenderness to palpation along the left lateral hip with no palpable or obvious deformities.  X-ray of the hip ordered to rule out fracture/dislocation.  Patient also given p.o. Tylenol for pain as he has not taken any medications today for the pain.  X-ray of the hip showed fracture through the greater trochanter of the left hip.  Patient was able to ambulate with a walker and therefore states he would like to go home.  He was discharged in stable condition.  He was offered pain medications however he states that he can stick with Tylenol.  He does not want anything stronger.  He was discharged in stable condition.  He will follow-up with orthopedics outpatient for further management.  He will return to the emergency department with any new or worsening symptoms.    The patient presented with a chief complaint of left hip pain. The differential diagnosis associated with this patient's presentation includes fracture, dislocation, musculoskeletal strain.     Vitals:    Vitals:    05/08/25 1807   BP: (!) 164/91   Pulse: 79   Resp: 18   Temp: 36.6 °C (97.8 °F)   TempSrc: Oral   SpO2: 99%   Weight: 83.5 kg (184 lb 1.4 oz)     History Limited by:    None    Independent history obtained from:    None    External records reviewed:    None    Diagnostics interpreted by me:    Xrays - see my independent interpretation in MDM    Discussions with other clinicians:    None    Chronic conditions impacting care:    None    Social determinants of health affecting care:    None    Diagnostic tests considered but not performed: None    ED Medications managed:    Medications   acetaminophen (Tylenol) tablet 650 mg (650 mg oral Given 5/8/25 1817)       Prescription drugs considered:    None    Screenings:              Procedure  Procedures         [1]   Past Medical History:  Diagnosis Date    Arthritis     Asymptomatic varicose veins of bilateral  lower extremities 07/27/2015    Varicose veins of legs    Cataract     Diverticulosis     GERD (gastroesophageal reflux disease)     Hiatal hernia     Hyperlipidemia     Hypertension     Joint pain     Nephrolithiasis     Nocturia 07/25/2016    Nocturia    Orthostatic hypotension 12/02/2015    Sympathotonic orthostatic hypotension    Pain in right knee 08/28/2015    Recurrent knee pain, right    Personal history of diseases of the skin and subcutaneous tissue 07/25/2015    History of rhinophyma    Personal history of other diseases of the digestive system     History of gastroesophageal reflux (GERD)    Personal history of other diseases of the nervous system and sense organs 10/29/2015    History of sciatica    Rheumatoid arthritis     Sciatica, unspecified side 12/02/2015    Acute sciatica    Vitamin D deficiency, unspecified 04/05/2016    Vitamin D deficiency   [2]   Past Surgical History:  Procedure Laterality Date    ADENOIDECTOMY      APPENDECTOMY      CATARACT EXTRACTION      CATARACT EXTRACTION, BILATERAL      CHOLECYSTECTOMY      COLONOSCOPY  05/14/2014    Colonoscopy (Fiberoptic)    HERNIA REPAIR      JOINT REPLACEMENT      KNEE ARTHROPLASTY      SINUS SURGERY      TENDON REPAIR Left     left hand    TONSILLECTOMY     [3]   Family History  Problem Relation Name Age of Onset    Diabetes Father      Cancer Brother     [4]   Social History  Tobacco Use    Smoking status: Never     Passive exposure: Never    Smokeless tobacco: Never   Vaping Use    Vaping status: Never Used   Substance Use Topics    Alcohol use: Yes     Comment: RARE    Drug use: Never        Minoo Joaquin PA-C  05/08/25 4851

## 2025-05-08 NOTE — ED PROVIDER NOTES
Supervisory note:  Patient seen in conjunction with SKIP Silva.  Patient presents with left hip injury.  He states that he was at the walking track when he tripped.  He landed on his left knee, injuring his left hip.  He states that he was able to walk several more laps following this.  He had difficulty standing up from his chair due to the pain in the left hip and this caused him to call EMS.  On examination, there is some tenderness to palpation of the lateral aspect of the left hip.  There is full internal and external rotation, however there is some pain with external rotation.  Patient is able to flex hip, lifting his leg off the bed, although this does cause pain in the hip.  2+ dorsalis pedis pulse, left foot.  Motor and sensation intact in left foot.    X-ray reveals greater trochanteric fracture.  Patient has already been weightbearing following this injury.  He is now able to bear weight using a walker.  He would prefer to go home.  Patient was advised to follow-up with orthopedics.  Return precautions given for any worsening symptoms.    I personally saw the patient and made/approved the management plan and take responsiblity for the patient management.  Parts of this chart were completed with dictation software, please excuse any errors in transcription.     Tonio Elmore MD  05/08/25 5079

## 2025-05-12 ENCOUNTER — APPOINTMENT (OUTPATIENT)
Dept: ORTHOPEDIC SURGERY | Facility: HOSPITAL | Age: 80
End: 2025-05-12
Payer: MEDICARE

## 2025-05-12 ENCOUNTER — HOSPITAL ENCOUNTER (OUTPATIENT)
Dept: RADIOLOGY | Facility: HOSPITAL | Age: 80
Discharge: HOME | End: 2025-05-12
Payer: MEDICARE

## 2025-05-12 DIAGNOSIS — Z96.652 STATUS POST LEFT KNEE REPLACEMENT: ICD-10-CM

## 2025-05-12 DIAGNOSIS — S72.112A DISPLACED FRACTURE OF GREATER TROCHANTER OF LEFT FEMUR, INITIAL ENCOUNTER FOR CLOSED FRACTURE: ICD-10-CM

## 2025-05-12 DIAGNOSIS — Z96.652 STATUS POST LEFT KNEE REPLACEMENT: Primary | ICD-10-CM

## 2025-05-12 PROCEDURE — 73562 X-RAY EXAM OF KNEE 3: CPT | Mod: LEFT SIDE | Performed by: RADIOLOGY

## 2025-05-12 PROCEDURE — 73562 X-RAY EXAM OF KNEE 3: CPT | Mod: LT

## 2025-05-12 NOTE — PROGRESS NOTES
Katie Ornelas MD   Adult Reconstruction and Joint Replacement Surgery  Phone: [phone]     Fax: [phone]       Name: Kun Castle Jr  Age: 79 y.o.   : 1945   Date of Visit: 2025    FOLLOW UP    Procedure: Left Total knee replacement  Date: 2025    Chief Complaint: Left Total knee replacement surgery follow-up    History of Present Illness:  The patient is now 12 weeks 6 days out from left Total knee replacement surgery.     He unfortunately slipped on wet track on Thursday and fell onto hip. Was found to have nondisplaced fracture of LEFT greater trochanter. Told to continue activities as tolerated.     The patient has mild or occasional pain.    Currently taking tylenol for pain.     The patient has intermediate stiffness.     Patient is walking with nothing for assistive device.    The patient goes up and down stairs with single leg negotiation .    Patient can walk 2-3 blocks.    The patient is doing outpatient physical therapy.    No fevers or drainage from the incision.    There are no concerns.    The patient is mildly limited.     Physical Exam:  The patient is well appearing, alert and oriented to person place and time.    The incision is well healed. There is no sign of wound complication.    Range of Motion: full extension to 130 degrees of flexion.    There is a 0 degree extensor lag.    There is no effusion in the knee.    There is no instability. The knee is stable to varus-valgus stress and anterior-posterior stress.     Homans sign is negative.    Neurologic, and vascular examinations are normal.    Left hip:    Examination of the hip reveals the skin to be intact.    There is moderate tenderness over the greater trochanter.    There is no obvious swelling.    There is a negative Stinchfield test.    Range of motion is: full extension to 100 degrees of flexion.    The hip internally rotates to 10 degrees and externally rotates to 40 degrees.    Abduction is 40  "degrees and adduction is 15 degrees.    There is groin and buttock pain with hip motion.    Abductor strength 4/5.    No Trendelenburg gait.      PROMs   02/27/24 12/12/24 16:53   \"KOOS, JR. Score\" 42.28 79.91       Imaging:    Regarding the left knee, x-rays were personally reviewed today and show implants in good position with no evidence of complication.     AP pelvis and left hip were also reviewed dated 5/8/2025.  There is a nondisplaced greater trochanteric femur fracture.    Impression and Plan:    This patient is 12 weeks 6 days from left Total knee replacement.    The patient is doing well following Total knee replacement surgery.  Antibiotic prophylaxis prior to dental procedures were reviewed.  Long term failure mechanisms were reviewed. Discussed importance of long term follow up.  Patient is to follow trochanteric precautions to the LEFT hip to optimize healing of nondisplaced LEFT greater trochanteric femur fracture.  The patient was asked to contact the office sooner if there are any concerns. Their questions were answered.     RTC: 1 year for L TKA surveillance, 1 month for left hip nondisplaced greater trochanteric fracture    X-rays at next visit: Postop TKA series    _____________________  Katie Ornelas MD   Attending Orthopaedic Surgeon  [facility]    [facility]    This office note was transcribed with dictation software.  Please excuse any typographical errors, program misunderstandings leading to inadvertent insertions or deletions of inappropriate wording, pronoun errors and other unintentional transcription errors not noticed on proof-reading.        "

## 2025-05-12 NOTE — LETTER
May 12, 2025     Hayden Bro MD  [address] [facility] [address]    Patient: Kun Castle Jr   YOB: 1945   Date of Visit: 2025       Dear Dr. Hayden Bro MD:    Thank you for referring Kun Castle Jr to me for evaluation. Below are my notes for this consultation.  If you have questions, please do not hesitate to call me. I look forward to following your patient along with you.       Sincerely,     Katie Ornelas MD      CC: No Recipients  ______________________________________________________________________________________     Katie Ornelas MD   Adult Reconstruction and Joint Replacement Surgery  Phone: [phone]     Fax: [phone]       Name: Kun Castle Jr  Age: 79 y.o.   : 1945   Date of Visit: 2025    FOLLOW UP    Procedure: Left Total knee replacement  Date: 2025    Chief Complaint: Left Total knee replacement surgery follow-up    History of Present Illness:  The patient is now 12 weeks 6 days out from left Total knee replacement surgery.     He unfortunately slipped on wet track on Thursday and fell onto hip. Was found to have nondisplaced fracture of LEFT greater trochanter. Told to continue activities as tolerated.     The patient has mild or occasional pain.    Currently taking tylenol for pain.     The patient has intermediate stiffness.     Patient is walking with nothing for assistive device.    The patient goes up and down stairs with single leg negotiation .    Patient can walk 2-3 blocks.    The patient is doing outpatient physical therapy.    No fevers or drainage from the incision.    There are no concerns.    The patient is mildly limited.     Physical Exam:  The patient is well appearing, alert and oriented to person place and time.    The incision is well healed. There is no sign of wound complication.    Range of Motion: full extension to 130 degrees of flexion.    There is a 0 degree extensor lag.    There is no  "effusion in the knee.    There is no instability. The knee is stable to varus-valgus stress and anterior-posterior stress.     Homans sign is negative.    Neurologic, and vascular examinations are normal.    Left hip:    Examination of the hip reveals the skin to be intact.    There is moderate tenderness over the greater trochanter.    There is no obvious swelling.    There is a negative Stinchfield test.    Range of motion is: full extension to 100 degrees of flexion.    The hip internally rotates to 10 degrees and externally rotates to 40 degrees.    Abduction is 40 degrees and adduction is 15 degrees.    There is groin and buttock pain with hip motion.    Abductor strength 4/5.    No Trendelenburg gait.      PROMs   02/27/24 12/12/24 16:53   \"KOOSJR. Score\" 42.28 79.91       Imaging:    Regarding the left knee, x-rays were personally reviewed today and show implants in good position with no evidence of complication.     AP pelvis and left hip were also reviewed dated 5/8/2025.  There is a nondisplaced greater trochanteric femur fracture.    Impression and Plan:    This patient is 12 weeks 6 days from left Total knee replacement.    The patient is doing well following Total knee replacement surgery.  Antibiotic prophylaxis prior to dental procedures were reviewed.  Long term failure mechanisms were reviewed. Discussed importance of long term follow up.  Patient is to follow trochanteric precautions to the LEFT hip to optimize healing of nondisplaced LEFT greater trochanteric femur fracture.  The patient was asked to contact the office sooner if there are any concerns. Their questions were answered.     RTC: 1 year for L TKA surveillance, 1 month for left hip nondisplaced greater trochanteric fracture    X-rays at next visit: Postop TKA series    _____________________  Katie Ornelas MD   Attending Orthopaedic Surgeon  [facility]    [facility]    This " office note was transcribed with dictation software.  Please excuse any typographical errors, program misunderstandings leading to inadvertent insertions or deletions of inappropriate wording, pronoun errors and other unintentional transcription errors not noticed on proof-reading.

## 2025-06-11 ENCOUNTER — HOSPITAL ENCOUNTER (OUTPATIENT)
Dept: RADIOLOGY | Facility: HOSPITAL | Age: 80
Discharge: HOME | End: 2025-06-11
Payer: MEDICARE

## 2025-06-11 ENCOUNTER — OFFICE VISIT (OUTPATIENT)
Dept: ORTHOPEDIC SURGERY | Facility: HOSPITAL | Age: 80
End: 2025-06-11
Payer: MEDICARE

## 2025-06-11 DIAGNOSIS — M16.12 PRIMARY OSTEOARTHRITIS OF LEFT HIP: ICD-10-CM

## 2025-06-11 DIAGNOSIS — M16.12 PRIMARY OSTEOARTHRITIS OF LEFT HIP: Primary | ICD-10-CM

## 2025-06-11 PROCEDURE — 73502 X-RAY EXAM HIP UNI 2-3 VIEWS: CPT | Mod: LT

## 2025-06-11 PROCEDURE — 1125F AMNT PAIN NOTED PAIN PRSNT: CPT | Performed by: STUDENT IN AN ORGANIZED HEALTH CARE EDUCATION/TRAINING PROGRAM

## 2025-06-11 PROCEDURE — 99212 OFFICE O/P EST SF 10 MIN: CPT | Performed by: STUDENT IN AN ORGANIZED HEALTH CARE EDUCATION/TRAINING PROGRAM

## 2025-06-11 PROCEDURE — 1159F MED LIST DOCD IN RCRD: CPT | Performed by: STUDENT IN AN ORGANIZED HEALTH CARE EDUCATION/TRAINING PROGRAM

## 2025-06-11 PROCEDURE — 73502 X-RAY EXAM HIP UNI 2-3 VIEWS: CPT | Mod: LEFT SIDE | Performed by: RADIOLOGY

## 2025-06-11 PROCEDURE — 99213 OFFICE O/P EST LOW 20 MIN: CPT | Performed by: STUDENT IN AN ORGANIZED HEALTH CARE EDUCATION/TRAINING PROGRAM

## 2025-06-11 ASSESSMENT — PAIN DESCRIPTION - DESCRIPTORS: DESCRIPTORS: ACHING

## 2025-06-11 ASSESSMENT — PAIN - FUNCTIONAL ASSESSMENT: PAIN_FUNCTIONAL_ASSESSMENT: 0-10

## 2025-06-11 ASSESSMENT — PAIN SCALES - GENERAL: PAINLEVEL_OUTOF10: 2

## 2025-06-11 NOTE — PROGRESS NOTES
Name: Kun Castle Jr  Age: 79 y.o.   : 1945   Date of Visit:      Left Hip and Knee FOLLOW UP     Procedure: Left Total knee replacement  Date: 2025     Chief Complaint: Left hip greater trochanteric femur fracture follow-up      History of Present Illness:  The patient is now 4 months  out from left Total knee replacement surgery.      He unfortunately slipped on a wet track approximately 1 month ago sustaining a nondisplaced greater trochanteric femur fracture.  In the interim, his pain has been improving and responsive to Tylenol.  He continues to walk with an antalgic gait, however this is improving.  He endorses some pain with long car rides and rising from sitting to standing but feels this resolves after several minutes of activity.  Denies any additional falls.     The patient has mild or occasional pain.     Currently taking tylenol for pain.      The patient has intermediate stiffness.      Patient is walking with nothing for assistive device.     The patient goes up and down stairs with single leg negotiation .     Patient can walk 2-3 blocks.     The patient is doing outpatient physical therapy.     No fevers or drainage from the incision.     There are no concerns.     The patient is mildly limited.      Physical Exam:  The patient is well appearing, alert and oriented to person place and time.     The incision is well healed. There is no sign of wound complication.     Range of Motion: full extension to 130 degrees of flexion.     There is a 0 degree extensor lag.     There is no effusion in the knee.     There is no instability. The knee is stable to varus-valgus stress and anterior-posterior stress.      Homans sign is negative.     Neurologic, and vascular examinations are normal.     Left hip:     Examination of the hip reveals the skin to be intact.     There is mild tenderness over the greater trochanter.     There is no obvious swelling.     There is a negative  "Stinchfield test.     Range of motion is: full extension to 100 degrees of flexion.     The hip internally rotates to 10 degrees and externally rotates to 40 degrees.     Abduction is 40 degrees and adduction is 15 degrees.     There is groin and buttock pain with hip motion.     Abductor strength 4/5.     Mildly antalgic gait        PROMs    02/27/24 12/12/24 16:53   \"KOOS, JR. Score\" 42.28 79.91         Imaging:     Regarding the left knee, x-rays were personally reviewed today and show implants in good position with no evidence of complication.      AP pelvis and left hip were also reviewed dated 5/8/2025.  There is a nondisplaced greater trochanteric femur fracture.  Repeat x-rays ordered for 6/11/2025, currently pending.     Impression and Plan:     This patient is 12 weeks 6 days from left Total knee replacement.     The patient is doing well following Total knee replacement surgery.  Antibiotic prophylaxis prior to dental procedures were reviewed.  Long term failure mechanisms were reviewed. Discussed importance of long term follow up.      Regarding the left hip, the patient can be weightbearing as tolerated.  Follow-up x-rays were ordered today and demonstrate *** (pending).  We will follow-up in 3 months with repeat x-rays and clinical exam of the left hip.  The patient was asked to contact the office sooner if there are any concerns. Their questions were answered.      RTC: 1 year for L TKA surveillance, 2 months for left hip nondisplaced greater trochanteric fracture     X-rays at next visit: XR left hip with pelvis (2 months), Postop TKA series (1 year)      _____________________  Katie Ornelas MD   Attending Orthopaedic Surgeon  [facility]    [facility]     This office note was transcribed with dictation software.  Please excuse any typographical errors, program misunderstandings leading to inadvertent insertions or deletions of inappropriate " wording, pronoun errors and other unintentional transcription errors not noticed on proof-reading.

## 2025-08-11 ENCOUNTER — HOSPITAL ENCOUNTER (OUTPATIENT)
Dept: RADIOLOGY | Facility: HOSPITAL | Age: 80
Discharge: HOME | End: 2025-08-11
Payer: MEDICARE

## 2025-08-11 ENCOUNTER — OFFICE VISIT (OUTPATIENT)
Dept: ORTHOPEDIC SURGERY | Facility: HOSPITAL | Age: 80
End: 2025-08-11
Payer: MEDICARE

## 2025-08-11 DIAGNOSIS — Z96.652 STATUS POST LEFT KNEE REPLACEMENT: ICD-10-CM

## 2025-08-11 DIAGNOSIS — Z96.652 STATUS POST LEFT KNEE REPLACEMENT: Primary | ICD-10-CM

## 2025-08-11 DIAGNOSIS — M16.12 PRIMARY OSTEOARTHRITIS OF LEFT HIP: ICD-10-CM

## 2025-08-11 PROCEDURE — 99212 OFFICE O/P EST SF 10 MIN: CPT | Performed by: STUDENT IN AN ORGANIZED HEALTH CARE EDUCATION/TRAINING PROGRAM

## 2025-08-11 PROCEDURE — 73502 X-RAY EXAM HIP UNI 2-3 VIEWS: CPT | Mod: LT

## 2025-08-11 PROCEDURE — 1159F MED LIST DOCD IN RCRD: CPT | Performed by: STUDENT IN AN ORGANIZED HEALTH CARE EDUCATION/TRAINING PROGRAM

## 2025-08-11 PROCEDURE — G2211 COMPLEX E/M VISIT ADD ON: HCPCS | Performed by: STUDENT IN AN ORGANIZED HEALTH CARE EDUCATION/TRAINING PROGRAM

## 2025-08-11 PROCEDURE — 99215 OFFICE O/P EST HI 40 MIN: CPT | Performed by: STUDENT IN AN ORGANIZED HEALTH CARE EDUCATION/TRAINING PROGRAM

## 2025-08-11 PROCEDURE — 73502 X-RAY EXAM HIP UNI 2-3 VIEWS: CPT | Mod: LEFT SIDE

## 2025-08-11 ASSESSMENT — PAIN - FUNCTIONAL ASSESSMENT: PAIN_FUNCTIONAL_ASSESSMENT: NO/DENIES PAIN

## 2026-01-15 ENCOUNTER — APPOINTMENT (OUTPATIENT)
Dept: PRIMARY CARE | Facility: CLINIC | Age: 81
End: 2026-01-15
Payer: MEDICARE

## (undated) DEVICE — SUTURE, STRATAFIX, 3-0, SPIRAL MONOCRYL PLUS, UNDYED 20CM  SH

## (undated) DEVICE — IRRIGATION SYSTEM, WOUND, PULSAVAC PLUS

## (undated) DEVICE — MARKER, SURGICAL, SKIN, REG TIP, W/ RULER & LABELS

## (undated) DEVICE — Device

## (undated) DEVICE — CEMENT, MIXEVAC III, 10S BOWL, KNEES

## (undated) DEVICE — SUTURE, VICRYL, 0, 18 IN, CT-1, UNDYED

## (undated) DEVICE — BLADE, SAGITTAL NARROW THICK 2108-152

## (undated) DEVICE — BLADE, SAW, SAGITTAL, DUAL CUT, 18 X 90 X 1.27

## (undated) DEVICE — DRAPE, INCISE, ANTIMICROBIAL, IOBAN 2, LARGE, 17 X 23 IN, DISPOSABLE, STERILE

## (undated) DEVICE — GLOVE, SURGICAL, PROTEXIS NEOPRENE, 7.0, PF, LF

## (undated) DEVICE — PREP, SKIN, BETADINE, SOLUTION, 16 OZ

## (undated) DEVICE — WOUND SYSTEM, DEBRIDEMENT & CLEANING, O.R DUOPAK

## (undated) DEVICE — SOLUTION, INJECTION, SODIUM CHLORIDE 9%, 3000ML

## (undated) DEVICE — SUTURE, VICRYL, 3-0, 27 IN, SH

## (undated) DEVICE — BLADE DUAL CUT SAGITTAL SAW STAINLESS STEEL 35 INCH X 64 INCH X .89 MM LATEX FREE REUSABLE

## (undated) DEVICE — SUTURE, STRATAFIX, SPIRAL MONOCRYL PLUS, 3-0, PS-1 45CM, UNDYED

## (undated) DEVICE — APPLICATOR, CHLORAPREP, W/ORANGE TINT, 26ML

## (undated) DEVICE — DRAPE, SHEET, THREE QUARTER, FAN FOLD, 57 X 77 IN

## (undated) DEVICE — ADHESIVE, SKIN, DERMABOND ADVANCED, 15CM, PEN-STYLE

## (undated) DEVICE — BLADE, SAGITTAL DUAL CUT, 25 X 90 X 1.27

## (undated) DEVICE — GLOVE, SURGICAL, PROTEXIS PI , 7.0, PF, LF

## (undated) DEVICE — PREP, SKIN, CHLORHEXIDINE GLUCONATE, 2%, 1 QT

## (undated) DEVICE — SUTURE, ETHIBOND, P2, V-37, 30 IN, GREEN

## (undated) DEVICE — SYRINGE ONLY, SLIP TIP 50CC

## (undated) DEVICE — SYRINGE, 6 CC, LUER LOCK, MONOJECT, LF

## (undated) DEVICE — SUTURE, STRATAFIX PDS PLUS, 1, OS-6, SYMMETRIC 45CM, VIOLET